# Patient Record
Sex: MALE | Race: WHITE | Employment: FULL TIME | URBAN - METROPOLITAN AREA
[De-identification: names, ages, dates, MRNs, and addresses within clinical notes are randomized per-mention and may not be internally consistent; named-entity substitution may affect disease eponyms.]

---

## 2017-01-05 ENCOUNTER — ANTI-COAG VISIT (OUTPATIENT)
Dept: HEMATOLOGY/ONCOLOGY | Age: 57
End: 2017-01-05
Attending: INTERNAL MEDICINE
Payer: COMMERCIAL

## 2017-01-05 LAB — INR: 2.9 (ref 0.8–1.3)

## 2017-01-05 PROCEDURE — 36416 COLLJ CAPILLARY BLOOD SPEC: CPT

## 2017-01-05 PROCEDURE — 85610 PROTHROMBIN TIME: CPT

## 2017-01-11 ENCOUNTER — TELEPHONE (OUTPATIENT)
Dept: SURGERY | Facility: CLINIC | Age: 57
End: 2017-01-11

## 2017-01-11 ENCOUNTER — OFFICE VISIT (OUTPATIENT)
Dept: SURGERY | Facility: CLINIC | Age: 57
End: 2017-01-11

## 2017-01-11 VITALS
HEIGHT: 70 IN | SYSTOLIC BLOOD PRESSURE: 144 MMHG | HEART RATE: 81 BPM | WEIGHT: 295 LBS | RESPIRATION RATE: 15 BRPM | BODY MASS INDEX: 42.23 KG/M2 | DIASTOLIC BLOOD PRESSURE: 82 MMHG

## 2017-01-11 DIAGNOSIS — M46.1 BILATERAL SACROILIITIS (HCC): Primary | ICD-10-CM

## 2017-01-11 PROCEDURE — 99214 OFFICE O/P EST MOD 30 MIN: CPT | Performed by: ANESTHESIOLOGY

## 2017-01-11 NOTE — TELEPHONE ENCOUNTER
Patient evaluated 1/11, recommended for Right and Left Radiofrequency Ablation of Sacral Lateral Branches. Patient is Work Comp.  Please start prior authorization request.

## 2017-01-11 NOTE — PATIENT INSTRUCTIONS
Refill policies:    • Allow 2 business days for refills; controlled substances may take longer.   • Contact your pharmacy at least 5 days prior to running out of medication and have them send an electronic request or submit request through the “request re your physician has recommended that you have a procedure or additional testing performed. [facility]) will contact your insurance carrier to obtain pre-certification or prior authorization.     Unfortunately, JACK has seen an increas

## 2017-01-11 NOTE — TELEPHONE ENCOUNTER
Called Work comp and left a detailed message with Larry Marcial.     Will need clinical notes completed to fax the notes to the adjustor at fax#[phone]

## 2017-01-15 NOTE — PROGRESS NOTES
Name: Elias Prieto   : 10/16/1960   DOS: 2017    Pain Clinic Follow Up Visit:   Elias Prieto is a 60year old male who presents for recheck of his chronic low back pain.   He is status post radiofrequency ablation of bilateral lumbar medial bran 42.33 kg/m2  Severe tenderness over the bilateral sacroiliac joint. Carmen Gasmen test is positive bilaterally. Flexion of the spine makes the pain better, extension and lateral rotation of the spine makes the pain worse.  Straight leg raising test is negative bi

## 2017-01-16 NOTE — TELEPHONE ENCOUNTER
Clinical notes completed, to be faxed to Camron Neely. Also spoke with patient who states  will be sending letter of payment guarantee incase workman's comp denies. Patient/ did this back in August which was OK'd by .

## 2017-01-19 ENCOUNTER — ANTI-COAG VISIT (OUTPATIENT)
Dept: HEMATOLOGY/ONCOLOGY | Age: 57
End: 2017-01-19
Attending: INTERNAL MEDICINE
Payer: COMMERCIAL

## 2017-01-19 DIAGNOSIS — I26.99 OTHER PULMONARY EMBOLISM WITHOUT ACUTE COR PULMONALE (HCC): Primary | ICD-10-CM

## 2017-01-19 LAB — INR: 3.1 (ref 0.8–1.3)

## 2017-01-19 PROCEDURE — 36416 COLLJ CAPILLARY BLOOD SPEC: CPT

## 2017-01-20 ENCOUNTER — TELEPHONE (OUTPATIENT)
Dept: SURGERY | Facility: CLINIC | Age: 57
End: 2017-01-20

## 2017-01-20 DIAGNOSIS — M46.1 BILATERAL SACROILIITIS (HCC): Primary | ICD-10-CM

## 2017-01-20 DIAGNOSIS — M47.817 LUMBOSACRAL SPONDYLOSIS WITHOUT MYELOPATHY: ICD-10-CM

## 2017-01-20 NOTE — TELEPHONE ENCOUNTER
Letter received from patient's  office, letter indicates  office will be responsible for payment of RFA if Workers Comp does not pay. Letter sent to scanning. Spoke with patient and scheduled RFA x 2 . Reviewed pre-op instructions.  [place] ? 81mg day of procedure  ? Greater than 81 mg (325mg) 7 days  ? Coumadin Procedure may be cancelled if INR is elevated. ? Epidural ____ - 7 days  ? Others 5 days  ? Excedrin (with aspirin) 7 days  ? Plavix (Clopidogrel)  ? Epidural ____ - 10 days  ?  [place]

## 2017-01-23 ENCOUNTER — TELEPHONE (OUTPATIENT)
Dept: NEUROLOGY | Facility: CLINIC | Age: 57
End: 2017-01-23

## 2017-01-23 NOTE — TELEPHONE ENCOUNTER
Spoke to Marielle at [place], codes [id number] are valid and billable, no prior authorization or predetermination required.  Call reference: [id number]  Call time 53:06

## 2017-01-24 ENCOUNTER — DOCUMENTATION ONLY (OUTPATIENT)
Dept: HEMATOLOGY/ONCOLOGY | Facility: HOSPITAL | Age: 57
End: 2017-01-24

## 2017-01-24 NOTE — PROGRESS NOTES
[facility] Progress Note    Patient Name: Savannah Bhatia   YOB: 1960   Medical Record Number: [id number]   CSN: [id number]   Attending Physician: Edi Booth M.D.     1/24/2017     Patient is to undergo radiofrequency ablation by Dr. Jose Alberto Caballero

## 2017-01-24 NOTE — TELEPHONE ENCOUNTER
Brianne from Dr Gale Mcmullen office phone # [phone] called to schedule a Peer to Peer 1/25 at 12:00 for upcoming injections at the request of Philip YANEZ/[employer]

## 2017-01-24 NOTE — TELEPHONE ENCOUNTER
Pt informed of message below regarding medical clearance. Pt verbalized understanding. No further questions at this time.

## 2017-01-25 NOTE — TELEPHONE ENCOUNTER
Per LE Del Angel and Dr. Troy Brasher, peer to peer completed with Dr. Shawn Moore, RFAs of right and left sacral branches approved, but wait for the letter. No authorization number given.     Patient already scheduled 2/14/17 and 2/28/17 due to receipt of guaranteed of pa

## 2017-01-26 ENCOUNTER — TELEPHONE (OUTPATIENT)
Dept: HEMATOLOGY/ONCOLOGY | Facility: HOSPITAL | Age: 57
End: 2017-01-26

## 2017-01-26 NOTE — TELEPHONE ENCOUNTER
Received Peer Review Report from [facility] [employer],endorsed to Pain nurses in [address] [place] [address]

## 2017-01-27 NOTE — TELEPHONE ENCOUNTER
Correspondence received from [facility] stating the Radiofrequency ablation of right sacral lateral branch followed by left sacral lateral branch are medically certified by physician advisor. Letter sent to scan.

## 2017-02-14 ENCOUNTER — SURGERY (OUTPATIENT)
Age: 57
End: 2017-02-14

## 2017-02-14 ENCOUNTER — APPOINTMENT (OUTPATIENT)
Dept: GENERAL RADIOLOGY | Facility: HOSPITAL | Age: 57
End: 2017-02-14
Attending: ANESTHESIOLOGY
Payer: OTHER MISCELLANEOUS

## 2017-02-14 ENCOUNTER — TELEPHONE (OUTPATIENT)
Dept: SURGERY | Facility: CLINIC | Age: 57
End: 2017-02-14

## 2017-02-14 ENCOUNTER — HOSPITAL ENCOUNTER (OUTPATIENT)
Facility: HOSPITAL | Age: 57
Setting detail: HOSPITAL OUTPATIENT SURGERY
Discharge: HOME | End: 2017-02-14
Attending: ANESTHESIOLOGY | Admitting: ANESTHESIOLOGY
Payer: OTHER MISCELLANEOUS

## 2017-02-14 VITALS
OXYGEN SATURATION: 98 % | HEART RATE: 61 BPM | RESPIRATION RATE: 18 BRPM | TEMPERATURE: 98 F | DIASTOLIC BLOOD PRESSURE: 80 MMHG | SYSTOLIC BLOOD PRESSURE: 127 MMHG

## 2017-02-14 DIAGNOSIS — M47.817 LUMBOSACRAL SPONDYLOSIS WITHOUT MYELOPATHY: ICD-10-CM

## 2017-02-14 DIAGNOSIS — M46.1 BILATERAL SACROILIITIS (HCC): ICD-10-CM

## 2017-02-14 DIAGNOSIS — M46.1 SACROILIITIS (HCC): Primary | ICD-10-CM

## 2017-02-14 PROCEDURE — 99152 MOD SED SAME PHYS/QHP 5/>YRS: CPT | Performed by: ANESTHESIOLOGY

## 2017-02-14 PROCEDURE — 3E0T3TZ INTRODUCTION OF DESTRUCTIVE AGENT INTO PERIPHERAL NERVES AND PLEXI, PERCUTANEOUS APPROACH: ICD-10-PCS | Performed by: ANESTHESIOLOGY

## 2017-02-14 RX ORDER — METHYLPREDNISOLONE ACETATE 40 MG/ML
INJECTION, SUSPENSION INTRA-ARTICULAR; INTRALESIONAL; INTRAMUSCULAR; SOFT TISSUE AS NEEDED
Status: DISCONTINUED | OUTPATIENT
Start: 2017-02-14 | End: 2017-02-14 | Stop reason: HOSPADM

## 2017-02-14 RX ORDER — SODIUM CHLORIDE, SODIUM LACTATE, POTASSIUM CHLORIDE, CALCIUM CHLORIDE 600; 310; 30; 20 MG/100ML; MG/100ML; MG/100ML; MG/100ML
100 INJECTION, SOLUTION INTRAVENOUS CONTINUOUS
Status: DISCONTINUED | OUTPATIENT
Start: 2017-02-14 | End: 2017-02-14

## 2017-02-14 RX ORDER — LIDOCAINE HYDROCHLORIDE 10 MG/ML
INJECTION, SOLUTION EPIDURAL; INFILTRATION; INTRACAUDAL; PERINEURAL AS NEEDED
Status: DISCONTINUED | OUTPATIENT
Start: 2017-02-14 | End: 2017-02-14 | Stop reason: HOSPADM

## 2017-02-14 NOTE — H&P
History & Physical Examination    Patient Name: Paulo Mckeon  MRN: [id number]  CSN: [id number]  YOB: 1960    Pre-Operative Diagnosis:  Lumbosacral spondylosis without myelopathy [M47.817]  Bilateral sacroiliitis ([address] [M46.1]    Present Illn right worse than left. HEART [x ] [x ]    LUNGS [x ] [x ]    ABDOMEN [x ] [x ]    UROGENITAL [x ] [x ]    EXTREMITIES [x ] [x ]    OTHER        [ x ] I have discussed the risks and benefits and alternatives with the patient/family.   They understand and

## 2017-02-14 NOTE — OPERATIVE REPORT
[facility]  Operative Report  2017     Jostin Kelley Patient Status:  Hospital Outpatient Surgery    10/16/1960 MRN [id number]   Location [address] [place] [address] Attending No att. providers found   Hosp Day # 0 PCP May Lipscomb the inferior pole of the right sacroiliac joint. The second needle was placed into the joint but approximately 1 cm cephalad to the first needle.  The subsequent needles were place in this \"leap frog\" fashion until the superior pole of the joint is reache

## 2017-02-15 NOTE — TELEPHONE ENCOUNTER
Please change the procedure on 2/28 to RFA left sacral lateral branches. Case request entered with sedation.   Thanks

## 2017-02-16 ENCOUNTER — APPOINTMENT (OUTPATIENT)
Dept: HEMATOLOGY/ONCOLOGY | Age: 57
End: 2017-02-16
Attending: INTERNAL MEDICINE
Payer: COMMERCIAL

## 2017-02-26 ENCOUNTER — TELEPHONE (OUTPATIENT)
Dept: NEUROLOGY | Facility: CLINIC | Age: 57
End: 2017-02-26

## 2017-02-26 NOTE — TELEPHONE ENCOUNTER
Pt informed pain procedure on 2/28 canceled at this time. Instructed to call insurance carrier to determine other pain management physicians approved in pts plan. Pt stated this is workman's comp.   I reiterated that he should seek treatment elsewhere so h

## 2017-02-27 DIAGNOSIS — K21.00 REFLUX ESOPHAGITIS: Primary | ICD-10-CM

## 2017-02-27 RX ORDER — PANTOPRAZOLE SODIUM 40 MG/1
40 TABLET, DELAYED RELEASE ORAL DAILY
Qty: 90 TABLET | Refills: 0 | Status: SHIPPED | OUTPATIENT
Start: 2017-02-27 | End: 2017-05-30

## 2017-03-06 ENCOUNTER — TELEPHONE (OUTPATIENT)
Dept: SURGERY | Facility: CLINIC | Age: 57
End: 2017-03-06

## 2017-03-06 ENCOUNTER — ANTI-COAG VISIT (OUTPATIENT)
Dept: HEMATOLOGY/ONCOLOGY | Age: 57
End: 2017-03-06
Attending: INTERNAL MEDICINE
Payer: COMMERCIAL

## 2017-03-06 DIAGNOSIS — M46.1 SACROILIITIS, NOT ELSEWHERE CLASSIFIED (HCC): Primary | ICD-10-CM

## 2017-03-06 DIAGNOSIS — I26.99 OTHER PULMONARY EMBOLISM WITHOUT ACUTE COR PULMONALE (HCC): Primary | ICD-10-CM

## 2017-03-06 DIAGNOSIS — M47.819 SPONDYLOSIS WITHOUT MYELOPATHY: ICD-10-CM

## 2017-03-06 LAB — INR: 1.8 (ref 0.8–1.3)

## 2017-03-06 PROCEDURE — 85610 PROTHROMBIN TIME: CPT

## 2017-03-06 PROCEDURE — 36416 COLLJ CAPILLARY BLOOD SPEC: CPT

## 2017-03-06 NOTE — TELEPHONE ENCOUNTER
Spoke to patient, 2/28 cancelled procedure has been rescheduled. Pre-procedure instructions reviewed, patient verbalized understanding, no further needs at this time.       [address]  PRE-PROCEDURE INSTRUCTIONS WITH IV SEDATION    Appointm ? Others 7 days   NSAIDs: 24 hours    ?  Ibuprofen (Motrin, Advil, Vicoprofen), Naproxen (Naprosyn, Aleve), Piroxcam (Feldene), Meloxicam (Mobic), Oxaprozin (Daypro), Diclofenac (Voltaren),  Indomethacin (Indocin), Etodolac (Lodine), Nabumetone (Relafen)

## 2017-03-13 ENCOUNTER — ANTI-COAG VISIT (OUTPATIENT)
Dept: HEMATOLOGY/ONCOLOGY | Age: 57
End: 2017-03-13
Attending: INTERNAL MEDICINE
Payer: COMMERCIAL

## 2017-03-13 DIAGNOSIS — I26.99 OTHER PULMONARY EMBOLISM WITHOUT ACUTE COR PULMONALE (HCC): Primary | ICD-10-CM

## 2017-03-13 LAB — INR: 3.2 (ref 0.8–1.3)

## 2017-03-13 PROCEDURE — 85610 PROTHROMBIN TIME: CPT

## 2017-03-13 PROCEDURE — 36416 COLLJ CAPILLARY BLOOD SPEC: CPT

## 2017-03-27 ENCOUNTER — APPOINTMENT (OUTPATIENT)
Dept: GENERAL RADIOLOGY | Facility: HOSPITAL | Age: 57
End: 2017-03-27
Attending: ANESTHESIOLOGY
Payer: OTHER MISCELLANEOUS

## 2017-03-27 ENCOUNTER — SURGERY (OUTPATIENT)
Age: 57
End: 2017-03-27

## 2017-03-27 ENCOUNTER — HOSPITAL ENCOUNTER (OUTPATIENT)
Facility: HOSPITAL | Age: 57
Setting detail: HOSPITAL OUTPATIENT SURGERY
Discharge: HOME | End: 2017-03-27
Attending: ANESTHESIOLOGY | Admitting: ANESTHESIOLOGY
Payer: OTHER MISCELLANEOUS

## 2017-03-27 VITALS
SYSTOLIC BLOOD PRESSURE: 142 MMHG | TEMPERATURE: 98 F | OXYGEN SATURATION: 100 % | DIASTOLIC BLOOD PRESSURE: 93 MMHG | RESPIRATION RATE: 22 BRPM | HEART RATE: 59 BPM

## 2017-03-27 DIAGNOSIS — M46.1 SACROILIITIS, NOT ELSEWHERE CLASSIFIED (HCC): ICD-10-CM

## 2017-03-27 DIAGNOSIS — M47.819 SPONDYLOSIS WITHOUT MYELOPATHY: ICD-10-CM

## 2017-03-27 PROCEDURE — 99152 MOD SED SAME PHYS/QHP 5/>YRS: CPT | Performed by: ANESTHESIOLOGY

## 2017-03-27 PROCEDURE — 3E0T3TZ INTRODUCTION OF DESTRUCTIVE AGENT INTO PERIPHERAL NERVES AND PLEXI, PERCUTANEOUS APPROACH: ICD-10-PCS | Performed by: ANESTHESIOLOGY

## 2017-03-27 RX ORDER — SODIUM CHLORIDE, SODIUM LACTATE, POTASSIUM CHLORIDE, CALCIUM CHLORIDE 600; 310; 30; 20 MG/100ML; MG/100ML; MG/100ML; MG/100ML
100 INJECTION, SOLUTION INTRAVENOUS CONTINUOUS
Status: DISCONTINUED | OUTPATIENT
Start: 2017-03-27 | End: 2017-03-27

## 2017-03-27 RX ORDER — METHYLPREDNISOLONE ACETATE 40 MG/ML
INJECTION, SUSPENSION INTRA-ARTICULAR; INTRALESIONAL; INTRAMUSCULAR; SOFT TISSUE AS NEEDED
Status: DISCONTINUED | OUTPATIENT
Start: 2017-03-27 | End: 2017-03-27 | Stop reason: HOSPADM

## 2017-03-27 RX ORDER — LIDOCAINE HYDROCHLORIDE 10 MG/ML
INJECTION, SOLUTION EPIDURAL; INFILTRATION; INTRACAUDAL; PERINEURAL AS NEEDED
Status: DISCONTINUED | OUTPATIENT
Start: 2017-03-27 | End: 2017-03-27 | Stop reason: HOSPADM

## 2017-03-27 NOTE — OPERATIVE REPORT
[facility]  Operative Report  3/27/2017     Meseret Abarca Patient Status:  Hospital Outpatient Surgery    10/16/1960 MRN [id number]   [facility] Attending Bernardino Mg MD   Hosp Day # 0 PCP Gene Santos MD     Indication: the left sacroiliac joint. The second needle was placed into the joint but approximately 1 cm cephalad to the first needle. The subsequent needles were place in this \"leap frog\" fashion until the superior pole of the joint is reached.  Under fluoroscopic

## 2017-03-27 NOTE — H&P
History & Physical Examination    Patient Name: Baron Miner  MRN: [id number]  CSN: [id number]  YOB: 1960    Pre-Operative Diagnosis:  Sacroiliitis, not elsewhere classified [facility] [M46.1]  Spondylosis without myelopathy [M19.90]    Present HEART [x ] [x ]    LUNGS [x ] [x ]    ABDOMEN [x ] [x ]    UROGENITAL [x ] [x ]    EXTREMITIES [x ] [x ]    OTHER        [ x ] I have discussed the risks and benefits and alternatives with the patient/family.   They understand and agree to proceed with pl

## 2017-04-05 ENCOUNTER — ANTI-COAG VISIT (OUTPATIENT)
Dept: HEMATOLOGY/ONCOLOGY | Age: 57
End: 2017-04-05
Attending: INTERNAL MEDICINE
Payer: COMMERCIAL

## 2017-04-05 DIAGNOSIS — I26.99 OTHER PULMONARY EMBOLISM WITHOUT ACUTE COR PULMONALE (HCC): Primary | ICD-10-CM

## 2017-04-05 PROCEDURE — 85610 PROTHROMBIN TIME: CPT

## 2017-04-05 PROCEDURE — 36416 COLLJ CAPILLARY BLOOD SPEC: CPT

## 2017-04-06 ENCOUNTER — SOCIAL WORK SERVICES (OUTPATIENT)
Dept: HEMATOLOGY/ONCOLOGY | Facility: HOSPITAL | Age: 57
End: 2017-04-06

## 2017-04-06 NOTE — PROGRESS NOTES
SW met with patient and explained process for referral for home monitoring for coumadin. [facility] faxed referral to [facility] at [phone].

## 2017-04-07 ENCOUNTER — TELEPHONE (OUTPATIENT)
Dept: HEMATOLOGY/ONCOLOGY | Facility: HOSPITAL | Age: 57
End: 2017-04-07

## 2017-04-07 DIAGNOSIS — I26.99 OTHER PULMONARY EMBOLISM WITHOUT ACUTE COR PULMONALE (HCC): Primary | ICD-10-CM

## 2017-04-07 RX ORDER — WARFARIN SODIUM 5 MG/1
TABLET ORAL
Qty: 60 TABLET | Refills: 3 | Status: SHIPPED
Start: 2017-04-07 | End: 2017-12-18

## 2017-04-11 ENCOUNTER — ANTI-COAG VISIT (OUTPATIENT)
Dept: HEMATOLOGY/ONCOLOGY | Age: 57
End: 2017-04-11
Attending: INTERNAL MEDICINE
Payer: COMMERCIAL

## 2017-04-11 DIAGNOSIS — I26.99 OTHER PULMONARY EMBOLISM WITHOUT ACUTE COR PULMONALE (HCC): Primary | ICD-10-CM

## 2017-04-11 PROCEDURE — 36416 COLLJ CAPILLARY BLOOD SPEC: CPT

## 2017-04-11 PROCEDURE — 85610 PROTHROMBIN TIME: CPT

## 2017-04-17 DIAGNOSIS — E78.5 HYPERLIPIDEMIA, UNSPECIFIED HYPERLIPIDEMIA TYPE: ICD-10-CM

## 2017-04-17 RX ORDER — OMEGA-3-ACID ETHYL ESTERS 1 G/1
2 CAPSULE, LIQUID FILLED ORAL 2 TIMES DAILY
Qty: 360 CAPSULE | Refills: 0 | Status: SHIPPED | OUTPATIENT
Start: 2017-04-17 | End: 2017-07-21

## 2017-04-17 NOTE — TELEPHONE ENCOUNTER
From: Emily Solis  To:  Stephen Daly MD  Sent: 4/17/2017 9:08 AM CDT  Subject: Medication Renewal Request    Original authorizing provider: MD Emily Walsh would like a refill of the following medications:  Omega-3-acid Ethyl Esters (L

## 2017-04-18 ENCOUNTER — ANTI-COAG VISIT (OUTPATIENT)
Dept: HEMATOLOGY/ONCOLOGY | Age: 57
End: 2017-04-18
Attending: INTERNAL MEDICINE
Payer: COMMERCIAL

## 2017-04-18 ENCOUNTER — OFFICE VISIT (OUTPATIENT)
Dept: SURGERY | Facility: CLINIC | Age: 57
End: 2017-04-18

## 2017-04-18 VITALS
WEIGHT: 300 LBS | RESPIRATION RATE: 18 BRPM | HEIGHT: 70 IN | DIASTOLIC BLOOD PRESSURE: 76 MMHG | HEART RATE: 65 BPM | SYSTOLIC BLOOD PRESSURE: 134 MMHG | OXYGEN SATURATION: 98 % | BODY MASS INDEX: 42.95 KG/M2

## 2017-04-18 DIAGNOSIS — I26.99 OTHER PULMONARY EMBOLISM WITHOUT ACUTE COR PULMONALE (HCC): Primary | ICD-10-CM

## 2017-04-18 DIAGNOSIS — M46.1 SACROILIITIS (HCC): Primary | ICD-10-CM

## 2017-04-18 DIAGNOSIS — M51.36 DDD (DEGENERATIVE DISC DISEASE), LUMBAR: ICD-10-CM

## 2017-04-18 DIAGNOSIS — M47.816 LUMBAR FACET ARTHROPATHY: ICD-10-CM

## 2017-04-18 PROCEDURE — 99214 OFFICE O/P EST MOD 30 MIN: CPT | Performed by: PHYSICIAN ASSISTANT

## 2017-04-18 NOTE — PATIENT INSTRUCTIONS
For Dr. Hull Guest nurse line, call  [phone] with any questions or concerns Monday through Friday 8:00 to 4:30. After hours or weekends for emergent needs [phone]  Take 5mg coumadin on Tuesday, and 2.5mg (split the 5mg tablet) on Friday.   Return

## 2017-04-18 NOTE — PROGRESS NOTES
Name: Estle Crigler   : 10/16/1960   DOS: 2017     Pain Clinic Follow Up Visit:   Patient presents with: Other: f/u,      Estle Crigler is a 60year old male who presents for recheck of chronic low back pain.  Pt is S/P a 2017 [facility] a away.  Currently he does not feel the other areas of pain in the back need to be addressed at this time. Pt denies any chills, fever, or weakness. There is no bladder or bowel incontinence associated with the pain.     REVIEW OF SYSTEMS:  A ten point rev f/u with us on an as needed basis  Pt advised to work on weight loss and to stick with a program. I offered him to see Dr Lena Guan with weight loss clinic but pt declined and said him and his wife never stick to a program and fall off the wagon.   Pt to mateo

## 2017-04-18 NOTE — PATIENT INSTRUCTIONS
Refill policies:    • Allow 2 business days for refills; controlled substances may take longer.   • Contact your pharmacy at least 5 days prior to running out of medication and have them send an electronic request or submit request through the “request re insurance carrier to obtain pre-certification or prior authorization. Unfortunately, JACK has seen an increase in denial of payment even though the procedure/test has been pre-certified.   You are strongly encouraged to contact your insurance carrier to v

## 2017-04-19 ENCOUNTER — TELEPHONE (OUTPATIENT)
Dept: HEMATOLOGY/ONCOLOGY | Facility: HOSPITAL | Age: 57
End: 2017-04-19

## 2017-04-25 ENCOUNTER — ANTI-COAG VISIT (OUTPATIENT)
Dept: HEMATOLOGY/ONCOLOGY | Age: 57
End: 2017-04-25
Attending: INTERNAL MEDICINE
Payer: COMMERCIAL

## 2017-04-25 DIAGNOSIS — I26.99 OTHER PULMONARY EMBOLISM WITHOUT ACUTE COR PULMONALE (HCC): Primary | ICD-10-CM

## 2017-04-25 PROCEDURE — 85610 PROTHROMBIN TIME: CPT

## 2017-04-25 PROCEDURE — 36416 COLLJ CAPILLARY BLOOD SPEC: CPT

## 2017-05-02 ENCOUNTER — ANTI-COAG VISIT (OUTPATIENT)
Dept: HEMATOLOGY/ONCOLOGY | Age: 57
End: 2017-05-02
Attending: INTERNAL MEDICINE
Payer: COMMERCIAL

## 2017-05-02 DIAGNOSIS — I26.99 OTHER PULMONARY EMBOLISM WITHOUT ACUTE COR PULMONALE (HCC): Primary | ICD-10-CM

## 2017-05-02 PROCEDURE — 36416 COLLJ CAPILLARY BLOOD SPEC: CPT

## 2017-05-02 PROCEDURE — 85610 PROTHROMBIN TIME: CPT

## 2017-05-16 ENCOUNTER — ANTI-COAG VISIT (OUTPATIENT)
Dept: HEMATOLOGY/ONCOLOGY | Age: 57
End: 2017-05-16
Attending: INTERNAL MEDICINE
Payer: COMMERCIAL

## 2017-05-16 DIAGNOSIS — I26.99 OTHER PULMONARY EMBOLISM WITHOUT ACUTE COR PULMONALE (HCC): Primary | ICD-10-CM

## 2017-05-16 PROCEDURE — 36416 COLLJ CAPILLARY BLOOD SPEC: CPT

## 2017-05-16 PROCEDURE — 85610 PROTHROMBIN TIME: CPT

## 2017-05-19 ENCOUNTER — ANTI-COAG VISIT (OUTPATIENT)
Dept: HEMATOLOGY/ONCOLOGY | Facility: HOSPITAL | Age: 57
End: 2017-05-19

## 2017-05-19 DIAGNOSIS — I26.99 OTHER PULMONARY EMBOLISM WITHOUT ACUTE COR PULMONALE (HCC): Primary | ICD-10-CM

## 2017-05-26 ENCOUNTER — TELEPHONE (OUTPATIENT)
Dept: HEMATOLOGY/ONCOLOGY | Facility: HOSPITAL | Age: 57
End: 2017-05-26

## 2017-05-26 NOTE — TELEPHONE ENCOUNTER
Pt is set up with home monitoring through [facility]. He is due for check next week. He was told he can report directly to MD and not to [facility] for dosing- is this correct and how does he make sure this is OK? ?   Also, pt was not aware he would have to test every

## 2017-05-29 DIAGNOSIS — K21.00 REFLUX ESOPHAGITIS: ICD-10-CM

## 2017-05-30 RX ORDER — PANTOPRAZOLE SODIUM 40 MG/1
40 TABLET, DELAYED RELEASE ORAL DAILY
Qty: 90 TABLET | Refills: 0 | Status: SHIPPED | OUTPATIENT
Start: 2017-05-30 | End: 2017-07-21

## 2017-05-30 NOTE — TELEPHONE ENCOUNTER
From: Zo Clements  To:  Daria Vallecillo MD  Sent: 5/29/2017 9:19 PM CDT  Subject: Medication Renewal Request    Original authorizing provider: MD Zo Cunha would like a refill of the following medications:  Pantoprazole Sodium 40 MG Or

## 2017-06-02 ENCOUNTER — ANTI-COAG VISIT (OUTPATIENT)
Dept: HEMATOLOGY/ONCOLOGY | Facility: HOSPITAL | Age: 57
End: 2017-06-02

## 2017-06-02 DIAGNOSIS — I26.99 OTHER PULMONARY EMBOLISM WITHOUT ACUTE COR PULMONALE (HCC): Primary | ICD-10-CM

## 2017-06-06 ENCOUNTER — SOCIAL WORK SERVICES (OUTPATIENT)
Dept: HEMATOLOGY/ONCOLOGY | Facility: HOSPITAL | Age: 57
End: 2017-06-06

## 2017-06-06 NOTE — PROGRESS NOTES
Patient wants to call INR results in to MD and test lest frequently than every two weeks. SW spoke with Praful Pappas at [facility]. Insurance requires patient to call results in to [facility] and patient must test every two weeks.   SW left vm for patient regarding abov

## 2017-06-16 ENCOUNTER — ANTI-COAG VISIT (OUTPATIENT)
Dept: HEMATOLOGY/ONCOLOGY | Facility: HOSPITAL | Age: 57
End: 2017-06-16

## 2017-06-16 DIAGNOSIS — I26.99 OTHER PULMONARY EMBOLISM WITHOUT ACUTE COR PULMONALE (HCC): Primary | ICD-10-CM

## 2017-06-23 ENCOUNTER — ANTI-COAG VISIT (OUTPATIENT)
Dept: HEMATOLOGY/ONCOLOGY | Facility: HOSPITAL | Age: 57
End: 2017-06-23

## 2017-06-23 DIAGNOSIS — I26.99 OTHER PULMONARY EMBOLISM WITHOUT ACUTE COR PULMONALE (HCC): Primary | ICD-10-CM

## 2017-06-30 ENCOUNTER — ANTI-COAG VISIT (OUTPATIENT)
Dept: HEMATOLOGY/ONCOLOGY | Facility: HOSPITAL | Age: 57
End: 2017-06-30

## 2017-06-30 DIAGNOSIS — I26.99 OTHER PULMONARY EMBOLISM WITHOUT ACUTE COR PULMONALE (HCC): ICD-10-CM

## 2017-07-07 ENCOUNTER — ANTI-COAG VISIT (OUTPATIENT)
Dept: HEMATOLOGY/ONCOLOGY | Facility: HOSPITAL | Age: 57
End: 2017-07-07

## 2017-07-07 DIAGNOSIS — I26.99 OTHER PULMONARY EMBOLISM WITHOUT ACUTE COR PULMONALE (HCC): ICD-10-CM

## 2017-07-07 LAB — INR: 3 (ref 0.8–1.2)

## 2017-07-21 ENCOUNTER — OFFICE VISIT (OUTPATIENT)
Dept: FAMILY MEDICINE CLINIC | Facility: CLINIC | Age: 57
End: 2017-07-21

## 2017-07-21 ENCOUNTER — ANTI-COAG VISIT (OUTPATIENT)
Dept: HEMATOLOGY/ONCOLOGY | Facility: HOSPITAL | Age: 57
End: 2017-07-21

## 2017-07-21 VITALS
DIASTOLIC BLOOD PRESSURE: 82 MMHG | OXYGEN SATURATION: 98 % | BODY MASS INDEX: 43.09 KG/M2 | WEIGHT: 301 LBS | HEIGHT: 70 IN | RESPIRATION RATE: 20 BRPM | HEART RATE: 68 BPM | TEMPERATURE: 98 F | SYSTOLIC BLOOD PRESSURE: 128 MMHG

## 2017-07-21 DIAGNOSIS — E78.5 HYPERLIPIDEMIA, UNSPECIFIED HYPERLIPIDEMIA TYPE: ICD-10-CM

## 2017-07-21 DIAGNOSIS — K21.00 REFLUX ESOPHAGITIS: ICD-10-CM

## 2017-07-21 DIAGNOSIS — I82.443 DEEP VEIN THROMBOSIS (DVT) OF TIBIAL VEIN OF BOTH LOWER EXTREMITIES, UNSPECIFIED CHRONICITY (HCC): Primary | ICD-10-CM

## 2017-07-21 DIAGNOSIS — I26.99 OTHER PULMONARY EMBOLISM WITHOUT ACUTE COR PULMONALE (HCC): ICD-10-CM

## 2017-07-21 DIAGNOSIS — E55.9 VITAMIN D DEFICIENCY: ICD-10-CM

## 2017-07-21 DIAGNOSIS — M54.16 LUMBAR RADICULITIS: ICD-10-CM

## 2017-07-21 LAB — INR: 2.5 (ref 0.8–1.2)

## 2017-07-21 PROCEDURE — 99214 OFFICE O/P EST MOD 30 MIN: CPT | Performed by: FAMILY MEDICINE

## 2017-07-21 RX ORDER — CHOLECALCIFEROL (VITAMIN D3) 125 MCG
CAPSULE ORAL
COMMUNITY

## 2017-07-21 RX ORDER — TRAMADOL HYDROCHLORIDE 50 MG/1
50 TABLET ORAL EVERY 8 HOURS PRN
Qty: 90 TABLET | Refills: 1 | Status: SHIPPED
Start: 2017-07-21 | End: 2017-07-24

## 2017-07-21 RX ORDER — OMEGA-3-ACID ETHYL ESTERS 1 G/1
2 CAPSULE, LIQUID FILLED ORAL 2 TIMES DAILY
Qty: 360 CAPSULE | Refills: 1 | Status: SHIPPED | OUTPATIENT
Start: 2017-07-21 | End: 2017-07-24

## 2017-07-21 RX ORDER — PANTOPRAZOLE SODIUM 40 MG/1
40 TABLET, DELAYED RELEASE ORAL DAILY
Qty: 90 TABLET | Refills: 1 | Status: SHIPPED | OUTPATIENT
Start: 2017-07-21 | End: 2017-07-24

## 2017-07-21 NOTE — PROGRESS NOTES
Chief Complaint:   Patient presents with:  Medication Follow-Up    HPI:   This is a 60year old male presenting for physical.     Patient has history of TG-currently on lovaza with no side effects, last TG's stable.      Patient has a history of DVT and pul abdominal pain  Current Meds:    Current Outpatient Prescriptions:  Acetaminophen (ARTHRITIS PAIN OR) Take by mouth. Disp:  Rfl:    Cholecalciferol (VITAMIN D3) 2000 units Oral Tab Take by mouth.  Disp:  Rfl:    Pantoprazole Sodium 40 MG Oral Tab EC Take 1 Psychiatric/Behavioral: Negative for suicidal ideas, behavioral problems, sleep disturbance and agitation. The patient is not nervous/anxious.         EXAM:   /82 (BP Location: Right arm, Patient Position: Sitting, Cuff Size: adult)   Pulse 68   Tem normal mood and affect. His behavior is normal. Judgment and thought content normal.        ASSESSMENT AND PLAN:     1. Hyperlipidemia, unspecified hyperlipidemia type  -stable, CPM  - Omega-3-acid Ethyl Esters (LOVAZA) 1 g Oral Cap;  Take 2 capsules (2 g t

## 2017-07-24 ENCOUNTER — TELEPHONE (OUTPATIENT)
Dept: FAMILY MEDICINE CLINIC | Facility: CLINIC | Age: 57
End: 2017-07-24

## 2017-07-24 DIAGNOSIS — K21.00 REFLUX ESOPHAGITIS: ICD-10-CM

## 2017-07-24 DIAGNOSIS — M54.16 LUMBAR RADICULITIS: ICD-10-CM

## 2017-07-24 DIAGNOSIS — E78.5 HYPERLIPIDEMIA, UNSPECIFIED HYPERLIPIDEMIA TYPE: ICD-10-CM

## 2017-07-24 RX ORDER — TRAMADOL HYDROCHLORIDE 50 MG/1
50 TABLET ORAL EVERY 8 HOURS PRN
Qty: 90 TABLET | Refills: 1 | Status: SHIPPED
Start: 2017-07-24 | End: 2017-07-27

## 2017-07-24 RX ORDER — CLOTRIMAZOLE AND BETAMETHASONE DIPROPIONATE 10; .64 MG/G; MG/G
1 CREAM TOPICAL 2 TIMES DAILY
Qty: 45 G | Refills: 1 | Status: SHIPPED | OUTPATIENT
Start: 2017-07-24 | End: 2017-08-07

## 2017-07-24 RX ORDER — PANTOPRAZOLE SODIUM 40 MG/1
40 TABLET, DELAYED RELEASE ORAL DAILY
Qty: 90 TABLET | Refills: 1 | Status: SHIPPED | OUTPATIENT
Start: 2017-07-24 | End: 2018-02-26

## 2017-07-24 RX ORDER — OMEGA-3-ACID ETHYL ESTERS 1 G/1
2 CAPSULE, LIQUID FILLED ORAL 2 TIMES DAILY
Qty: 360 CAPSULE | Refills: 1 | Status: SHIPPED | OUTPATIENT
Start: 2017-07-24 | End: 2018-01-26

## 2017-07-24 NOTE — TELEPHONE ENCOUNTER
[address] requesting refill on tramadol 50 MG states pt is new to pharmacy and needs new script sent to them please fax [phone]

## 2017-07-24 NOTE — TELEPHONE ENCOUNTER
Patient was seen by the doctor on 7/21/17. He had 3 scripts filled and sent to his mail order pharmacy.  Pt states during the visit he also spoke to Dr. Stephanie Cardona, regarding a skin irritation he had been having and Dr. Stephanie Cardona told him he would send a topical

## 2017-07-24 NOTE — TELEPHONE ENCOUNTER
Script for Tramadol was filled on 7.21.17 through [facility]. [facility] order. [facility] mail order called today for the script. Need to verify which pharmacy the pt would like to use. LM for pt to call the office back.

## 2017-07-24 NOTE — TELEPHONE ENCOUNTER
Please approve pending rx for three above scripts. These scripts needs to be resent/ faxed to [facility]. Thank you.

## 2017-07-24 NOTE — TELEPHONE ENCOUNTER
Called pt, he stated his insurance shows he is to use [place] [facility] however he received a letter in the mail stating [employer] will send the scripts to [facility]. Pt stated the script does need to go to [facility] mail service.

## 2017-07-27 DIAGNOSIS — M54.16 LUMBAR RADICULITIS: ICD-10-CM

## 2017-07-27 RX ORDER — TRAMADOL HYDROCHLORIDE 50 MG/1
50 TABLET ORAL EVERY 8 HOURS PRN
Qty: 270 TABLET | Refills: 1 | Status: SHIPPED
Start: 2017-07-27 | End: 2017-07-28

## 2017-07-27 NOTE — TELEPHONE ENCOUNTER
Mail order pharmacy called us regarding Tramadol Rx sent on 07/24, they can only fill Rx for 60 or 90 days, anything 30 days must be filled at local pharmacy.  If OK to increase, please fax to mail order, but if only authorized for as currently written, ple

## 2017-07-27 NOTE — TELEPHONE ENCOUNTER
Please  read message below. Ok to fill 90 day supply? (pended Rx is for 270 tablets). Please advise.  Thank you

## 2017-07-28 RX ORDER — TRAMADOL HYDROCHLORIDE 50 MG/1
50 TABLET ORAL EVERY 8 HOURS PRN
Qty: 60 TABLET | Refills: 1 | Status: SHIPPED
Start: 2017-07-28 | End: 2018-08-24

## 2017-07-28 NOTE — TELEPHONE ENCOUNTER
Rx initially approved by Dr. Mike Sierra, however he is out of the office today and unable to sign Rx. Additionally, per message below, 270 tabs is too much and script should not go to mail order.      Please sign off on above 30 day Rx if appropriate for patie

## 2017-08-04 ENCOUNTER — ANTI-COAG VISIT (OUTPATIENT)
Dept: HEMATOLOGY/ONCOLOGY | Facility: HOSPITAL | Age: 57
End: 2017-08-04

## 2017-08-04 DIAGNOSIS — I26.99 OTHER PULMONARY EMBOLISM WITHOUT ACUTE COR PULMONALE (HCC): ICD-10-CM

## 2017-08-04 LAB — INR: 2 (ref 0.8–1.2)

## 2017-08-16 LAB
ABSOLUTE BASOPHILS: 67 CELLS/UL (ref 0–200)
ABSOLUTE EOSINOPHILS: 221 CELLS/UL (ref 15–500)
ABSOLUTE LYMPHOCYTES: 1968 CELLS/UL (ref 850–3900)
ABSOLUTE MONOCYTES: 427 CELLS/UL (ref 200–950)
ABSOLUTE NEUTROPHILS: 2117 CELLS/UL (ref 1500–7800)
ALBUMIN/GLOBULIN RATIO: 1.5 (CALC) (ref 1–2.5)
ALBUMIN: 4.3 G/DL (ref 3.6–5.1)
ALKALINE PHOSPHATASE: 43 U/L (ref 40–115)
ALT: 39 U/L (ref 9–46)
AST: 37 U/L (ref 10–35)
BASOPHILS: 1.4 %
BILIRUBIN, TOTAL: 0.6 MG/DL (ref 0.2–1.2)
BUN: 17 MG/DL (ref 7–25)
CALCIUM: 9.8 MG/DL (ref 8.6–10.3)
CARBON DIOXIDE: 26 MMOL/L (ref 20–31)
CHLORIDE: 104 MMOL/L (ref 98–110)
CHOL/HDLC RATIO: 6 (CALC)
CHOLESTEROL, TOTAL: 288 MG/DL (ref 125–200)
CREATININE: 1.22 MG/DL (ref 0.7–1.33)
EGFR IF AFRICN AM: 75 ML/MIN/1.73M2
EGFR IF NONAFRICN AM: 65 ML/MIN/1.73M2
EOSINOPHILS: 4.6 %
GLOBULIN: 2.9 G/DL (CALC) (ref 1.9–3.7)
GLUCOSE: 101 MG/DL (ref 65–99)
HDL CHOLESTEROL: 48 MG/DL
HEMATOCRIT: 44.5 % (ref 38.5–50)
HEMOGLOBIN: 15.7 G/DL (ref 13.2–17.1)
LDL-CHOLESTEROL: 171 MG/DL (CALC)
LYMPHOCYTES: 41 %
MCH: 32.8 PG (ref 27–33)
MCHC: 35.3 G/DL (ref 32–36)
MCV: 92.9 FL (ref 80–100)
MONOCYTES: 8.9 %
MPV: 11.3 FL (ref 7.5–12.5)
NEUTROPHILS: 44.1 %
NON-HDL CHOLESTEROL: 240 MG/DL (CALC)
PLATELET COUNT: 210 THOUSAND/UL (ref 140–400)
POTASSIUM: 4.8 MMOL/L (ref 3.5–5.3)
PROTEIN, TOTAL: 7.2 G/DL (ref 6.1–8.1)
RDW: 13.3 % (ref 11–15)
RED BLOOD CELL COUNT: 4.79 MILLION/UL (ref 4.2–5.8)
SODIUM: 141 MMOL/L (ref 135–146)
TRIGLYCERIDES: 347 MG/DL
VITAMIN D, 25-OH, TOTAL: 26 NG/ML (ref 30–100)
WHITE BLOOD CELL COUNT: 4.8 THOUSAND/UL (ref 3.8–10.8)

## 2017-08-18 ENCOUNTER — ANTI-COAG VISIT (OUTPATIENT)
Dept: HEMATOLOGY/ONCOLOGY | Facility: HOSPITAL | Age: 57
End: 2017-08-18

## 2017-08-18 DIAGNOSIS — I26.99 OTHER PULMONARY EMBOLISM WITHOUT ACUTE COR PULMONALE (HCC): ICD-10-CM

## 2017-08-18 LAB — INR: 1.7 (ref 0.8–1.2)

## 2017-08-22 ENCOUNTER — TELEPHONE (OUTPATIENT)
Dept: FAMILY MEDICINE CLINIC | Facility: CLINIC | Age: 57
End: 2017-08-22

## 2017-08-22 ENCOUNTER — PATIENT MESSAGE (OUTPATIENT)
Dept: FAMILY MEDICINE CLINIC | Facility: CLINIC | Age: 57
End: 2017-08-22

## 2017-08-22 DIAGNOSIS — E55.9 VITAMIN D DEFICIENCY: ICD-10-CM

## 2017-08-22 DIAGNOSIS — E78.00 HYPERCHOLESTEREMIA: Primary | ICD-10-CM

## 2017-08-22 RX ORDER — GEMFIBROZIL 600 MG/1
600 TABLET, FILM COATED ORAL
Qty: 60 TABLET | Refills: 2 | Status: SHIPPED | OUTPATIENT
Start: 2017-08-22 | End: 2018-06-12 | Stop reason: ALTCHOICE

## 2017-08-22 NOTE — TELEPHONE ENCOUNTER
----- Message from Anastasia Cheadle, MD sent at 8/20/2017  6:58 PM CDT -----  Chol's worse, please add lopid 600 mg bid, recheck cmp and lipid in 3 months. Vitamin D's low, need 5000 unit q daily vitamin D, recheck vitamin D 6 months.

## 2017-08-22 NOTE — TELEPHONE ENCOUNTER
I called pt at [phone] and verified 2 patient identifiers: name & . I discussed results and recommendations at length with patient. All questions answered.      When I went to place the order, there is an interaction between lopid & warfarin-pl

## 2017-08-23 ENCOUNTER — TELEPHONE (OUTPATIENT)
Dept: FAMILY MEDICINE CLINIC | Facility: CLINIC | Age: 57
End: 2017-08-23

## 2017-08-23 NOTE — TELEPHONE ENCOUNTER
Pt does not want to start Lopid at this time, he is concerned with it interacting with his Coumadin & he doesn't want to take any more medications. He is working on improving his diet. Please advise on this.

## 2017-08-23 NOTE — TELEPHONE ENCOUNTER
From: Jamie Gottron  To: Modesta Mclaughlin MD  Sent: 8/22/2017 7:22 PM CDT  Subject: Prescription Question    Please call me on 8-23 about new pills I am not picking them up yet. My blood work is changing every test do not want anything messing with it.   Thank

## 2017-08-24 NOTE — TELEPHONE ENCOUNTER
My chart message sent to patient advising that Dr. Grupo Ovalle is aware he does not want to start Lopid. Encouraged patient to continue with plan to repeat labs in November.

## 2017-09-05 ENCOUNTER — ANTI-COAG VISIT (OUTPATIENT)
Dept: HEMATOLOGY/ONCOLOGY | Facility: HOSPITAL | Age: 57
End: 2017-09-05

## 2017-09-05 DIAGNOSIS — I26.99 OTHER PULMONARY EMBOLISM WITHOUT ACUTE COR PULMONALE (HCC): ICD-10-CM

## 2017-09-05 LAB — INR: 2.8 (ref 0.8–1.2)

## 2017-10-02 ENCOUNTER — ANTI-COAG VISIT (OUTPATIENT)
Dept: HEMATOLOGY/ONCOLOGY | Facility: HOSPITAL | Age: 57
End: 2017-10-02

## 2017-10-02 DIAGNOSIS — I26.99 OTHER PULMONARY EMBOLISM WITHOUT ACUTE COR PULMONALE (HCC): ICD-10-CM

## 2017-10-16 ENCOUNTER — ANTI-COAG VISIT (OUTPATIENT)
Dept: HEMATOLOGY/ONCOLOGY | Facility: HOSPITAL | Age: 57
End: 2017-10-16

## 2017-10-16 DIAGNOSIS — I26.99 OTHER PULMONARY EMBOLISM WITHOUT ACUTE COR PULMONALE (HCC): ICD-10-CM

## 2017-10-30 ENCOUNTER — ANTI-COAG VISIT (OUTPATIENT)
Dept: HEMATOLOGY/ONCOLOGY | Facility: HOSPITAL | Age: 57
End: 2017-10-30

## 2017-10-30 DIAGNOSIS — I26.99 OTHER PULMONARY EMBOLISM WITHOUT ACUTE COR PULMONALE (HCC): ICD-10-CM

## 2017-11-06 ENCOUNTER — ANTI-COAG VISIT (OUTPATIENT)
Dept: HEMATOLOGY/ONCOLOGY | Facility: HOSPITAL | Age: 57
End: 2017-11-06

## 2017-11-06 DIAGNOSIS — I26.99 OTHER PULMONARY EMBOLISM WITHOUT ACUTE COR PULMONALE (HCC): ICD-10-CM

## 2017-11-13 ENCOUNTER — ANTI-COAG VISIT (OUTPATIENT)
Dept: HEMATOLOGY/ONCOLOGY | Facility: HOSPITAL | Age: 57
End: 2017-11-13

## 2017-11-13 DIAGNOSIS — I26.99 OTHER PULMONARY EMBOLISM WITHOUT ACUTE COR PULMONALE (HCC): ICD-10-CM

## 2017-12-04 ENCOUNTER — ANTI-COAG VISIT (OUTPATIENT)
Dept: HEMATOLOGY/ONCOLOGY | Facility: HOSPITAL | Age: 57
End: 2017-12-04

## 2017-12-04 DIAGNOSIS — I26.99 OTHER PULMONARY EMBOLISM WITHOUT ACUTE COR PULMONALE (HCC): ICD-10-CM

## 2017-12-18 DIAGNOSIS — I26.99 OTHER PULMONARY EMBOLISM WITHOUT ACUTE COR PULMONALE (HCC): ICD-10-CM

## 2017-12-18 RX ORDER — WARFARIN SODIUM 5 MG/1
TABLET ORAL
Qty: 135 TABLET | Refills: 3 | Status: SHIPPED | OUTPATIENT
Start: 2017-12-18 | End: 2019-01-12

## 2018-01-02 ENCOUNTER — MED REC SCAN ONLY (OUTPATIENT)
Dept: HEMATOLOGY/ONCOLOGY | Facility: HOSPITAL | Age: 58
End: 2018-01-02

## 2018-01-02 ENCOUNTER — ANTI-COAG VISIT (OUTPATIENT)
Dept: HEMATOLOGY/ONCOLOGY | Facility: HOSPITAL | Age: 58
End: 2018-01-02

## 2018-01-02 DIAGNOSIS — I26.99 OTHER PULMONARY EMBOLISM WITHOUT ACUTE COR PULMONALE (HCC): ICD-10-CM

## 2018-01-02 LAB — INR: 2.7 (ref 0.8–1.2)

## 2018-01-10 ENCOUNTER — TELEPHONE (OUTPATIENT)
Dept: FAMILY MEDICINE CLINIC | Facility: CLINIC | Age: 58
End: 2018-01-10

## 2018-01-10 DIAGNOSIS — E78.5 HYPERLIPIDEMIA, UNSPECIFIED HYPERLIPIDEMIA TYPE: ICD-10-CM

## 2018-01-10 DIAGNOSIS — E55.9 VITAMIN D DEFICIENCY: ICD-10-CM

## 2018-01-10 DIAGNOSIS — R73.01 IMPAIRED FASTING GLUCOSE: Primary | ICD-10-CM

## 2018-01-10 NOTE — TELEPHONE ENCOUNTER
Pt wants labs entered in Aug to be changed to [address] [place] [address].  Pt was here in the office. Given lab orders.

## 2018-01-11 LAB
ALBUMIN/GLOBULIN RATIO: 1.4 (CALC) (ref 1–2.5)
ALBUMIN: 4.4 G/DL (ref 3.6–5.1)
ALKALINE PHOSPHATASE: 51 U/L (ref 40–115)
ALT: 51 U/L (ref 9–46)
AST: 57 U/L (ref 10–35)
BILIRUBIN, TOTAL: 0.5 MG/DL (ref 0.2–1.2)
BUN: 19 MG/DL (ref 7–25)
CALCIUM: 9.8 MG/DL (ref 8.6–10.3)
CARBON DIOXIDE: 28 MMOL/L (ref 20–31)
CHLORIDE: 104 MMOL/L (ref 98–110)
CHOL/HDLC RATIO: 4.7 (CALC)
CHOLESTEROL, TOTAL: 256 MG/DL
CREATININE: 1.21 MG/DL (ref 0.7–1.33)
EGFR IF AFRICN AM: 75 ML/MIN/1.73M2
EGFR IF NONAFRICN AM: 65 ML/MIN/1.73M2
GLOBULIN: 3.1 G/DL (CALC) (ref 1.9–3.7)
GLUCOSE: 101 MG/DL (ref 65–99)
HDL CHOLESTEROL: 55 MG/DL
LDL-CHOLESTEROL: 164 MG/DL (CALC)
NON-HDL CHOLESTEROL: 201 MG/DL (CALC)
POTASSIUM: 5 MMOL/L (ref 3.5–5.3)
PROTEIN, TOTAL: 7.5 G/DL (ref 6.1–8.1)
SODIUM: 141 MMOL/L (ref 135–146)
TRIGLYCERIDES: 221 MG/DL
VITAMIN D, 25-OH, TOTAL: 37 NG/ML (ref 30–100)

## 2018-01-15 DIAGNOSIS — E78.00 ELEVATED CHOLESTEROL: Primary | ICD-10-CM

## 2018-01-15 DIAGNOSIS — R74.8 ELEVATED LIVER ENZYMES: ICD-10-CM

## 2018-01-16 ENCOUNTER — ANTI-COAG VISIT (OUTPATIENT)
Dept: HEMATOLOGY/ONCOLOGY | Facility: HOSPITAL | Age: 58
End: 2018-01-16

## 2018-01-16 DIAGNOSIS — I26.99 OTHER PULMONARY EMBOLISM WITHOUT ACUTE COR PULMONALE (HCC): ICD-10-CM

## 2018-01-16 LAB — INR: 2.4 (ref 0.8–1.2)

## 2018-01-26 DIAGNOSIS — E78.5 HYPERLIPIDEMIA, UNSPECIFIED HYPERLIPIDEMIA TYPE: ICD-10-CM

## 2018-01-29 ENCOUNTER — ANTI-COAG VISIT (OUTPATIENT)
Dept: HEMATOLOGY/ONCOLOGY | Facility: HOSPITAL | Age: 58
End: 2018-01-29

## 2018-01-29 ENCOUNTER — TELEPHONE (OUTPATIENT)
Dept: FAMILY MEDICINE CLINIC | Facility: CLINIC | Age: 58
End: 2018-01-29

## 2018-01-29 DIAGNOSIS — I26.99 OTHER PULMONARY EMBOLISM WITHOUT ACUTE COR PULMONALE (HCC): ICD-10-CM

## 2018-01-29 LAB — INR: 2.2 (ref 0.8–1.2)

## 2018-01-29 RX ORDER — OMEGA-3-ACID ETHYL ESTERS 1 G/1
2 CAPSULE, LIQUID FILLED ORAL 2 TIMES DAILY
Qty: 360 CAPSULE | Refills: 0 | Status: SHIPPED | OUTPATIENT
Start: 2018-01-29 | End: 2018-05-02

## 2018-01-29 NOTE — TELEPHONE ENCOUNTER
Pt is out of Omega-3-acid Ethyl Esters (LOVAZA) 1 g Oral Cap  Pt states he requested this refill last week. Pt needs refill.

## 2018-01-29 NOTE — TELEPHONE ENCOUNTER
From: Malka Pina  Sent: 1/26/2018 9:42 AM CST  Subject: Medication Renewal Request    Mayito Bender would like a refill of the following medications:     Omega-3-acid Ethyl Esters (LOVAZA) 1 g Oral Cap Rayna Weinstein MD]    Preferred pharmacy: \[facility]\""[facility]

## 2018-02-26 ENCOUNTER — ANTI-COAG VISIT (OUTPATIENT)
Dept: HEMATOLOGY/ONCOLOGY | Facility: HOSPITAL | Age: 58
End: 2018-02-26

## 2018-02-26 DIAGNOSIS — I26.99 OTHER PULMONARY EMBOLISM WITHOUT ACUTE COR PULMONALE (HCC): ICD-10-CM

## 2018-02-26 DIAGNOSIS — K21.00 REFLUX ESOPHAGITIS: ICD-10-CM

## 2018-02-26 LAB — INR: 2.6 (ref 0.8–1.2)

## 2018-02-27 RX ORDER — PANTOPRAZOLE SODIUM 40 MG/1
40 TABLET, DELAYED RELEASE ORAL DAILY
Qty: 90 TABLET | Refills: 1 | Status: SHIPPED | OUTPATIENT
Start: 2018-02-27 | End: 2018-08-31

## 2018-02-27 NOTE — TELEPHONE ENCOUNTER
From: Barbie Tena  Sent: 2/26/2018 8:04 PM CST  Subject: Medication Renewal Request    Yolanda Hurst would like a refill of the following medications:     Pantoprazole Sodium 40 MG Oral Tab EC Ludy Dolan MD]    Preferred pharmacy: [facility]

## 2018-02-27 NOTE — TELEPHONE ENCOUNTER
Medication(s) to Refill:   Pending Prescriptions Disp Refills    Pantoprazole Sodium 40 MG Oral Tab EC 90 tablet 1     Sig: Take 1 tablet (40 mg total) by mouth daily.              Reason for Medication Refill being sent to Provider / Reason Protocol Failed

## 2018-03-13 ENCOUNTER — ANTI-COAG VISIT (OUTPATIENT)
Dept: HEMATOLOGY/ONCOLOGY | Facility: HOSPITAL | Age: 58
End: 2018-03-13

## 2018-03-13 DIAGNOSIS — I26.99 OTHER PULMONARY EMBOLISM WITHOUT ACUTE COR PULMONALE (HCC): ICD-10-CM

## 2018-03-13 LAB — INR: 2.7 (ref 0.8–1.2)

## 2018-03-23 ENCOUNTER — TELEPHONE (OUTPATIENT)
Dept: HEMATOLOGY/ONCOLOGY | Facility: HOSPITAL | Age: 58
End: 2018-03-23

## 2018-03-27 ENCOUNTER — ANTI-COAG VISIT (OUTPATIENT)
Dept: HEMATOLOGY/ONCOLOGY | Facility: HOSPITAL | Age: 58
End: 2018-03-27

## 2018-03-27 DIAGNOSIS — I26.99 OTHER PULMONARY EMBOLISM WITHOUT ACUTE COR PULMONALE (HCC): ICD-10-CM

## 2018-03-27 LAB — INR: 2.3 (ref 0.8–1.2)

## 2018-04-10 ENCOUNTER — MED REC SCAN ONLY (OUTPATIENT)
Dept: HEMATOLOGY/ONCOLOGY | Facility: HOSPITAL | Age: 58
End: 2018-04-10

## 2018-04-10 ENCOUNTER — ANTI-COAG VISIT (OUTPATIENT)
Dept: HEMATOLOGY/ONCOLOGY | Facility: HOSPITAL | Age: 58
End: 2018-04-10

## 2018-04-10 DIAGNOSIS — I26.99 OTHER PULMONARY EMBOLISM WITHOUT ACUTE COR PULMONALE (HCC): ICD-10-CM

## 2018-04-23 ENCOUNTER — ANTI-COAG VISIT (OUTPATIENT)
Dept: HEMATOLOGY/ONCOLOGY | Facility: HOSPITAL | Age: 58
End: 2018-04-23

## 2018-04-23 DIAGNOSIS — I26.99 OTHER PULMONARY EMBOLISM WITHOUT ACUTE COR PULMONALE (HCC): ICD-10-CM

## 2018-05-02 DIAGNOSIS — E78.5 HYPERLIPIDEMIA, UNSPECIFIED HYPERLIPIDEMIA TYPE: ICD-10-CM

## 2018-05-02 RX ORDER — OMEGA-3-ACID ETHYL ESTERS 1 G/1
CAPSULE, LIQUID FILLED ORAL
Qty: 360 CAPSULE | Refills: 0 | Status: SHIPPED | OUTPATIENT
Start: 2018-05-02 | End: 2018-08-24

## 2018-05-07 ENCOUNTER — ANTI-COAG VISIT (OUTPATIENT)
Dept: HEMATOLOGY/ONCOLOGY | Facility: HOSPITAL | Age: 58
End: 2018-05-07

## 2018-05-07 DIAGNOSIS — I26.99 OTHER PULMONARY EMBOLISM WITHOUT ACUTE COR PULMONALE (HCC): ICD-10-CM

## 2018-05-21 ENCOUNTER — ANTI-COAG VISIT (OUTPATIENT)
Dept: HEMATOLOGY/ONCOLOGY | Facility: HOSPITAL | Age: 58
End: 2018-05-21

## 2018-05-21 DIAGNOSIS — I26.99 OTHER PULMONARY EMBOLISM WITHOUT ACUTE COR PULMONALE (HCC): ICD-10-CM

## 2018-05-29 ENCOUNTER — MED REC SCAN ONLY (OUTPATIENT)
Dept: HEMATOLOGY/ONCOLOGY | Facility: HOSPITAL | Age: 58
End: 2018-05-29

## 2018-05-29 ENCOUNTER — ANTI-COAG VISIT (OUTPATIENT)
Dept: HEMATOLOGY/ONCOLOGY | Facility: HOSPITAL | Age: 58
End: 2018-05-29

## 2018-05-29 DIAGNOSIS — I26.99 OTHER PULMONARY EMBOLISM WITHOUT ACUTE COR PULMONALE (HCC): ICD-10-CM

## 2018-06-01 ENCOUNTER — TELEPHONE (OUTPATIENT)
Dept: HEMATOLOGY/ONCOLOGY | Facility: HOSPITAL | Age: 58
End: 2018-06-01

## 2018-06-04 ENCOUNTER — ANTI-COAG VISIT (OUTPATIENT)
Dept: HEMATOLOGY/ONCOLOGY | Facility: HOSPITAL | Age: 58
End: 2018-06-04

## 2018-06-04 DIAGNOSIS — I26.99 OTHER PULMONARY EMBOLISM WITHOUT ACUTE COR PULMONALE (HCC): ICD-10-CM

## 2018-06-12 ENCOUNTER — OFFICE VISIT (OUTPATIENT)
Dept: SURGERY | Facility: CLINIC | Age: 58
End: 2018-06-12

## 2018-06-12 VITALS
DIASTOLIC BLOOD PRESSURE: 72 MMHG | HEART RATE: 60 BPM | WEIGHT: 292 LBS | BODY MASS INDEX: 41.34 KG/M2 | HEIGHT: 70.5 IN | SYSTOLIC BLOOD PRESSURE: 112 MMHG

## 2018-06-12 DIAGNOSIS — M54.16 LUMBAR RADICULITIS: Primary | ICD-10-CM

## 2018-06-12 PROCEDURE — 99214 OFFICE O/P EST MOD 30 MIN: CPT | Performed by: NURSE PRACTITIONER

## 2018-06-12 RX ORDER — METHYLPREDNISOLONE 4 MG/1
TABLET ORAL
Qty: 1 PACKAGE | Refills: 0 | Status: SHIPPED | OUTPATIENT
Start: 2018-06-12 | End: 2018-08-24 | Stop reason: ALTCHOICE

## 2018-06-12 RX ORDER — CYCLOBENZAPRINE HCL 10 MG
10 TABLET ORAL 3 TIMES DAILY PRN
Qty: 90 TABLET | Refills: 0 | Status: SHIPPED | OUTPATIENT
Start: 2018-06-12 | End: 2018-08-24 | Stop reason: ALTCHOICE

## 2018-06-12 NOTE — PATIENT INSTRUCTIONS
Refill policies:    • Allow 2-3 business days for refills; controlled substances may take longer.   • Contact your pharmacy at least 5 days prior to running out of medication and have them send an electronic request or submit request through the “request re entire amount billed. Precertification and Prior Authorizations: If your physician has recommended that you have a procedure or additional testing performed.   [facility]) will contact your insurance carrier to obtain pre-certi

## 2018-06-12 NOTE — PROGRESS NOTES
Name: Sara Bhatt   : 10/16/1960   DOS: 2018     Pain Clinic Follow Up Visit:   Sara Bhatt is a 59year old male who presents for new low back pain. Patient is c/o low back pain that radiates down the back of bilateral thighs.  The pain start area with palpation.      ASSESSMENT AND PLAN:   Lumbar radiculitis  (primary encounter diagnosis)      1) I recommend the patient get a MRI of lumbar spine to start off since this is a new pain and due to the radicular symptoms it appears to be related to

## 2018-06-13 ENCOUNTER — HOSPITAL ENCOUNTER (OUTPATIENT)
Dept: MRI IMAGING | Age: 58
Discharge: HOME | End: 2018-06-13
Attending: NURSE PRACTITIONER
Payer: COMMERCIAL

## 2018-06-13 ENCOUNTER — TELEPHONE (OUTPATIENT)
Dept: SURGERY | Facility: CLINIC | Age: 58
End: 2018-06-13

## 2018-06-13 DIAGNOSIS — M54.16 LUMBAR RADICULITIS: ICD-10-CM

## 2018-06-13 PROCEDURE — 72148 MRI LUMBAR SPINE W/O DYE: CPT | Performed by: NURSE PRACTITIONER

## 2018-06-13 NOTE — TELEPHONE ENCOUNTER
Pt's spouse states flexeril \"knocked him out\", is there anything else he can take?  please call back

## 2018-06-13 NOTE — TELEPHONE ENCOUNTER
LM for wife r/t pt's Flexeril. Informed pt should try for two weeks to know if the medication helps. Further suggested pt take 1/2 a dose during the day to see if that will be better tolerated.  Office phone number provided for additional questions or needs

## 2018-06-14 ENCOUNTER — TELEPHONE (OUTPATIENT)
Dept: SURGERY | Facility: CLINIC | Age: 58
End: 2018-06-14

## 2018-06-14 DIAGNOSIS — M54.16 LUMBAR RADICULITIS: Primary | ICD-10-CM

## 2018-06-14 NOTE — TELEPHONE ENCOUNTER
Called and the patient reviewed the MRI results which is noted with bulging disc and recommendation of LESI at L3-4 x 3. Risk and benefits of the procedure were discussed with patient and patient wanted to proceed with the procedure.  Patient transferred to

## 2018-06-14 NOTE — TELEPHONE ENCOUNTER
Spoke to Jonatan at . [address], codes 00154 are valid and billable, no prior authorization or predetermination required.    Call reference: [id number]  Call time=37:25

## 2018-06-15 NOTE — TELEPHONE ENCOUNTER
Response received to Medical Clearance Letter from Dr Skyla Griggs for upcoming Dr Braden Vicente procedures, endorsed to Pain Nurse Medical Clearance folder in y [address] & Mariela Taylor,[phone]

## 2018-06-19 NOTE — TELEPHONE ENCOUNTER
Spoke with pt and informed that Medical Clearance to hold Warfarin prior to procedures has been obtained from Dr. Luis Alberto Graves. Pt verbalized understanding to hold Warfarin for 5 days prior to each procedure. and had no additional questions or needs.      Copy o

## 2018-06-26 ENCOUNTER — ANTI-COAG VISIT (OUTPATIENT)
Dept: HEMATOLOGY/ONCOLOGY | Facility: HOSPITAL | Age: 58
End: 2018-06-26

## 2018-06-26 DIAGNOSIS — I26.99 OTHER PULMONARY EMBOLISM WITHOUT ACUTE COR PULMONALE (HCC): ICD-10-CM

## 2018-07-03 ENCOUNTER — ANTI-COAG VISIT (OUTPATIENT)
Dept: HEMATOLOGY/ONCOLOGY | Facility: HOSPITAL | Age: 58
End: 2018-07-03

## 2018-07-03 DIAGNOSIS — I26.99 OTHER PULMONARY EMBOLISM WITHOUT ACUTE COR PULMONALE (HCC): ICD-10-CM

## 2018-07-03 LAB — INR: 2.4 (ref 0.8–1.2)

## 2018-07-05 ENCOUNTER — TELEPHONE (OUTPATIENT)
Dept: SURGERY | Facility: CLINIC | Age: 58
End: 2018-07-05

## 2018-07-05 NOTE — TELEPHONE ENCOUNTER
LMTCB  Left message for patient, confirmed procedure date of 7/10/18 and to be checked in at outpatient registration at 115pm. Patient instructed to call pre-procedure line before procedure at [phone].  Patient instructed to call office if there are ad

## 2018-07-10 ENCOUNTER — SURGERY (OUTPATIENT)
Age: 58
End: 2018-07-10

## 2018-07-10 ENCOUNTER — APPOINTMENT (OUTPATIENT)
Dept: GENERAL RADIOLOGY | Facility: HOSPITAL | Age: 58
End: 2018-07-10
Attending: ANESTHESIOLOGY
Payer: COMMERCIAL

## 2018-07-10 ENCOUNTER — HOSPITAL ENCOUNTER (OUTPATIENT)
Facility: HOSPITAL | Age: 58
Setting detail: HOSPITAL OUTPATIENT SURGERY
Discharge: HOME | End: 2018-07-10
Attending: ANESTHESIOLOGY | Admitting: ANESTHESIOLOGY
Payer: COMMERCIAL

## 2018-07-10 VITALS
SYSTOLIC BLOOD PRESSURE: 142 MMHG | TEMPERATURE: 98 F | DIASTOLIC BLOOD PRESSURE: 94 MMHG | OXYGEN SATURATION: 94 % | HEART RATE: 60 BPM | RESPIRATION RATE: 20 BRPM

## 2018-07-10 DIAGNOSIS — M54.16 LUMBAR RADICULITIS: ICD-10-CM

## 2018-07-10 PROCEDURE — 3E0S33Z INTRODUCTION OF ANTI-INFLAMMATORY INTO EPIDURAL SPACE, PERCUTANEOUS APPROACH: ICD-10-PCS | Performed by: ANESTHESIOLOGY

## 2018-07-10 PROCEDURE — 99152 MOD SED SAME PHYS/QHP 5/>YRS: CPT | Performed by: ANESTHESIOLOGY

## 2018-07-10 PROCEDURE — B01B1ZZ FLUOROSCOPY OF SPINAL CORD USING LOW OSMOLAR CONTRAST: ICD-10-PCS | Performed by: ANESTHESIOLOGY

## 2018-07-10 RX ORDER — SODIUM CHLORIDE, SODIUM LACTATE, POTASSIUM CHLORIDE, CALCIUM CHLORIDE 600; 310; 30; 20 MG/100ML; MG/100ML; MG/100ML; MG/100ML
100 INJECTION, SOLUTION INTRAVENOUS CONTINUOUS
Status: DISCONTINUED | OUTPATIENT
Start: 2018-07-10 | End: 2018-07-10

## 2018-07-10 RX ORDER — MIDAZOLAM HYDROCHLORIDE 1 MG/ML
INJECTION INTRAMUSCULAR; INTRAVENOUS AS NEEDED
Status: DISCONTINUED | OUTPATIENT
Start: 2018-07-10 | End: 2018-07-10

## 2018-07-10 RX ORDER — LIDOCAINE HYDROCHLORIDE 10 MG/ML
INJECTION, SOLUTION EPIDURAL; INFILTRATION; INTRACAUDAL; PERINEURAL AS NEEDED
Status: DISCONTINUED | OUTPATIENT
Start: 2018-07-10 | End: 2018-07-10

## 2018-07-10 RX ORDER — ONDANSETRON 2 MG/ML
4 INJECTION INTRAMUSCULAR; INTRAVENOUS ONCE AS NEEDED
Status: DISCONTINUED | OUTPATIENT
Start: 2018-07-10 | End: 2018-07-10

## 2018-07-10 RX ORDER — DIPHENHYDRAMINE HYDROCHLORIDE 50 MG/ML
50 INJECTION INTRAMUSCULAR; INTRAVENOUS ONCE AS NEEDED
Status: DISCONTINUED | OUTPATIENT
Start: 2018-07-10 | End: 2018-07-10

## 2018-07-10 RX ORDER — DEXAMETHASONE SODIUM PHOSPHATE 10 MG/ML
INJECTION, SOLUTION INTRAMUSCULAR; INTRAVENOUS AS NEEDED
Status: DISCONTINUED | OUTPATIENT
Start: 2018-07-10 | End: 2018-07-10

## 2018-07-10 NOTE — OPERATIVE REPORT
[facility]  Operative Report  7/10/2018     Ariel Hamilton Patient Status:  Hospital Outpatient Surgery    10/16/1960 MRN [id number]   Location [address] [place] [address] Attending No att. providers found   Hosp Day # 0 PCP Екатерина Reilly epidurogram was obtained. Thereafter, dexamethasone 10 mg with normal saline of 5 mL in total volume of 6 mL was injected through the Tuohy needle. The needle was flushed with 1 mL lidocaine. The needle was withdrawn with the stylet intact in situ.   The

## 2018-07-10 NOTE — H&P
History & Physical Examination    Patient Name: Vergie Lundborg  MRN: [id number]  [facility]: [id number]  YOB: 1960    Pre-Operative Diagnosis:  Lumbar radiculitis [M54.16]    Present Illness: 47-year-old male patient with chronic low back pain sarahe Relation Age of Onset   • colon cancer [Other] [OTHER] Father    • diabetes mellitus [Other] [OTHER] Father    • Hypertension Mother         Smoking status: Never Smoker    Smokeless tobacco: Never Used    Alcohol use No       SYSTEM Check if Review is Nor

## 2018-07-11 ENCOUNTER — TELEPHONE (OUTPATIENT)
Dept: SURGERY | Facility: CLINIC | Age: 58
End: 2018-07-11

## 2018-07-11 NOTE — TELEPHONE ENCOUNTER
Spoke with Andrés Chavez regarding the patients  Demian Cotton. She stated that his hip feels worse today, one day post injection. I explained the reasons why this may occur and that it may take 3-5 days to feel relief. I advised using heat and ice for pain relief, we reviewed his medication and he can take his flexeril and pain medication for relief. If the pain persists after 5 days or becomes worse she was advised to call the office. Pt. Verbalized understanding and had no other questions at this time.

## 2018-07-19 ENCOUNTER — TELEPHONE (OUTPATIENT)
Dept: SURGERY | Facility: CLINIC | Age: 58
End: 2018-07-19

## 2018-07-19 NOTE — TELEPHONE ENCOUNTER
LMTCB, confirmed procedure date of 7/24/18 and to be checked in at outpatient registration at 9:00 am. Patient called pre-procedure line and understood instructions/Patient instructed to call pre-procedure line before procedure at [phone].  Patient ins

## 2018-07-24 ENCOUNTER — SURGERY (OUTPATIENT)
Age: 58
End: 2018-07-24

## 2018-07-24 ENCOUNTER — APPOINTMENT (OUTPATIENT)
Dept: GENERAL RADIOLOGY | Facility: HOSPITAL | Age: 58
End: 2018-07-24
Attending: ANESTHESIOLOGY
Payer: COMMERCIAL

## 2018-07-24 ENCOUNTER — HOSPITAL ENCOUNTER (OUTPATIENT)
Facility: HOSPITAL | Age: 58
Setting detail: HOSPITAL OUTPATIENT SURGERY
Discharge: HOME | End: 2018-07-24
Attending: ANESTHESIOLOGY | Admitting: ANESTHESIOLOGY
Payer: COMMERCIAL

## 2018-07-24 VITALS
RESPIRATION RATE: 18 BRPM | HEART RATE: 52 BPM | DIASTOLIC BLOOD PRESSURE: 63 MMHG | OXYGEN SATURATION: 97 % | SYSTOLIC BLOOD PRESSURE: 110 MMHG | TEMPERATURE: 98 F

## 2018-07-24 DIAGNOSIS — M54.16 LUMBAR RADICULITIS: ICD-10-CM

## 2018-07-24 PROCEDURE — 99152 MOD SED SAME PHYS/QHP 5/>YRS: CPT | Performed by: ANESTHESIOLOGY

## 2018-07-24 PROCEDURE — 3E0R33Z INTRODUCTION OF ANTI-INFLAMMATORY INTO SPINAL CANAL, PERCUTANEOUS APPROACH: ICD-10-PCS | Performed by: ANESTHESIOLOGY

## 2018-07-24 RX ORDER — ONDANSETRON 2 MG/ML
4 INJECTION INTRAMUSCULAR; INTRAVENOUS ONCE AS NEEDED
Status: DISCONTINUED | OUTPATIENT
Start: 2018-07-24 | End: 2018-07-24

## 2018-07-24 RX ORDER — SODIUM CHLORIDE, SODIUM LACTATE, POTASSIUM CHLORIDE, CALCIUM CHLORIDE 600; 310; 30; 20 MG/100ML; MG/100ML; MG/100ML; MG/100ML
100 INJECTION, SOLUTION INTRAVENOUS CONTINUOUS
Status: DISCONTINUED | OUTPATIENT
Start: 2018-07-24 | End: 2018-07-24

## 2018-07-24 RX ORDER — LIDOCAINE HYDROCHLORIDE 10 MG/ML
INJECTION, SOLUTION EPIDURAL; INFILTRATION; INTRACAUDAL; PERINEURAL AS NEEDED
Status: DISCONTINUED | OUTPATIENT
Start: 2018-07-24 | End: 2018-07-24 | Stop reason: HOSPADM

## 2018-07-24 RX ORDER — DEXAMETHASONE SODIUM PHOSPHATE 10 MG/ML
INJECTION, SOLUTION INTRAMUSCULAR; INTRAVENOUS AS NEEDED
Status: DISCONTINUED | OUTPATIENT
Start: 2018-07-24 | End: 2018-07-24 | Stop reason: HOSPADM

## 2018-07-24 RX ORDER — MIDAZOLAM HYDROCHLORIDE 1 MG/ML
INJECTION INTRAMUSCULAR; INTRAVENOUS AS NEEDED
Status: DISCONTINUED | OUTPATIENT
Start: 2018-07-24 | End: 2018-07-24 | Stop reason: HOSPADM

## 2018-07-24 RX ORDER — DIPHENHYDRAMINE HYDROCHLORIDE 50 MG/ML
50 INJECTION INTRAMUSCULAR; INTRAVENOUS ONCE AS NEEDED
Status: DISCONTINUED | OUTPATIENT
Start: 2018-07-24 | End: 2018-07-24

## 2018-07-24 NOTE — H&P
History & Physical Examination    Patient Name: Rut Salas  MRN: [id number]  CSN: [id number]  YOB: 1960    Pre-Operative Diagnosis:  Lumbar radiculitis [M54.16]    Present Illness: A 59year old male with low back pain is here for a lumba Smoking status: Never Smoker    Smokeless tobacco: Never Used    Alcohol use No       SYSTEM Check if Review is Normal Check if Physical Exam is Normal If not normal, please explain:   HEENT [x ] [x ]    NECK & BACK [ ] [ ] Tenderness to palpation b

## 2018-07-24 NOTE — OPERATIVE REPORT
[facility]  Operative Report  2018     Malka Pina Patient Status:  Hospital Outpatient Surgery    10/16/1960 MRN [id number]   [facility] No att[facility] providers found   Hosp Day # 0 PCP Rayna Weinstein, A good epidurogram was obtained. Thereafter, dexamethasone 10 mg with normal saline of 5 mL in total volume of 6 mL was injected through the Tuohy needle. The needle was flushed with 1 mL lidocaine.   The needle was withdrawn with the stylet intact in sit

## 2018-07-25 ENCOUNTER — CHARTING TRANS (OUTPATIENT)
Dept: OTHER | Age: 58
End: 2018-07-25

## 2018-07-27 NOTE — TELEPHONE ENCOUNTER
Courtesy called placed to patient for post procedure follow up. Patient stated receiving much relief (50%) from injection. Confirmed date and arrival time for upcoming procedure. Pt verbalized understanding to call with any questions or concerns.   Pt als

## 2018-07-30 ENCOUNTER — ANTI-COAG VISIT (OUTPATIENT)
Dept: HEMATOLOGY/ONCOLOGY | Facility: HOSPITAL | Age: 58
End: 2018-07-30

## 2018-07-30 DIAGNOSIS — I26.99 OTHER PULMONARY EMBOLISM WITHOUT ACUTE COR PULMONALE (HCC): ICD-10-CM

## 2018-07-30 LAB — INR: 1.6 (ref 0.8–1.2)

## 2018-08-03 ENCOUNTER — TELEPHONE (OUTPATIENT)
Dept: SURGERY | Facility: CLINIC | Age: 58
End: 2018-08-03

## 2018-08-03 NOTE — TELEPHONE ENCOUNTER
Spoke to patient's spouse, ok per HIPAA, confirmed procedure date of 08/07 and to be checked in at outpatient registration at 9:00 am. Patient instructed to call pre-procedure line before procedure at [phone].  Patient instructed to call office if ther

## 2018-08-07 ENCOUNTER — SURGERY (OUTPATIENT)
Age: 58
End: 2018-08-07

## 2018-08-07 ENCOUNTER — TELEPHONE (OUTPATIENT)
Dept: PAIN CLINIC | Facility: CLINIC | Age: 58
End: 2018-08-07

## 2018-08-07 ENCOUNTER — APPOINTMENT (OUTPATIENT)
Dept: GENERAL RADIOLOGY | Facility: HOSPITAL | Age: 58
End: 2018-08-07
Attending: ANESTHESIOLOGY
Payer: COMMERCIAL

## 2018-08-07 ENCOUNTER — HOSPITAL ENCOUNTER (OUTPATIENT)
Facility: HOSPITAL | Age: 58
Setting detail: HOSPITAL OUTPATIENT SURGERY
Discharge: HOME | End: 2018-08-07
Attending: ANESTHESIOLOGY | Admitting: ANESTHESIOLOGY
Payer: COMMERCIAL

## 2018-08-07 VITALS
DIASTOLIC BLOOD PRESSURE: 76 MMHG | SYSTOLIC BLOOD PRESSURE: 103 MMHG | RESPIRATION RATE: 18 BRPM | OXYGEN SATURATION: 98 % | HEART RATE: 52 BPM | TEMPERATURE: 98 F

## 2018-08-07 DIAGNOSIS — M54.16 LUMBAR RADICULITIS: ICD-10-CM

## 2018-08-07 PROCEDURE — 3E0R3BZ INTRODUCTION OF ANESTHETIC AGENT INTO SPINAL CANAL, PERCUTANEOUS APPROACH: ICD-10-PCS | Performed by: ANESTHESIOLOGY

## 2018-08-07 PROCEDURE — 99152 MOD SED SAME PHYS/QHP 5/>YRS: CPT | Performed by: ANESTHESIOLOGY

## 2018-08-07 PROCEDURE — 3E0R33Z INTRODUCTION OF ANTI-INFLAMMATORY INTO SPINAL CANAL, PERCUTANEOUS APPROACH: ICD-10-PCS | Performed by: ANESTHESIOLOGY

## 2018-08-07 RX ORDER — LIDOCAINE HYDROCHLORIDE 10 MG/ML
INJECTION, SOLUTION EPIDURAL; INFILTRATION; INTRACAUDAL; PERINEURAL AS NEEDED
Status: DISCONTINUED | OUTPATIENT
Start: 2018-08-07 | End: 2018-08-07 | Stop reason: HOSPADM

## 2018-08-07 RX ORDER — DEXAMETHASONE SODIUM PHOSPHATE 10 MG/ML
INJECTION, SOLUTION INTRAMUSCULAR; INTRAVENOUS AS NEEDED
Status: DISCONTINUED | OUTPATIENT
Start: 2018-08-07 | End: 2018-08-07 | Stop reason: HOSPADM

## 2018-08-07 RX ORDER — MIDAZOLAM HYDROCHLORIDE 1 MG/ML
INJECTION INTRAMUSCULAR; INTRAVENOUS AS NEEDED
Status: DISCONTINUED | OUTPATIENT
Start: 2018-08-07 | End: 2018-08-07 | Stop reason: HOSPADM

## 2018-08-07 RX ORDER — ONDANSETRON 2 MG/ML
4 INJECTION INTRAMUSCULAR; INTRAVENOUS ONCE AS NEEDED
Status: DISCONTINUED | OUTPATIENT
Start: 2018-08-07 | End: 2018-08-07

## 2018-08-07 RX ORDER — DIPHENHYDRAMINE HYDROCHLORIDE 50 MG/ML
50 INJECTION INTRAMUSCULAR; INTRAVENOUS ONCE AS NEEDED
Status: DISCONTINUED | OUTPATIENT
Start: 2018-08-07 | End: 2018-08-07

## 2018-08-07 NOTE — OPERATIVE REPORT
[facility]  Operative Report  2018     Sarah Mcdermott Patient Status:  Hospital Outpatient Surgery    10/16/1960 MRN [id number]   [facility] No att. providers found   Hosp Day # 0 PCP Umair Kong and lateral view under fluoroscopy. Omnipaque 180 was injected in 1 mL volume. A good epidurogram was obtained. Thereafter, dexamethasone 10 mg with normal saline of 5 mL in total volume of 6 mL was injected through the Tuohy needle.   The needle was flus

## 2018-08-07 NOTE — H&P
History & Physical Examination    Patient Name: Elias Prieto  MRN: [id number]  [facility]: [id number]  YOB: 1960    Pre-Operative Diagnosis:  Lumbar radiculitis [M54.16]    Present Illness: A 59year old male with low back pain is here for a lumba Smoking status: Never Smoker    Smokeless tobacco: Never Used    Alcohol use No       SYSTEM Check if Review is Normal Check if Physical Exam is Normal If not normal, please explain:   HEENT [x ] [x ]    NECK & BACK [ ] [ ] Tenderness to palpation b

## 2018-08-13 ENCOUNTER — ANTI-COAG VISIT (OUTPATIENT)
Dept: HEMATOLOGY/ONCOLOGY | Facility: HOSPITAL | Age: 58
End: 2018-08-13

## 2018-08-13 DIAGNOSIS — I26.99 OTHER PULMONARY EMBOLISM WITHOUT ACUTE COR PULMONALE (HCC): ICD-10-CM

## 2018-08-13 LAB — INR: 1.6 (ref 0.8–1.2)

## 2018-08-20 ENCOUNTER — TELEPHONE (OUTPATIENT)
Dept: FAMILY MEDICINE CLINIC | Facility: CLINIC | Age: 58
End: 2018-08-20

## 2018-08-20 ENCOUNTER — ANTI-COAG VISIT (OUTPATIENT)
Dept: HEMATOLOGY/ONCOLOGY | Facility: HOSPITAL | Age: 58
End: 2018-08-20

## 2018-08-20 ENCOUNTER — MED REC SCAN ONLY (OUTPATIENT)
Dept: HEMATOLOGY/ONCOLOGY | Facility: HOSPITAL | Age: 58
End: 2018-08-20

## 2018-08-20 DIAGNOSIS — I26.99 OTHER PULMONARY EMBOLISM WITHOUT ACUTE COR PULMONALE (HCC): ICD-10-CM

## 2018-08-20 LAB — INR: 2.7 (ref 0.8–1.2)

## 2018-08-20 NOTE — TELEPHONE ENCOUNTER
Printed copy of lab orders in PSR  file/patient has given permission for his spouse Román Weber to  the orders

## 2018-08-22 ENCOUNTER — TELEPHONE (OUTPATIENT)
Dept: FAMILY MEDICINE CLINIC | Facility: CLINIC | Age: 58
End: 2018-08-22

## 2018-08-22 DIAGNOSIS — Z13.21 ENCOUNTER FOR VITAMIN DEFICIENCY SCREENING: ICD-10-CM

## 2018-08-22 DIAGNOSIS — Z13.220 SCREENING FOR LIPID DISORDERS: ICD-10-CM

## 2018-08-22 DIAGNOSIS — Z12.5 SCREENING FOR MALIGNANT NEOPLASM OF PROSTATE: ICD-10-CM

## 2018-08-22 DIAGNOSIS — Z00.00 ROUTINE GENERAL MEDICAL EXAMINATION AT A HEALTH CARE FACILITY: Primary | ICD-10-CM

## 2018-08-22 DIAGNOSIS — Z13.0 SCREENING FOR ENDOCRINE, NUTRITIONAL, METABOLIC AND IMMUNITY DISORDER: ICD-10-CM

## 2018-08-22 DIAGNOSIS — Z13.29 SCREENING FOR ENDOCRINE, NUTRITIONAL, METABOLIC AND IMMUNITY DISORDER: ICD-10-CM

## 2018-08-22 DIAGNOSIS — Z13.0 SCREENING FOR IRON DEFICIENCY ANEMIA: ICD-10-CM

## 2018-08-22 DIAGNOSIS — Z13.228 SCREENING FOR ENDOCRINE, NUTRITIONAL, METABOLIC AND IMMUNITY DISORDER: ICD-10-CM

## 2018-08-22 DIAGNOSIS — Z13.29 SCREENING FOR THYROID DISORDER: ICD-10-CM

## 2018-08-22 DIAGNOSIS — Z13.21 SCREENING FOR ENDOCRINE, NUTRITIONAL, METABOLIC AND IMMUNITY DISORDER: ICD-10-CM

## 2018-08-22 NOTE — TELEPHONE ENCOUNTER
Annual lab orders placed in Epic for patient as requested. Called patient's spouse and spoke with her per HIPAA. Advised her of this information. She states that patient uses [facility] at [facility].   Advised her that we would fax the order there for emelia

## 2018-08-24 ENCOUNTER — OFFICE VISIT (OUTPATIENT)
Dept: PAIN CLINIC | Facility: CLINIC | Age: 58
End: 2018-08-24
Payer: COMMERCIAL

## 2018-08-24 VITALS
OXYGEN SATURATION: 95 % | DIASTOLIC BLOOD PRESSURE: 80 MMHG | BODY MASS INDEX: 38.51 KG/M2 | WEIGHT: 272 LBS | HEIGHT: 70.5 IN | HEART RATE: 62 BPM | SYSTOLIC BLOOD PRESSURE: 128 MMHG

## 2018-08-24 DIAGNOSIS — M46.1 SACROILIITIS (HCC): ICD-10-CM

## 2018-08-24 DIAGNOSIS — M54.16 LUMBAR RADICULITIS: Primary | ICD-10-CM

## 2018-08-24 DIAGNOSIS — M47.817 SPONDYLOSIS OF LUMBOSACRAL REGION, UNSPECIFIED SPINAL OSTEOARTHRITIS COMPLICATION STATUS: ICD-10-CM

## 2018-08-24 DIAGNOSIS — M47.816 LUMBAR FACET ARTHROPATHY: ICD-10-CM

## 2018-08-24 DIAGNOSIS — E78.5 HYPERLIPIDEMIA, UNSPECIFIED HYPERLIPIDEMIA TYPE: ICD-10-CM

## 2018-08-24 PROCEDURE — 99212 OFFICE O/P EST SF 10 MIN: CPT | Performed by: NURSE PRACTITIONER

## 2018-08-24 RX ORDER — OMEGA-3-ACID ETHYL ESTERS 1 G/1
CAPSULE, LIQUID FILLED ORAL
Qty: 120 CAPSULE | Refills: 0 | Status: SHIPPED | OUTPATIENT
Start: 2018-08-24 | End: 2018-08-24

## 2018-08-24 RX ORDER — OMEGA-3-ACID ETHYL ESTERS 1 G/1
CAPSULE, LIQUID FILLED ORAL
Qty: 360 CAPSULE | Refills: 0 | Status: SHIPPED | OUTPATIENT
Start: 2018-08-24 | End: 2018-10-18

## 2018-08-24 NOTE — TELEPHONE ENCOUNTER
Pt is out of the Omega 3 prescription, currently has physical scheduled for 08/31 and would need a short supply sent to [facility] on file to hold him over until appt. Call on Mobile if any questions.

## 2018-08-24 NOTE — PATIENT INSTRUCTIONS
Refill policies:    • Allow 2-3 business days for refills; controlled substances may take longer.   • Contact your pharmacy at least 5 days prior to running out of medication and have them send an electronic request or submit request through the “request re entire amount billed. Precertification and Prior Authorizations: If your physician has recommended that you have a procedure or additional testing performed.   [facility]) will contact your insurance carrier to obtain pre-certi

## 2018-08-24 NOTE — PROGRESS NOTES
Name: Barbie Tena   : 10/16/1960   DOS: 2018     Pain Clinic Follow Up Visit:   Barbie Tena is a 59year old male who presents for recheck of his chronic low back pain for 40 years.   He is status post lumbar epidural steroid injections on Aug Disp:  Rfl:          EXAM:   /80 (BP Location: Right arm, Patient Position: Sitting, Cuff Size: adult)   Pulse 62   Ht 70.5\"   Wt 272 lb   SpO2 95%   BMI 38.48 kg/m²   General: 59year old male in no acute distress.   Neurologic: Alert, oriented, art consultations:None    The patient indicates understanding of these issues and agrees to the plan. Return if symptoms worsen or fail to improve.     ALLISON Cummings, 8/24/2018, 10:04 AM

## 2018-08-27 ENCOUNTER — ANTI-COAG VISIT (OUTPATIENT)
Dept: HEMATOLOGY/ONCOLOGY | Facility: HOSPITAL | Age: 58
End: 2018-08-27

## 2018-08-27 DIAGNOSIS — I26.99 OTHER PULMONARY EMBOLISM WITHOUT ACUTE COR PULMONALE (HCC): ICD-10-CM

## 2018-08-27 LAB — INR: 3.5 (ref 0.8–1.2)

## 2018-08-28 LAB
ABSOLUTE BASOPHILS: 63 CELLS/UL (ref 0–200)
ABSOLUTE EOSINOPHILS: 160 CELLS/UL (ref 15–500)
ABSOLUTE LYMPHOCYTES: 2719 CELLS/UL (ref 850–3900)
ABSOLUTE MONOCYTES: 507 CELLS/UL (ref 200–950)
ABSOLUTE NEUTROPHILS: 2252 CELLS/UL (ref 1500–7800)
ALBUMIN/GLOBULIN RATIO: 1.6 (CALC) (ref 1–2.5)
ALBUMIN: 4.6 G/DL (ref 3.6–5.1)
ALKALINE PHOSPHATASE: 44 U/L (ref 40–115)
ALT: 33 U/L (ref 9–46)
AST: 33 U/L (ref 10–35)
BASOPHILS: 1.1 %
BILIRUBIN, TOTAL: 0.6 MG/DL (ref 0.2–1.2)
BUN: 19 MG/DL (ref 7–25)
CALCIUM: 9.9 MG/DL (ref 8.6–10.3)
CARBON DIOXIDE: 28 MMOL/L (ref 20–32)
CHLORIDE: 102 MMOL/L (ref 98–110)
CHOL/HDLC RATIO: 5.4 (CALC)
CHOLESTEROL, TOTAL: 220 MG/DL
CREATININE: 1.21 MG/DL (ref 0.7–1.33)
EGFR IF AFRICN AM: 75 ML/MIN/1.73M2
EGFR IF NONAFRICN AM: 65 ML/MIN/1.73M2
EOSINOPHILS: 2.8 %
GLOBULIN: 2.8 G/DL (CALC) (ref 1.9–3.7)
GLUCOSE: 88 MG/DL (ref 65–99)
HDL CHOLESTEROL: 41 MG/DL
HEMATOCRIT: 44 % (ref 38.5–50)
HEMOGLOBIN: 15.4 G/DL (ref 13.2–17.1)
LDL-CHOLESTEROL: 148 MG/DL (CALC)
LYMPHOCYTES: 47.7 %
MCH: 33.1 PG (ref 27–33)
MCHC: 35 G/DL (ref 32–36)
MCV: 94.6 FL (ref 80–100)
MONOCYTES: 8.9 %
MPV: 11.9 FL (ref 7.5–12.5)
NEUTROPHILS: 39.5 %
NON-HDL CHOLESTEROL: 179 MG/DL (CALC)
PLATELET COUNT: 177 THOUSAND/UL (ref 140–400)
POTASSIUM: 4.5 MMOL/L (ref 3.5–5.3)
PROTEIN, TOTAL: 7.4 G/DL (ref 6.1–8.1)
PSA, TOTAL: 0.4 NG/ML
RDW: 13.4 % (ref 11–15)
RED BLOOD CELL COUNT: 4.65 MILLION/UL (ref 4.2–5.8)
SODIUM: 140 MMOL/L (ref 135–146)
TRIGLYCERIDES: 169 MG/DL
TSH: 0.59 MIU/L (ref 0.4–4.5)
VITAMIN D, 1,25 (OH)2,$TOTAL: 41 PG/ML (ref 18–72)
VITAMIN D2, 1,25 (OH)2: <8 PG/ML
VITAMIN D3, 1,25 (OH)2: 41 PG/ML
WHITE BLOOD CELL COUNT: 5.7 THOUSAND/UL (ref 3.8–10.8)

## 2018-08-31 ENCOUNTER — OFFICE VISIT (OUTPATIENT)
Dept: FAMILY MEDICINE CLINIC | Facility: CLINIC | Age: 58
End: 2018-08-31
Payer: COMMERCIAL

## 2018-08-31 VITALS
RESPIRATION RATE: 16 BRPM | HEART RATE: 68 BPM | TEMPERATURE: 98 F | BODY MASS INDEX: 38.08 KG/M2 | HEIGHT: 70.5 IN | SYSTOLIC BLOOD PRESSURE: 120 MMHG | DIASTOLIC BLOOD PRESSURE: 78 MMHG | WEIGHT: 269 LBS

## 2018-08-31 DIAGNOSIS — I82.443 DEEP VEIN THROMBOSIS (DVT) OF TIBIAL VEIN OF BOTH LOWER EXTREMITIES, UNSPECIFIED CHRONICITY (HCC): ICD-10-CM

## 2018-08-31 DIAGNOSIS — Z12.11 SCREENING FOR COLON CANCER: ICD-10-CM

## 2018-08-31 DIAGNOSIS — E78.5 DYSLIPIDEMIA: ICD-10-CM

## 2018-08-31 DIAGNOSIS — L02.91 ABSCESS: ICD-10-CM

## 2018-08-31 DIAGNOSIS — Z00.00 ANNUAL PHYSICAL EXAM: Primary | ICD-10-CM

## 2018-08-31 DIAGNOSIS — K21.00 REFLUX ESOPHAGITIS: ICD-10-CM

## 2018-08-31 PROCEDURE — 99396 PREV VISIT EST AGE 40-64: CPT | Performed by: FAMILY MEDICINE

## 2018-08-31 RX ORDER — SULFAMETHOXAZOLE AND TRIMETHOPRIM 800; 160 MG/1; MG/1
1 TABLET ORAL 2 TIMES DAILY
Qty: 20 TABLET | Refills: 0 | Status: SHIPPED | OUTPATIENT
Start: 2018-08-31 | End: 2018-09-10

## 2018-08-31 RX ORDER — SULFAMETHOXAZOLE AND TRIMETHOPRIM 800; 160 MG/1; MG/1
1 TABLET ORAL 2 TIMES DAILY
Qty: 20 TABLET | Refills: 0 | Status: SHIPPED | OUTPATIENT
Start: 2018-08-31 | End: 2018-08-31

## 2018-08-31 RX ORDER — PANTOPRAZOLE SODIUM 40 MG/1
40 TABLET, DELAYED RELEASE ORAL DAILY
Qty: 90 TABLET | Refills: 1 | Status: SHIPPED | OUTPATIENT
Start: 2018-08-31 | End: 2019-02-20

## 2018-08-31 NOTE — PROGRESS NOTES
Chief Complaint:   Patient presents with:  Physical    HPI:   This is a 59year old male presenting for physical.     Patient has history of TG-currently on lovaza with no side effects, last TG's stable.      Patient has a history of DVT and pulmonary embol Relation Age of Onset   • colon cancer [OTHER] Father    • diabetes mellitus [OTHER] Father    • Cancer Father    • Diabetes Father    • Hypertension Mother      Allergies:    Darvocet [Propoxyph*    NAUSEA ONLY    Comment:A500 TABS; abdominal pain  Curren for dizziness, weakness, light-headedness and headaches. Hematological: Negative for adenopathy. Does not bruise/bleed easily. Psychiatric/Behavioral: Negative for suicidal ideas, behavioral problems, sleep disturbance and agitation.  The patient is not is not diaphoretic. Right upper back papular red raised slightly fluctuant rash mild tenderness   Psychiatric: He has a normal mood and affect. His behavior is normal. Judgment and thought content normal.        ASSESSMENT AND PLAN:     1.  Annual physica

## 2018-09-03 LAB — INR: 4.2 (ref 0.8–1.2)

## 2018-09-04 ENCOUNTER — ANTI-COAG VISIT (OUTPATIENT)
Dept: HEMATOLOGY/ONCOLOGY | Facility: HOSPITAL | Age: 58
End: 2018-09-04

## 2018-09-04 ENCOUNTER — TELEPHONE (OUTPATIENT)
Dept: HEMATOLOGY/ONCOLOGY | Facility: HOSPITAL | Age: 58
End: 2018-09-04

## 2018-09-04 DIAGNOSIS — I26.99 OTHER PULMONARY EMBOLISM WITHOUT ACUTE COR PULMONALE (HCC): ICD-10-CM

## 2018-09-04 NOTE — TELEPHONE ENCOUNTER
Pt LM with INR results from yesterday. INR 4.2. He was prescribed Bactrim on 8/31. LM for patient to call back for instructions- see anti-coag encounter.

## 2018-09-04 NOTE — TELEPHONE ENCOUNTER
Spoke with patient. He was placed on an abx for cyst on back. Plans to have this drained in the office- pt aware he is on coumadin. Pt read back instructions from INR encounter. He denies bleeding or bruising.

## 2018-09-06 PROCEDURE — 82270 OCCULT BLOOD FECES: CPT

## 2018-09-06 PROCEDURE — 82272 OCCULT BLD FECES 1-3 TESTS: CPT

## 2018-09-07 ENCOUNTER — OFFICE VISIT (OUTPATIENT)
Dept: FAMILY MEDICINE CLINIC | Facility: CLINIC | Age: 58
End: 2018-09-07
Payer: COMMERCIAL

## 2018-09-07 ENCOUNTER — TELEPHONE (OUTPATIENT)
Dept: FAMILY MEDICINE CLINIC | Facility: CLINIC | Age: 58
End: 2018-09-07

## 2018-09-07 ENCOUNTER — APPOINTMENT (OUTPATIENT)
Dept: LAB | Facility: HOSPITAL | Age: 58
End: 2018-09-07
Attending: OPHTHALMOLOGY
Payer: COMMERCIAL

## 2018-09-07 VITALS
HEIGHT: 70.5 IN | TEMPERATURE: 98 F | DIASTOLIC BLOOD PRESSURE: 70 MMHG | RESPIRATION RATE: 16 BRPM | SYSTOLIC BLOOD PRESSURE: 122 MMHG | BODY MASS INDEX: 37.94 KG/M2 | WEIGHT: 268 LBS | HEART RATE: 62 BPM

## 2018-09-07 DIAGNOSIS — K21.00 REFLUX ESOPHAGITIS: ICD-10-CM

## 2018-09-07 DIAGNOSIS — Z12.11 SCREENING FOR COLON CANCER: ICD-10-CM

## 2018-09-07 DIAGNOSIS — L02.91 ABSCESS: Primary | ICD-10-CM

## 2018-09-07 DIAGNOSIS — I82.443 DEEP VEIN THROMBOSIS (DVT) OF TIBIAL VEIN OF BOTH LOWER EXTREMITIES, UNSPECIFIED CHRONICITY (HCC): ICD-10-CM

## 2018-09-07 PROCEDURE — 10060 I&D ABSCESS SIMPLE/SINGLE: CPT | Performed by: FAMILY MEDICINE

## 2018-09-07 PROCEDURE — 99214 OFFICE O/P EST MOD 30 MIN: CPT | Performed by: FAMILY MEDICINE

## 2018-09-07 NOTE — TELEPHONE ENCOUNTER
The patient called the office stating he was in today to see dr. Suresh Lopez and was given a stool card to mail in however it asks for an ID number and the patient doesn't know where to find that.     Also the kit has 3 cards however the instructions say the pa

## 2018-09-07 NOTE — TELEPHONE ENCOUNTER
Spoke with wife Sobeida Ordoñez per LESLEE. Advised her to have him do all 3 cards, disregard ID number. Make sure name and  are on all 3 cards. Verbalized understanding.

## 2018-09-09 NOTE — PROGRESS NOTES
Chief Complaint:   Patient presents with:  Derm Problem    HPI:   This is a 59year old male presenting for right upper back abscess with minimal improvement with bactrim.      Patient has a history of DVT and pulmonary embolus currently on Coumadin 5 mg da (diabetes mellitus) Father    • Cancer Father    • Diabetes Father    • Hypertension Mother      Allergies:    Darvocet [Propoxyph*    NAUSEA ONLY    Comment:A500 TABS; abdominal pain  Current Meds:    Current Outpatient Medications:  Pantoprazole Sodium 4 for environmental allergies, food allergies and immunocompromised state. Neurological: Negative for dizziness, weakness, light-headedness and headaches. Hematological: Negative for adenopathy. Does not bruise/bleed easily.    Psychiatric/Behavioral: Neg deficit or motor deficit. Gait normal.   Skin: Skin is warm and dry. No lesion and no rash noted. He is not diaphoretic. Right upper back papular red raised slightly fluctuant rash mild tenderness   Psychiatric: He has a normal mood and affect.  His behav

## 2018-09-13 DIAGNOSIS — E78.5 HYPERLIPIDEMIA, UNSPECIFIED HYPERLIPIDEMIA TYPE: ICD-10-CM

## 2018-09-14 ENCOUNTER — TELEPHONE (OUTPATIENT)
Dept: HEMATOLOGY/ONCOLOGY | Facility: HOSPITAL | Age: 58
End: 2018-09-14

## 2018-09-14 ENCOUNTER — ANTI-COAG VISIT (OUTPATIENT)
Dept: HEMATOLOGY/ONCOLOGY | Facility: HOSPITAL | Age: 58
End: 2018-09-14

## 2018-09-14 ENCOUNTER — OFFICE VISIT (OUTPATIENT)
Dept: SURGERY | Facility: CLINIC | Age: 58
End: 2018-09-14
Payer: COMMERCIAL

## 2018-09-14 ENCOUNTER — TELEPHONE (OUTPATIENT)
Dept: SURGERY | Facility: CLINIC | Age: 58
End: 2018-09-14

## 2018-09-14 VITALS
BODY MASS INDEX: 37 KG/M2 | RESPIRATION RATE: 16 BRPM | SYSTOLIC BLOOD PRESSURE: 123 MMHG | DIASTOLIC BLOOD PRESSURE: 78 MMHG | HEART RATE: 69 BPM | WEIGHT: 265 LBS

## 2018-09-14 DIAGNOSIS — L72.3 SEBACEOUS CYST: Primary | ICD-10-CM

## 2018-09-14 DIAGNOSIS — Z01.818 PRE-OP TESTING: Primary | ICD-10-CM

## 2018-09-14 DIAGNOSIS — I26.99 OTHER PULMONARY EMBOLISM WITHOUT ACUTE COR PULMONALE (HCC): ICD-10-CM

## 2018-09-14 DIAGNOSIS — Z79.01 CURRENT USE OF LONG TERM ANTICOAGULATION: ICD-10-CM

## 2018-09-14 LAB — INR: 2.4 (ref 0.8–1.2)

## 2018-09-14 PROCEDURE — 99243 OFF/OP CNSLTJ NEW/EST LOW 30: CPT | Performed by: SURGERY

## 2018-09-14 RX ORDER — OMEGA-3-ACID ETHYL ESTERS 1 G/1
CAPSULE, LIQUID FILLED ORAL
Qty: 360 CAPSULE | Refills: 0 | OUTPATIENT
Start: 2018-09-14

## 2018-09-14 NOTE — H&P
New Patient Visit Note       Active Problems      1. Sebaceous cyst    2.  Current use of long term anticoagulation        Chief Complaint   Patient presents with:  Bump: rt side upper back      History of Present Illness     Pt seen at the request of Dr. Jarrett Morel RADIOFREQUENCY ABLATION OF THORACIC OR LUMBAR MEDIAL   BRANCH; Left      Comment:  Performed by Nakia Roberson MD at [facility] MAIN OR  9/1/2016: [address]; Right      Comment:  Performed by Nakia Roberson, Outpatient Medications:  Pantoprazole Sodium 40 MG Oral Tab EC Take 1 tablet (40 mg total) by mouth daily.  Disp: 90 tablet Rfl: 1   OMEGA-3-ACID ETHYL ESTERS 1 g Oral Cap TAKE 2 CAPSULES BY MOUTH TWICE DAILY Disp: 360 capsule Rfl: 0   WARFARIN SODIUM 5 MG right upper back. No erythema. No drainage. No fluctuance. Nursing note and vitals reviewed.           Assessment   Sebaceous cyst  (primary encounter diagnosis)  Current use of long term anticoagulation    Pt with a recently infected sebaceous cyst of

## 2018-09-15 ENCOUNTER — APPOINTMENT (OUTPATIENT)
Dept: LAB | Age: 58
End: 2018-09-15
Attending: SURGERY
Payer: COMMERCIAL

## 2018-09-15 DIAGNOSIS — Z01.818 PRE-OP TESTING: ICD-10-CM

## 2018-09-15 LAB
ATRIAL RATE: 56 BPM
P AXIS: -21 DEGREES
P-R INTERVAL: 142 MS
Q-T INTERVAL: 424 MS
QRS DURATION: 100 MS
QTC CALCULATION (BEZET): 409 MS
R AXIS: 39 DEGREES
T AXIS: 46 DEGREES
VENTRICULAR RATE: 56 BPM

## 2018-09-15 PROCEDURE — 93010 ELECTROCARDIOGRAM REPORT: CPT | Performed by: INTERNAL MEDICINE

## 2018-09-15 PROCEDURE — 93005 ELECTROCARDIOGRAM TRACING: CPT

## 2018-09-17 ENCOUNTER — TELEPHONE (OUTPATIENT)
Dept: FAMILY MEDICINE CLINIC | Facility: CLINIC | Age: 58
End: 2018-09-17

## 2018-09-17 NOTE — TELEPHONE ENCOUNTER
Patient returning our call. Please try him again at [phone]. Ok to leave a detailed message on his cell.

## 2018-09-17 NOTE — TELEPHONE ENCOUNTER
Called patient and spoke with him. Advised him of results and POC below. Patient states understanding. Patient is requesting provider information be sent to his My Chart. Information sent as requested.

## 2018-09-17 NOTE — TELEPHONE ENCOUNTER
----- Message from Haylee Bower MD sent at 9/16/2018  5:36 PM CDT -----  + needs c-scope, refer to dr. Mar Deluca.

## 2018-09-17 NOTE — TELEPHONE ENCOUNTER
Patient Shaye Pete called, he is at work and can't always answer phone but try him again at same number [phone]

## 2018-09-21 ENCOUNTER — LAB REQUISITION (OUTPATIENT)
Dept: LAB | Facility: HOSPITAL | Age: 58
End: 2018-09-21
Payer: COMMERCIAL

## 2018-09-21 DIAGNOSIS — L72.9 FOLLICULAR CYST OF SKIN AND SUBCUTANEOUS TISSUE: ICD-10-CM

## 2018-09-21 PROCEDURE — 88304 TISSUE EXAM BY PATHOLOGIST: CPT | Performed by: SURGERY

## 2018-09-28 ENCOUNTER — ANTI-COAG VISIT (OUTPATIENT)
Dept: HEMATOLOGY/ONCOLOGY | Facility: HOSPITAL | Age: 58
End: 2018-09-28

## 2018-09-28 DIAGNOSIS — I26.99 OTHER PULMONARY EMBOLISM WITHOUT ACUTE COR PULMONALE (HCC): ICD-10-CM

## 2018-09-28 LAB — INR: 1.4 (ref 0.8–1.2)

## 2018-10-05 ENCOUNTER — ANTI-COAG VISIT (OUTPATIENT)
Dept: HEMATOLOGY/ONCOLOGY | Facility: HOSPITAL | Age: 58
End: 2018-10-05

## 2018-10-05 ENCOUNTER — OFFICE VISIT (OUTPATIENT)
Dept: SURGERY | Facility: CLINIC | Age: 58
End: 2018-10-05
Payer: COMMERCIAL

## 2018-10-05 VITALS
WEIGHT: 265 LBS | DIASTOLIC BLOOD PRESSURE: 77 MMHG | BODY MASS INDEX: 37 KG/M2 | RESPIRATION RATE: 16 BRPM | SYSTOLIC BLOOD PRESSURE: 116 MMHG | HEART RATE: 54 BPM

## 2018-10-05 DIAGNOSIS — Z80.0 FAMILY HISTORY OF MALIGNANT NEOPLASM OF COLON: ICD-10-CM

## 2018-10-05 DIAGNOSIS — Z12.11 SCREENING FOR MALIGNANT NEOPLASM OF COLON: Primary | ICD-10-CM

## 2018-10-05 DIAGNOSIS — I26.99 OTHER PULMONARY EMBOLISM WITHOUT ACUTE COR PULMONALE (HCC): ICD-10-CM

## 2018-10-05 PROCEDURE — 99213 OFFICE O/P EST LOW 20 MIN: CPT | Performed by: SURGERY

## 2018-10-05 NOTE — PROGRESS NOTES
Post Operative Visit Note       Active Problems  1. Screening for malignant neoplasm of colon    2.  Family history of malignant neoplasm of colon         Chief Complaint   Patient presents with:  Post-Op:  excision of the right upper back cyst         Hist INTERLAMINAR EPIDURAL INJECTION; N/A      Comment:  Performed by Karina Chino MD at [facility] MAIN OR  3/27/2017: Yves Rowe;  Left      Comment:  Performed by Karina Chino MD at [facility] MAIN OR  2/14/2017: Verna Tijerina Occupational Exposure: Not Asked        Hobby Hazards: Not Asked        Sleep Concern: Not Asked        Stress Concern: No        Weight Concern: Yes        Special Diet: No        Back Care: Not Asked        Exercise: No        Bike Helmet: Not Asked adenopathy. Does not bruise/bleed easily. Psychiatric/Behavioral: Negative for behavioral problems and sleep disturbance.        Physical Findings   /77   Pulse 54   Resp 16   Wt 265 lb   BMI 37.49 kg/m²   Physical Exam   Constitutional: He appears

## 2018-10-12 ENCOUNTER — MED REC SCAN ONLY (OUTPATIENT)
Dept: HEMATOLOGY/ONCOLOGY | Facility: HOSPITAL | Age: 58
End: 2018-10-12

## 2018-10-12 ENCOUNTER — ANTI-COAG VISIT (OUTPATIENT)
Dept: HEMATOLOGY/ONCOLOGY | Facility: HOSPITAL | Age: 58
End: 2018-10-12

## 2018-10-12 DIAGNOSIS — I26.99 OTHER PULMONARY EMBOLISM WITHOUT ACUTE COR PULMONALE (HCC): ICD-10-CM

## 2018-10-18 DIAGNOSIS — E78.5 HYPERLIPIDEMIA, UNSPECIFIED HYPERLIPIDEMIA TYPE: ICD-10-CM

## 2018-10-18 RX ORDER — OMEGA-3-ACID ETHYL ESTERS 1 G/1
CAPSULE, LIQUID FILLED ORAL
Qty: 360 CAPSULE | Refills: 0 | Status: SHIPPED | OUTPATIENT
Start: 2018-10-18 | End: 2019-01-21

## 2018-10-18 NOTE — TELEPHONE ENCOUNTER
Patient needs the following medication sent to [facility] mail order:  OMEGA-3-ACID ETHYL ESTERS 1 g Oral Cap 360 capsule 0

## 2018-10-19 ENCOUNTER — ANTI-COAG VISIT (OUTPATIENT)
Dept: HEMATOLOGY/ONCOLOGY | Facility: HOSPITAL | Age: 58
End: 2018-10-19

## 2018-10-24 ENCOUNTER — TELEPHONE (OUTPATIENT)
Dept: SURGERY | Facility: CLINIC | Age: 58
End: 2018-10-24

## 2018-10-24 RX ORDER — SULFAMETHOXAZOLE AND TRIMETHOPRIM 800; 160 MG/1; MG/1
1 TABLET ORAL 2 TIMES DAILY
Refills: 0 | Status: CANCELLED | OUTPATIENT
Start: 2018-10-24

## 2018-10-24 NOTE — TELEPHONE ENCOUNTER
Patient is on coumadin 2.5mg on wednesdays and 5 mg on the remaining of the days. States has procedure on Friday 10/26 with RRP and has not dc'd the coumadin.     has not spoken to cardiologist regarding when to stop coumadin so unsure when he would

## 2018-10-31 VITALS
BODY MASS INDEX: 40.8 KG/M2 | WEIGHT: 285 LBS | TEMPERATURE: 98.6 F | HEIGHT: 70 IN | OXYGEN SATURATION: 97 % | SYSTOLIC BLOOD PRESSURE: 130 MMHG | HEART RATE: 91 BPM | RESPIRATION RATE: 16 BRPM | DIASTOLIC BLOOD PRESSURE: 86 MMHG

## 2018-11-02 ENCOUNTER — ANTI-COAG VISIT (OUTPATIENT)
Dept: HEMATOLOGY/ONCOLOGY | Facility: HOSPITAL | Age: 58
End: 2018-11-02

## 2018-11-02 DIAGNOSIS — I26.99 OTHER PULMONARY EMBOLISM WITHOUT ACUTE COR PULMONALE (HCC): ICD-10-CM

## 2018-11-08 ENCOUNTER — TELEPHONE (OUTPATIENT)
Dept: SURGERY | Facility: CLINIC | Age: 58
End: 2018-11-08

## 2018-11-08 NOTE — PROGRESS NOTES
ANTICOAGULATION CLEARANCE REQUEST    Date: 11/5/2018    Attention:  Dr. Zay Bhatti            Phone:  [phone]   Fax:  [phone]     Our mutual patient, Vergie Lundborg 10/16/1960 is scheduled for colonoscopy.   Date of procedure: 11/30/18    Our r

## 2018-11-08 NOTE — TELEPHONE ENCOUNTER
Spoke with pt. We received his coumadin management clearance from Dr. Deni Crawford. Instructed to hold his Coumadin starting 11/25 and resume after his colonoscopy is completed.  He V/U

## 2018-11-19 ENCOUNTER — MED REC SCAN ONLY (OUTPATIENT)
Dept: HEMATOLOGY/ONCOLOGY | Facility: HOSPITAL | Age: 58
End: 2018-11-19

## 2018-11-19 ENCOUNTER — IMAGING SERVICES (OUTPATIENT)
Dept: OTHER | Age: 58
End: 2018-11-19

## 2018-11-19 ENCOUNTER — CHARTING TRANS (OUTPATIENT)
Dept: OTHER | Age: 58
End: 2018-11-19

## 2018-11-19 ENCOUNTER — ANTI-COAG VISIT (OUTPATIENT)
Dept: HEMATOLOGY/ONCOLOGY | Facility: HOSPITAL | Age: 58
End: 2018-11-19

## 2018-11-19 DIAGNOSIS — I26.99 OTHER PULMONARY EMBOLISM WITHOUT ACUTE COR PULMONALE (HCC): ICD-10-CM

## 2018-11-26 ENCOUNTER — CHARTING TRANS (OUTPATIENT)
Dept: OTHER | Age: 58
End: 2018-11-26

## 2018-12-07 ENCOUNTER — ANTI-COAG VISIT (OUTPATIENT)
Dept: HEMATOLOGY/ONCOLOGY | Facility: HOSPITAL | Age: 58
End: 2018-12-07

## 2018-12-07 DIAGNOSIS — I26.99 OTHER PULMONARY EMBOLISM WITHOUT ACUTE COR PULMONALE (HCC): ICD-10-CM

## 2018-12-14 ENCOUNTER — ANTI-COAG VISIT (OUTPATIENT)
Dept: HEMATOLOGY/ONCOLOGY | Facility: HOSPITAL | Age: 58
End: 2018-12-14

## 2018-12-14 ENCOUNTER — MED REC SCAN ONLY (OUTPATIENT)
Dept: HEMATOLOGY/ONCOLOGY | Facility: HOSPITAL | Age: 58
End: 2018-12-14

## 2018-12-14 ENCOUNTER — OFFICE VISIT (OUTPATIENT)
Dept: SURGERY | Facility: CLINIC | Age: 58
End: 2018-12-14
Payer: COMMERCIAL

## 2018-12-14 VITALS
DIASTOLIC BLOOD PRESSURE: 78 MMHG | SYSTOLIC BLOOD PRESSURE: 114 MMHG | BODY MASS INDEX: 37 KG/M2 | RESPIRATION RATE: 16 BRPM | WEIGHT: 265 LBS | HEART RATE: 70 BPM

## 2018-12-14 DIAGNOSIS — I26.99 OTHER PULMONARY EMBOLISM WITHOUT ACUTE COR PULMONALE (HCC): ICD-10-CM

## 2018-12-14 DIAGNOSIS — K57.30 DIVERTICULOSIS OF LARGE INTESTINE WITHOUT DIVERTICULITIS: ICD-10-CM

## 2018-12-14 DIAGNOSIS — Z80.0 FAMILY HISTORY OF MALIGNANT NEOPLASM OF COLON: Primary | ICD-10-CM

## 2018-12-14 PROCEDURE — 99212 OFFICE O/P EST SF 10 MIN: CPT | Performed by: SURGERY

## 2018-12-14 NOTE — PROGRESS NOTES
Follow Up Visit Note       Active Problems      1. Family history of malignant neoplasm of colon    2. Diverticulosis of large intestine without diverticulitis          Chief Complaint   Patient presents with:  Colonoscopy: follow up.          History of Pr 2/14/2017    Performed by Maxine Lehman MD at [address] [place] [address] Left 9/15/2016    Performed by Maxine Lehman MD at [address] [place] [address] Acetaminophen (ARTHRITIS PAIN OR) Take by mouth. Disp:  Rfl:    Cholecalciferol (VITAMIN D3) 2000 units Oral Tab Take by mouth. Disp:  Rfl:         Review of Systems  The Review of Systems has been reviewed by me during today.   Review of Systems   [facility] encounter. Imaging & Referrals   None    Follow Up  Return in about 3 years (around 12/14/2021).     Lenin Shukla MD

## 2018-12-21 ENCOUNTER — MED REC SCAN ONLY (OUTPATIENT)
Dept: HEMATOLOGY/ONCOLOGY | Facility: HOSPITAL | Age: 58
End: 2018-12-21

## 2018-12-21 ENCOUNTER — ANTI-COAG VISIT (OUTPATIENT)
Dept: HEMATOLOGY/ONCOLOGY | Facility: HOSPITAL | Age: 58
End: 2018-12-21

## 2018-12-21 DIAGNOSIS — I26.99 OTHER PULMONARY EMBOLISM WITHOUT ACUTE COR PULMONALE (HCC): ICD-10-CM

## 2018-12-28 ENCOUNTER — ANTI-COAG VISIT (OUTPATIENT)
Dept: HEMATOLOGY/ONCOLOGY | Facility: HOSPITAL | Age: 58
End: 2018-12-28

## 2018-12-28 DIAGNOSIS — I26.99 OTHER PULMONARY EMBOLISM WITHOUT ACUTE COR PULMONALE (HCC): ICD-10-CM

## 2019-01-04 ENCOUNTER — ANTI-COAG VISIT (OUTPATIENT)
Dept: HEMATOLOGY/ONCOLOGY | Facility: HOSPITAL | Age: 59
End: 2019-01-04

## 2019-01-04 DIAGNOSIS — I26.99 OTHER PULMONARY EMBOLISM WITHOUT ACUTE COR PULMONALE (HCC): ICD-10-CM

## 2019-01-04 LAB — INR: 2.8 (ref 0.8–1.2)

## 2019-01-11 ENCOUNTER — ANTI-COAG VISIT (OUTPATIENT)
Dept: HEMATOLOGY/ONCOLOGY | Facility: HOSPITAL | Age: 59
End: 2019-01-11

## 2019-01-11 DIAGNOSIS — I26.99 OTHER PULMONARY EMBOLISM WITHOUT ACUTE COR PULMONALE (HCC): ICD-10-CM

## 2019-01-11 LAB — INR: 2.6 (ref 0.8–1.2)

## 2019-01-12 DIAGNOSIS — I26.99 OTHER PULMONARY EMBOLISM WITHOUT ACUTE COR PULMONALE (HCC): ICD-10-CM

## 2019-01-14 RX ORDER — WARFARIN SODIUM 5 MG/1
TABLET ORAL
Qty: 135 TABLET | Refills: 1 | Status: SHIPPED | OUTPATIENT
Start: 2019-01-14 | End: 2019-09-06

## 2019-01-15 ENCOUNTER — TELEPHONE (OUTPATIENT)
Dept: HEMATOLOGY/ONCOLOGY | Facility: HOSPITAL | Age: 59
End: 2019-01-15

## 2019-01-15 NOTE — TELEPHONE ENCOUNTER
Pt requesting refill for Coumadin. Notified him that refill was sent in yesterday to [facility] requested. Pt will call pharmacy to clarify. Will call back if they don't have it.

## 2019-01-21 DIAGNOSIS — E78.5 HYPERLIPIDEMIA, UNSPECIFIED HYPERLIPIDEMIA TYPE: ICD-10-CM

## 2019-01-21 RX ORDER — OMEGA-3-ACID ETHYL ESTERS 1 G/1
CAPSULE, LIQUID FILLED ORAL
Qty: 360 CAPSULE | Refills: 0 | Status: SHIPPED | OUTPATIENT
Start: 2019-01-21 | End: 2019-04-19

## 2019-01-21 NOTE — TELEPHONE ENCOUNTER
Omega-3-acid Ethyl Esters 1 g Oral Cap Sig: TAKE 2 CAPSULES BY MOUTH TWICE DAILY    Please send refill to [facility] mail order

## 2019-01-23 ENCOUNTER — MED REC SCAN ONLY (OUTPATIENT)
Dept: FAMILY MEDICINE CLINIC | Facility: CLINIC | Age: 59
End: 2019-01-23

## 2019-01-25 ENCOUNTER — ANTI-COAG VISIT (OUTPATIENT)
Dept: HEMATOLOGY/ONCOLOGY | Facility: HOSPITAL | Age: 59
End: 2019-01-25

## 2019-01-25 DIAGNOSIS — I26.99 OTHER PULMONARY EMBOLISM WITHOUT ACUTE COR PULMONALE (HCC): ICD-10-CM

## 2019-01-25 LAB — INR: 2.7 (ref 0.8–1.2)

## 2019-02-08 ENCOUNTER — ANTI-COAG VISIT (OUTPATIENT)
Dept: HEMATOLOGY/ONCOLOGY | Facility: HOSPITAL | Age: 59
End: 2019-02-08

## 2019-02-08 DIAGNOSIS — I26.99 OTHER PULMONARY EMBOLISM WITHOUT ACUTE COR PULMONALE (HCC): ICD-10-CM

## 2019-02-08 LAB — INR: 2.5 (ref 0.8–1.2)

## 2019-02-20 DIAGNOSIS — K21.00 REFLUX ESOPHAGITIS: ICD-10-CM

## 2019-02-20 RX ORDER — PANTOPRAZOLE SODIUM 40 MG/1
40 TABLET, DELAYED RELEASE ORAL DAILY
Qty: 90 TABLET | Refills: 0 | Status: SHIPPED | OUTPATIENT
Start: 2019-02-20 | End: 2019-05-04

## 2019-02-20 NOTE — TELEPHONE ENCOUNTER
Pt needs a refill for Pantoprazole Sodium 40 MG Oral Tab EC   [facility]

## 2019-02-20 NOTE — TELEPHONE ENCOUNTER
Medication(s) to Refill:   Requested Prescriptions     Pending Prescriptions Disp Refills   • Pantoprazole Sodium 40 MG Oral Tab EC 90 tablet 1     Sig: Take 1 tablet (40 mg total) by mouth daily.          Reason for Medication Refill being sent to Provider

## 2019-02-22 ENCOUNTER — ANTI-COAG VISIT (OUTPATIENT)
Dept: HEMATOLOGY/ONCOLOGY | Facility: HOSPITAL | Age: 59
End: 2019-02-22

## 2019-02-22 DIAGNOSIS — I26.99 OTHER PULMONARY EMBOLISM WITHOUT ACUTE COR PULMONALE (HCC): ICD-10-CM

## 2019-02-22 LAB — INR: 2.6 (ref 0.8–1.2)

## 2019-03-08 ENCOUNTER — ANTI-COAG VISIT (OUTPATIENT)
Dept: HEMATOLOGY/ONCOLOGY | Facility: HOSPITAL | Age: 59
End: 2019-03-08

## 2019-03-08 DIAGNOSIS — I26.99 OTHER PULMONARY EMBOLISM WITHOUT ACUTE COR PULMONALE (HCC): ICD-10-CM

## 2019-03-08 LAB — INR: 2.6 (ref 0.8–1.2)

## 2019-04-05 ENCOUNTER — ANTI-COAG VISIT (OUTPATIENT)
Dept: HEMATOLOGY/ONCOLOGY | Facility: HOSPITAL | Age: 59
End: 2019-04-05

## 2019-04-05 DIAGNOSIS — I26.99 OTHER PULMONARY EMBOLISM WITHOUT ACUTE COR PULMONALE (HCC): ICD-10-CM

## 2019-04-19 ENCOUNTER — MED REC SCAN ONLY (OUTPATIENT)
Dept: HEMATOLOGY/ONCOLOGY | Facility: HOSPITAL | Age: 59
End: 2019-04-19

## 2019-04-19 ENCOUNTER — ANTI-COAG VISIT (OUTPATIENT)
Dept: HEMATOLOGY/ONCOLOGY | Facility: HOSPITAL | Age: 59
End: 2019-04-19

## 2019-04-19 DIAGNOSIS — I26.99 OTHER PULMONARY EMBOLISM WITHOUT ACUTE COR PULMONALE (HCC): ICD-10-CM

## 2019-04-19 DIAGNOSIS — E78.5 HYPERLIPIDEMIA, UNSPECIFIED HYPERLIPIDEMIA TYPE: ICD-10-CM

## 2019-04-19 RX ORDER — OMEGA-3-ACID ETHYL ESTERS 1 G/1
CAPSULE, LIQUID FILLED ORAL
Qty: 360 CAPSULE | Refills: 0 | Status: SHIPPED | OUTPATIENT
Start: 2019-04-19 | End: 2019-07-19

## 2019-04-19 NOTE — TELEPHONE ENCOUNTER
Patients wife Tyson Vieyra called she is on the hippa, patient needs a refill for Omega-3-acid Ethyl Esters 1 g Oral Cap please send through to [facility]. Patient is scheduled for 5/4/19 for his follow up.

## 2019-04-19 NOTE — TELEPHONE ENCOUNTER
Spoke with patients wife Mikhail Reyes informed her that his medication was approved and sent to [facility].

## 2019-04-19 NOTE — TELEPHONE ENCOUNTER
Passed protocol. Rx sent. PSR: Pls notify pt that refill of Omega-3-acid Ethyl Esters 1 g Oral Cap sent to [facility]. Thanks.

## 2019-05-03 ENCOUNTER — ANTI-COAG VISIT (OUTPATIENT)
Dept: HEMATOLOGY/ONCOLOGY | Facility: HOSPITAL | Age: 59
End: 2019-05-03

## 2019-05-03 DIAGNOSIS — I26.99 OTHER PULMONARY EMBOLISM WITHOUT ACUTE COR PULMONALE (HCC): ICD-10-CM

## 2019-05-04 ENCOUNTER — OFFICE VISIT (OUTPATIENT)
Dept: FAMILY MEDICINE CLINIC | Facility: CLINIC | Age: 59
End: 2019-05-04
Payer: COMMERCIAL

## 2019-05-04 VITALS
SYSTOLIC BLOOD PRESSURE: 118 MMHG | WEIGHT: 264 LBS | DIASTOLIC BLOOD PRESSURE: 76 MMHG | HEART RATE: 58 BPM | HEIGHT: 70.5 IN | BODY MASS INDEX: 37.37 KG/M2 | TEMPERATURE: 98 F | RESPIRATION RATE: 16 BRPM

## 2019-05-04 DIAGNOSIS — Z13.228 SCREENING FOR ENDOCRINE, NUTRITIONAL, METABOLIC AND IMMUNITY DISORDER: ICD-10-CM

## 2019-05-04 DIAGNOSIS — Z13.0 SCREENING FOR ENDOCRINE, NUTRITIONAL, METABOLIC AND IMMUNITY DISORDER: ICD-10-CM

## 2019-05-04 DIAGNOSIS — Z78.9 RUBELLA IMMUNE STATUS NOT KNOWN: ICD-10-CM

## 2019-05-04 DIAGNOSIS — Z13.29 SCREENING FOR ENDOCRINE, NUTRITIONAL, METABOLIC AND IMMUNITY DISORDER: ICD-10-CM

## 2019-05-04 DIAGNOSIS — K21.00 REFLUX ESOPHAGITIS: ICD-10-CM

## 2019-05-04 DIAGNOSIS — Z13.21 SCREENING FOR ENDOCRINE, NUTRITIONAL, METABOLIC AND IMMUNITY DISORDER: ICD-10-CM

## 2019-05-04 DIAGNOSIS — D69.6 THROMBOCYTOPENIA (HCC): Primary | ICD-10-CM

## 2019-05-04 DIAGNOSIS — I82.443 DEEP VEIN THROMBOSIS (DVT) OF TIBIAL VEIN OF BOTH LOWER EXTREMITIES, UNSPECIFIED CHRONICITY (HCC): ICD-10-CM

## 2019-05-04 PROCEDURE — 99214 OFFICE O/P EST MOD 30 MIN: CPT | Performed by: FAMILY MEDICINE

## 2019-05-04 RX ORDER — PANTOPRAZOLE SODIUM 40 MG/1
40 TABLET, DELAYED RELEASE ORAL DAILY
Qty: 90 TABLET | Refills: 1 | Status: SHIPPED | OUTPATIENT
Start: 2019-05-04 | End: 2019-10-08

## 2019-05-17 ENCOUNTER — ANTI-COAG VISIT (OUTPATIENT)
Dept: HEMATOLOGY/ONCOLOGY | Facility: HOSPITAL | Age: 59
End: 2019-05-17

## 2019-05-17 DIAGNOSIS — I26.99 OTHER PULMONARY EMBOLISM WITHOUT ACUTE COR PULMONALE (HCC): ICD-10-CM

## 2019-05-31 ENCOUNTER — ANTI-COAG VISIT (OUTPATIENT)
Dept: HEMATOLOGY/ONCOLOGY | Facility: HOSPITAL | Age: 59
End: 2019-05-31

## 2019-05-31 DIAGNOSIS — I26.99 OTHER PULMONARY EMBOLISM WITHOUT ACUTE COR PULMONALE (HCC): ICD-10-CM

## 2019-06-12 ENCOUNTER — SOCIAL WORK SERVICES (OUTPATIENT)
Dept: HEMATOLOGY/ONCOLOGY | Facility: HOSPITAL | Age: 59
End: 2019-06-12

## 2019-06-12 NOTE — PROGRESS NOTES
SW prepared letter for patient regarding restrictions for work and faxed to [employer] at [phone].

## 2019-06-14 ENCOUNTER — ANTI-COAG VISIT (OUTPATIENT)
Dept: HEMATOLOGY/ONCOLOGY | Facility: HOSPITAL | Age: 59
End: 2019-06-14

## 2019-06-14 DIAGNOSIS — I26.99 OTHER PULMONARY EMBOLISM WITHOUT ACUTE COR PULMONALE (HCC): ICD-10-CM

## 2019-07-01 ENCOUNTER — ANTI-COAG VISIT (OUTPATIENT)
Dept: HEMATOLOGY/ONCOLOGY | Facility: HOSPITAL | Age: 59
End: 2019-07-01

## 2019-07-01 DIAGNOSIS — I26.99 OTHER PULMONARY EMBOLISM WITHOUT ACUTE COR PULMONALE (HCC): ICD-10-CM

## 2019-07-01 LAB — INR: 3.1 (ref 0.8–1.2)

## 2019-07-12 ENCOUNTER — ANTI-COAG VISIT (OUTPATIENT)
Dept: HEMATOLOGY/ONCOLOGY | Facility: HOSPITAL | Age: 59
End: 2019-07-12

## 2019-07-12 DIAGNOSIS — I26.99 OTHER PULMONARY EMBOLISM WITHOUT ACUTE COR PULMONALE (HCC): ICD-10-CM

## 2019-07-12 LAB — INR: 3 (ref 0.8–1.2)

## 2019-07-19 DIAGNOSIS — E78.5 HYPERLIPIDEMIA, UNSPECIFIED HYPERLIPIDEMIA TYPE: ICD-10-CM

## 2019-07-19 RX ORDER — OMEGA-3-ACID ETHYL ESTERS 1 G/1
CAPSULE, LIQUID FILLED ORAL
Qty: 360 CAPSULE | Refills: 0 | Status: SHIPPED | OUTPATIENT
Start: 2019-07-19 | End: 2019-07-19

## 2019-07-19 NOTE — TELEPHONE ENCOUNTER
Spoke with patient he said that he cant come in labs yet. He said he needs this refill now. Patient said that his insurance will not pay for the lab tests until August 2019.

## 2019-07-22 RX ORDER — OMEGA-3-ACID ETHYL ESTERS 1 G/1
CAPSULE, LIQUID FILLED ORAL
Qty: 30 CAPSULE | Refills: 0 | Status: SHIPPED | OUTPATIENT
Start: 2019-07-22 | End: 2020-06-13

## 2019-08-23 ENCOUNTER — ANTI-COAG VISIT (OUTPATIENT)
Dept: HEMATOLOGY/ONCOLOGY | Facility: HOSPITAL | Age: 59
End: 2019-08-23

## 2019-08-23 DIAGNOSIS — I26.99 OTHER PULMONARY EMBOLISM WITHOUT ACUTE COR PULMONALE (HCC): ICD-10-CM

## 2019-08-23 LAB — INR: 2.6 (ref 0.8–1.2)

## 2019-09-03 ENCOUNTER — TELEPHONE (OUTPATIENT)
Dept: FAMILY MEDICINE CLINIC | Facility: CLINIC | Age: 59
End: 2019-09-03

## 2019-09-03 DIAGNOSIS — Z13.228 SCREENING FOR ENDOCRINE, NUTRITIONAL, METABOLIC AND IMMUNITY DISORDER: ICD-10-CM

## 2019-09-03 DIAGNOSIS — Z13.29 SCREENING FOR THYROID DISORDER: ICD-10-CM

## 2019-09-03 DIAGNOSIS — Z12.5 ENCOUNTER FOR SCREENING FOR MALIGNANT NEOPLASM OF PROSTATE: ICD-10-CM

## 2019-09-03 DIAGNOSIS — Z13.21 SCREENING FOR ENDOCRINE, NUTRITIONAL, METABOLIC AND IMMUNITY DISORDER: ICD-10-CM

## 2019-09-03 DIAGNOSIS — Z13.0 SCREENING FOR IRON DEFICIENCY ANEMIA: ICD-10-CM

## 2019-09-03 DIAGNOSIS — Z13.220 SCREENING FOR LIPID DISORDERS: ICD-10-CM

## 2019-09-03 DIAGNOSIS — Z13.0 SCREENING FOR ENDOCRINE, NUTRITIONAL, METABOLIC AND IMMUNITY DISORDER: ICD-10-CM

## 2019-09-03 DIAGNOSIS — Z00.00 ROUTINE GENERAL MEDICAL EXAMINATION AT A HEALTH CARE FACILITY: Primary | ICD-10-CM

## 2019-09-03 DIAGNOSIS — Z13.29 SCREENING FOR ENDOCRINE, NUTRITIONAL, METABOLIC AND IMMUNITY DISORDER: ICD-10-CM

## 2019-09-03 NOTE — TELEPHONE ENCOUNTER
Lab orders placed in Epic for patient to have completed at [facility].  Patient notified of this information via My Chart.

## 2019-09-03 NOTE — TELEPHONE ENCOUNTER
Patient is coming into the office for a physical on 9/21/2019 and would like to have his blood work done prior to the appointment.     Patient would like orders sent to [facility]

## 2019-09-06 ENCOUNTER — ANTI-COAG VISIT (OUTPATIENT)
Dept: HEMATOLOGY/ONCOLOGY | Facility: HOSPITAL | Age: 59
End: 2019-09-06

## 2019-09-06 DIAGNOSIS — I26.99 OTHER PULMONARY EMBOLISM WITHOUT ACUTE COR PULMONALE (HCC): ICD-10-CM

## 2019-09-06 LAB — INR: 2.3 (ref 0.8–1.2)

## 2019-09-06 RX ORDER — WARFARIN SODIUM 5 MG/1
TABLET ORAL
Qty: 135 TABLET | Refills: 1 | Status: SHIPPED | OUTPATIENT
Start: 2019-09-06 | End: 2020-03-27

## 2019-09-08 LAB
ABSOLUTE BASOPHILS: 50 CELLS/UL (ref 0–200)
ABSOLUTE EOSINOPHILS: 231 CELLS/UL (ref 15–500)
ABSOLUTE LYMPHOCYTES: 2662 CELLS/UL (ref 850–3900)
ABSOLUTE MONOCYTES: 490 CELLS/UL (ref 200–950)
ABSOLUTE NEUTROPHILS: 2068 CELLS/UL (ref 1500–7800)
ALBUMIN/GLOBULIN RATIO: 1.6 (CALC) (ref 1–2.5)
ALBUMIN: 4.2 G/DL (ref 3.6–5.1)
ALKALINE PHOSPHATASE: 46 U/L (ref 40–115)
ALT: 22 U/L (ref 9–46)
AST: 22 U/L (ref 10–35)
BASOPHILS: 0.9 %
BILIRUBIN, TOTAL: 0.7 MG/DL (ref 0.2–1.2)
BUN: 15 MG/DL (ref 7–25)
CALCIUM: 9.6 MG/DL (ref 8.6–10.3)
CARBON DIOXIDE: 29 MMOL/L (ref 20–32)
CHLORIDE: 104 MMOL/L (ref 98–110)
CHOL/HDLC RATIO: 4.1 (CALC)
CHOLESTEROL, TOTAL: 192 MG/DL
CREATININE: 1.03 MG/DL (ref 0.7–1.25)
EGFR IF AFRICN AM: 91 ML/MIN/1.73M2
EGFR IF NONAFRICN AM: 79 ML/MIN/1.73M2
EOSINOPHILS: 4.2 %
GLOBULIN: 2.6 G/DL (CALC) (ref 1.9–3.7)
GLUCOSE: 91 MG/DL (ref 65–99)
HDL CHOLESTEROL: 47 MG/DL
HEMATOCRIT: 41.3 % (ref 38.5–50)
HEMOGLOBIN: 14.1 G/DL (ref 13.2–17.1)
LDL-CHOLESTEROL: 112 MG/DL (CALC)
LYMPHOCYTES: 48.4 %
MCH: 32.8 PG (ref 27–33)
MCHC: 34.1 G/DL (ref 32–36)
MCV: 96 FL (ref 80–100)
MONOCYTES: 8.9 %
MPV: 11.3 FL (ref 7.5–12.5)
NEUTROPHILS: 37.6 %
NON-HDL CHOLESTEROL: 145 MG/DL (CALC)
PLATELET COUNT: 169 THOUSAND/UL (ref 140–400)
POTASSIUM: 4.6 MMOL/L (ref 3.5–5.3)
PROTEIN, TOTAL: 6.8 G/DL (ref 6.1–8.1)
PSA, TOTAL: 0.5 NG/ML
RDW: 12.9 % (ref 11–15)
RED BLOOD CELL COUNT: 4.3 MILLION/UL (ref 4.2–5.8)
SODIUM: 140 MMOL/L (ref 135–146)
TRIGLYCERIDES: 213 MG/DL
TSH W/REFLEX TO FT4: 0.81 MIU/L (ref 0.4–4.5)
WHITE BLOOD CELL COUNT: 5.5 THOUSAND/UL (ref 3.8–10.8)

## 2019-09-11 DIAGNOSIS — E78.5 HYPERLIPIDEMIA, UNSPECIFIED HYPERLIPIDEMIA TYPE: Primary | ICD-10-CM

## 2019-09-21 ENCOUNTER — OFFICE VISIT (OUTPATIENT)
Dept: FAMILY MEDICINE CLINIC | Facility: CLINIC | Age: 59
End: 2019-09-21
Payer: COMMERCIAL

## 2019-09-21 VITALS
SYSTOLIC BLOOD PRESSURE: 122 MMHG | TEMPERATURE: 98 F | BODY MASS INDEX: 36.24 KG/M2 | RESPIRATION RATE: 16 BRPM | HEIGHT: 70.5 IN | WEIGHT: 256 LBS | HEART RATE: 52 BPM | DIASTOLIC BLOOD PRESSURE: 76 MMHG

## 2019-09-21 DIAGNOSIS — E78.2 ELEVATED CHOLESTEROL WITH HIGH TRIGLYCERIDES: ICD-10-CM

## 2019-09-21 DIAGNOSIS — Z86.711 HISTORY OF PULMONARY EMBOLISM: ICD-10-CM

## 2019-09-21 DIAGNOSIS — Z00.00 ANNUAL PHYSICAL EXAM: Primary | ICD-10-CM

## 2019-09-21 DIAGNOSIS — R73.01 IMPAIRED FASTING GLUCOSE: ICD-10-CM

## 2019-09-21 DIAGNOSIS — D69.6 THROMBOCYTOPENIA (HCC): ICD-10-CM

## 2019-09-21 DIAGNOSIS — I82.443 DEEP VEIN THROMBOSIS (DVT) OF TIBIAL VEIN OF BOTH LOWER EXTREMITIES, UNSPECIFIED CHRONICITY (HCC): ICD-10-CM

## 2019-09-21 PROBLEM — L72.3 SEBACEOUS CYST: Status: RESOLVED | Noted: 2018-09-14 | Resolved: 2019-09-21

## 2019-09-21 PROCEDURE — 99396 PREV VISIT EST AGE 40-64: CPT | Performed by: FAMILY MEDICINE

## 2019-09-21 NOTE — PROGRESS NOTES
Chief Complaint:   Patient presents with:  Physical    HPI:   This is a 59year old male presenting for annual physical.     Patient has history of TG-currently on lovaza with no side effects, last TG's stable.      Patient has a history of DVT and pulmonar Aloysius Klinefelter, MD at [facility] MAIN OR   • LUMBAR INTERLAMINAR EPIDURAL INJECTION N/A 7/24/2018    Performed by Aloysius Klinefelter, MD at [facility] MAIN OR   • LUMBAR INTERLAMINAR EPIDURAL INJECTION N/A 7/10/2018    Performed by Aloysius Klinefelter, MD at [facility] MAIN OR   • R REVIEW OF SYSTEMS:   Review of Systems   Constitutional: Negative for chills, diaphoresis, fatigue and fever. HENT: Negative for congestion, ear pain, facial swelling, postnasal drip, rhinorrhea, sinus pressure, sore throat and voice change.     Eyes: Normocephalic and atraumatic. Nose: Nose normal.   Mouth/Throat: Oropharynx is clear and moist. No oropharyngeal exudate or pharynx erythema. Eyes: Pupils are equal, round, and reactive to light.  Conjunctivae and EOM are normal. Right eye exhibits no d

## 2019-10-04 ENCOUNTER — ANTI-COAG VISIT (OUTPATIENT)
Dept: HEMATOLOGY/ONCOLOGY | Facility: HOSPITAL | Age: 59
End: 2019-10-04

## 2019-10-08 DIAGNOSIS — K21.00 REFLUX ESOPHAGITIS: ICD-10-CM

## 2019-10-08 DIAGNOSIS — E78.5 HYPERLIPIDEMIA, UNSPECIFIED HYPERLIPIDEMIA TYPE: ICD-10-CM

## 2019-10-09 RX ORDER — PANTOPRAZOLE SODIUM 40 MG/1
TABLET, DELAYED RELEASE ORAL
Qty: 90 TABLET | Refills: 1 | Status: SHIPPED | OUTPATIENT
Start: 2019-10-09 | End: 2020-05-26

## 2019-10-09 RX ORDER — OMEGA-3-ACID ETHYL ESTERS 1 G/1
CAPSULE, LIQUID FILLED ORAL
Qty: 360 CAPSULE | Refills: 1 | Status: SHIPPED | OUTPATIENT
Start: 2019-10-09 | End: 2020-03-29

## 2019-10-22 ENCOUNTER — ANTI-COAG VISIT (OUTPATIENT)
Dept: HEMATOLOGY/ONCOLOGY | Facility: HOSPITAL | Age: 59
End: 2019-10-22

## 2019-11-22 ENCOUNTER — ANTI-COAG VISIT (OUTPATIENT)
Dept: HEMATOLOGY/ONCOLOGY | Facility: HOSPITAL | Age: 59
End: 2019-11-22

## 2019-12-09 ENCOUNTER — ANTI-COAG VISIT (OUTPATIENT)
Dept: HEMATOLOGY/ONCOLOGY | Facility: HOSPITAL | Age: 59
End: 2019-12-09

## 2019-12-20 ENCOUNTER — ANTI-COAG VISIT (OUTPATIENT)
Dept: HEMATOLOGY/ONCOLOGY | Facility: HOSPITAL | Age: 59
End: 2019-12-20

## 2020-01-03 ENCOUNTER — ANTI-COAG VISIT (OUTPATIENT)
Dept: HEMATOLOGY/ONCOLOGY | Facility: HOSPITAL | Age: 60
End: 2020-01-03

## 2020-01-03 LAB — INR: 2.4 (ref 0.8–1.2)

## 2020-01-17 ENCOUNTER — ANTI-COAG VISIT (OUTPATIENT)
Dept: HEMATOLOGY/ONCOLOGY | Facility: HOSPITAL | Age: 60
End: 2020-01-17

## 2020-01-17 LAB — INR: 2.1 (ref 0.8–1.2)

## 2020-01-31 ENCOUNTER — ANTI-COAG VISIT (OUTPATIENT)
Dept: HEMATOLOGY/ONCOLOGY | Facility: HOSPITAL | Age: 60
End: 2020-01-31

## 2020-01-31 LAB — INR: 2.1 (ref 0.8–1.2)

## 2020-02-17 ENCOUNTER — ANTI-COAG VISIT (OUTPATIENT)
Dept: HEMATOLOGY/ONCOLOGY | Facility: HOSPITAL | Age: 60
End: 2020-02-17

## 2020-02-17 LAB — INR: 2.4 (ref 0.8–1.2)

## 2020-02-28 ENCOUNTER — ANTI-COAG VISIT (OUTPATIENT)
Dept: HEMATOLOGY/ONCOLOGY | Facility: HOSPITAL | Age: 60
End: 2020-02-28

## 2020-02-28 LAB — INR: 2.3 (ref 0.8–1.2)

## 2020-03-13 ENCOUNTER — ANTI-COAG VISIT (OUTPATIENT)
Dept: HEMATOLOGY/ONCOLOGY | Facility: HOSPITAL | Age: 60
End: 2020-03-13

## 2020-03-13 LAB — INR: 2.3 (ref 0.8–1.2)

## 2020-03-27 ENCOUNTER — ANTI-COAG VISIT (OUTPATIENT)
Dept: HEMATOLOGY/ONCOLOGY | Facility: HOSPITAL | Age: 60
End: 2020-03-27

## 2020-03-27 DIAGNOSIS — I26.99 OTHER PULMONARY EMBOLISM WITHOUT ACUTE COR PULMONALE (HCC): ICD-10-CM

## 2020-03-27 DIAGNOSIS — E78.5 HYPERLIPIDEMIA, UNSPECIFIED HYPERLIPIDEMIA TYPE: ICD-10-CM

## 2020-03-27 LAB — INR: 2.4 (ref 0.8–1.2)

## 2020-03-27 RX ORDER — WARFARIN SODIUM 5 MG/1
TABLET ORAL
Qty: 135 TABLET | Refills: 2 | Status: SHIPPED | OUTPATIENT
Start: 2020-03-27 | End: 2021-06-14

## 2020-03-29 RX ORDER — OMEGA-3-ACID ETHYL ESTERS 1 G/1
CAPSULE, LIQUID FILLED ORAL
Qty: 360 CAPSULE | Refills: 1 | Status: SHIPPED | OUTPATIENT
Start: 2020-03-29 | End: 2020-10-12

## 2020-04-27 ENCOUNTER — ANTI-COAG VISIT (OUTPATIENT)
Dept: HEMATOLOGY/ONCOLOGY | Facility: HOSPITAL | Age: 60
End: 2020-04-27

## 2020-05-11 ENCOUNTER — ANTI-COAG VISIT (OUTPATIENT)
Dept: HEMATOLOGY/ONCOLOGY | Facility: HOSPITAL | Age: 60
End: 2020-05-11

## 2020-05-22 DIAGNOSIS — K21.00 REFLUX ESOPHAGITIS: ICD-10-CM

## 2020-05-26 ENCOUNTER — ANTI-COAG VISIT (OUTPATIENT)
Dept: HEMATOLOGY/ONCOLOGY | Facility: HOSPITAL | Age: 60
End: 2020-05-26

## 2020-05-26 RX ORDER — PANTOPRAZOLE SODIUM 40 MG/1
TABLET, DELAYED RELEASE ORAL
Qty: 90 TABLET | Refills: 1 | OUTPATIENT
Start: 2020-05-26

## 2020-05-26 RX ORDER — PANTOPRAZOLE SODIUM 40 MG/1
40 TABLET, DELAYED RELEASE ORAL DAILY
Qty: 90 TABLET | Refills: 1 | Status: SHIPPED | OUTPATIENT
Start: 2020-05-26 | End: 2020-11-16

## 2020-05-26 NOTE — TELEPHONE ENCOUNTER
UYM:4/99/03  LF:10/9/19    Patient is overdue for OV. Medication denied at this time. Please assist patient with scheduling.

## 2020-05-26 NOTE — TELEPHONE ENCOUNTER
Patient called and states he is not available for an office visit until  has Saturday appointments, patient is requesting refill until then Please advise.

## 2020-05-28 ENCOUNTER — TELEPHONE (OUTPATIENT)
Dept: FAMILY MEDICINE CLINIC | Facility: CLINIC | Age: 60
End: 2020-05-28

## 2020-05-28 DIAGNOSIS — E78.2 ELEVATED CHOLESTEROL WITH HIGH TRIGLYCERIDES: Primary | ICD-10-CM

## 2020-05-28 NOTE — TELEPHONE ENCOUNTER
Lab orders placed for [facility] for patient as requested. Patient notified of this information via My Chart.

## 2020-05-28 NOTE — TELEPHONE ENCOUNTER
Patient scheduled an appointment with Dr. Libia Dunbar on 6/13 and would like to have his labs prior to the appointment. He goes to [facility], please orders there if necessary.  Thank you

## 2020-06-05 ENCOUNTER — TELEPHONE (OUTPATIENT)
Dept: HEMATOLOGY/ONCOLOGY | Facility: HOSPITAL | Age: 60
End: 2020-06-05

## 2020-06-08 ENCOUNTER — ANTI-COAG VISIT (OUTPATIENT)
Dept: HEMATOLOGY/ONCOLOGY | Facility: HOSPITAL | Age: 60
End: 2020-06-08

## 2020-06-09 ENCOUNTER — TELEPHONE (OUTPATIENT)
Dept: HEMATOLOGY/ONCOLOGY | Facility: HOSPITAL | Age: 60
End: 2020-06-09

## 2020-06-09 NOTE — TELEPHONE ENCOUNTER
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  He wanted to check the status o

## 2020-06-10 ENCOUNTER — SOCIAL WORK SERVICES (OUTPATIENT)
Dept: HEMATOLOGY/ONCOLOGY | Facility: HOSPITAL | Age: 60
End: 2020-06-10

## 2020-06-10 NOTE — PROGRESS NOTES
SW spoke with patient to obtain clarification on requested work paperwork. Patient states that the forms have been faxed multiple times. Upon review of chart, Sw discovered that a letter was previously written and asked patient if this would suffice.  Ame

## 2020-06-13 ENCOUNTER — OFFICE VISIT (OUTPATIENT)
Dept: FAMILY MEDICINE CLINIC | Facility: CLINIC | Age: 60
End: 2020-06-13
Payer: COMMERCIAL

## 2020-06-13 VITALS
HEIGHT: 70.5 IN | BODY MASS INDEX: 38.36 KG/M2 | DIASTOLIC BLOOD PRESSURE: 78 MMHG | SYSTOLIC BLOOD PRESSURE: 128 MMHG | RESPIRATION RATE: 16 BRPM | TEMPERATURE: 98 F | HEART RATE: 50 BPM | WEIGHT: 271 LBS | OXYGEN SATURATION: 98 %

## 2020-06-13 DIAGNOSIS — I82.443 DEEP VEIN THROMBOSIS (DVT) OF TIBIAL VEIN OF BOTH LOWER EXTREMITIES, UNSPECIFIED CHRONICITY (HCC): ICD-10-CM

## 2020-06-13 DIAGNOSIS — M54.16 LUMBAR RADICULITIS: ICD-10-CM

## 2020-06-13 DIAGNOSIS — M46.1 SACROILIITIS (HCC): Primary | ICD-10-CM

## 2020-06-13 DIAGNOSIS — K21.9 GASTROESOPHAGEAL REFLUX DISEASE WITHOUT ESOPHAGITIS: ICD-10-CM

## 2020-06-13 DIAGNOSIS — E78.2 MIXED HYPERLIPIDEMIA: ICD-10-CM

## 2020-06-13 PROCEDURE — 99214 OFFICE O/P EST MOD 30 MIN: CPT | Performed by: FAMILY MEDICINE

## 2020-06-13 NOTE — PROGRESS NOTES
Chief Complaint:   Patient presents with:  Medication Follow-Up    HPI:   This is a 62year old male presenting for follow up, he has gained 20 lbs since last visit. Patient has history of TG-currently on lovaza with no side effects, last TG's stable. EPIDURAL INJECTION N/A 8/7/2018    Performed by Amaury Ramirez MD at [facility] MAIN OR   • LUMBAR INTERLAMINAR EPIDURAL INJECTION N/A 7/24/2018    Performed by Amaury Ramirez MD at [facility] MAIN OR   • LUMBAR INTERLAMINAR EPIDURAL INJECTION N/A 7/10/2018    Perfo Counseling given: Not Answered       REVIEW OF SYSTEMS:   Review of Systems   Constitutional: Negative for chills, diaphoresis, fatigue and fever.    HENT: Negative for congestion, ear pain, facial swelling, postnasal drip, rhinorrhea, sinus pressure, sor appears well-developed and well-nourished. No distress. HENT:   Head: Normocephalic and atraumatic. Nose: Nose normal.   Mouth/Throat: Oropharynx is clear and moist. No oropharyngeal exudate or pharynx erythema.    Eyes: Pupils are equal, round, and javad Patient/Caregiver provided printed discharge information.

## 2020-06-22 ENCOUNTER — ANTI-COAG VISIT (OUTPATIENT)
Dept: HEMATOLOGY/ONCOLOGY | Facility: HOSPITAL | Age: 60
End: 2020-06-22

## 2020-07-06 ENCOUNTER — ANTI-COAG VISIT (OUTPATIENT)
Dept: HEMATOLOGY/ONCOLOGY | Facility: HOSPITAL | Age: 60
End: 2020-07-06

## 2020-07-06 LAB — INR: 2.5 (ref 0.8–1.2)

## 2020-07-20 ENCOUNTER — ANTI-COAG VISIT (OUTPATIENT)
Dept: HEMATOLOGY/ONCOLOGY | Facility: HOSPITAL | Age: 60
End: 2020-07-20

## 2020-07-20 LAB — INR: 2.9 (ref 0.8–1.2)

## 2020-08-03 ENCOUNTER — ANTI-COAG VISIT (OUTPATIENT)
Dept: HEMATOLOGY/ONCOLOGY | Facility: HOSPITAL | Age: 60
End: 2020-08-03

## 2020-08-03 LAB — INR: 2.9 (ref 0.8–1.2)

## 2020-08-17 ENCOUNTER — ANTI-COAG VISIT (OUTPATIENT)
Dept: HEMATOLOGY/ONCOLOGY | Facility: HOSPITAL | Age: 60
End: 2020-08-17

## 2020-08-17 LAB — INR: 2.6 (ref 0.8–1.2)

## 2020-08-31 ENCOUNTER — ANTI-COAG VISIT (OUTPATIENT)
Dept: HEMATOLOGY/ONCOLOGY | Facility: HOSPITAL | Age: 60
End: 2020-08-31

## 2020-08-31 LAB — INR: 2.5 (ref 0.8–1.2)

## 2020-09-14 ENCOUNTER — ANTI-COAG VISIT (OUTPATIENT)
Dept: HEMATOLOGY/ONCOLOGY | Facility: HOSPITAL | Age: 60
End: 2020-09-14

## 2020-09-14 LAB — INR: 2.6 (ref 0.8–1.2)

## 2020-09-16 ENCOUNTER — TELEPHONE (OUTPATIENT)
Dept: HEMATOLOGY/ONCOLOGY | Facility: HOSPITAL | Age: 60
End: 2020-09-16

## 2020-09-16 NOTE — TELEPHONE ENCOUNTER
Vaibhav Taylor called saying he is applying for life insurance, and would like to know the date of his last blood clot.  Please call

## 2020-09-28 ENCOUNTER — ANTI-COAG VISIT (OUTPATIENT)
Dept: HEMATOLOGY/ONCOLOGY | Facility: HOSPITAL | Age: 60
End: 2020-09-28

## 2020-09-28 LAB — INR: 2.4 (ref 0.8–1.2)

## 2020-10-10 DIAGNOSIS — E78.5 HYPERLIPIDEMIA, UNSPECIFIED HYPERLIPIDEMIA TYPE: ICD-10-CM

## 2020-10-12 ENCOUNTER — ANTI-COAG VISIT (OUTPATIENT)
Dept: HEMATOLOGY/ONCOLOGY | Facility: HOSPITAL | Age: 60
End: 2020-10-12

## 2020-10-12 RX ORDER — OMEGA-3-ACID ETHYL ESTERS 1 G/1
CAPSULE, LIQUID FILLED ORAL
Qty: 360 CAPSULE | Refills: 1 | Status: SHIPPED | OUTPATIENT
Start: 2020-10-12 | End: 2021-04-13

## 2020-10-12 NOTE — TELEPHONE ENCOUNTER
Medication(s) to Refill:   Requested Prescriptions     Pending Prescriptions Disp Refills   • Omega-3-acid Ethyl Esters 1 g Oral Cap [Pharmacy Med Name: OMEGA-3-ACID 1GM CAPSULES (RX)] 360 capsule 1     Sig: TAKE 2 CAPSULES BY MOUTH TWICE DAILY GENERIC EQU

## 2020-10-19 ENCOUNTER — TELEPHONE (OUTPATIENT)
Dept: HEMATOLOGY/ONCOLOGY | Facility: HOSPITAL | Age: 60
End: 2020-10-19

## 2020-10-19 ENCOUNTER — TELEPHONE (OUTPATIENT)
Dept: HEMATOLOGY/ONCOLOGY | Age: 60
End: 2020-10-19

## 2020-10-19 NOTE — TELEPHONE ENCOUNTER
Amberly Dougherty called saying he is applying for life insurance, and would like to know the date of his last blood clot.  Please call, this is his 2nd call and the insurance company has called a few times as well and still waiting for an answer
Attending Only

## 2020-10-21 ENCOUNTER — SOCIAL WORK SERVICES (OUTPATIENT)
Dept: HEMATOLOGY/ONCOLOGY | Facility: HOSPITAL | Age: 60
End: 2020-10-21

## 2020-10-21 ENCOUNTER — TELEPHONE (OUTPATIENT)
Dept: HEMATOLOGY/ONCOLOGY | Age: 60
End: 2020-10-21

## 2020-10-21 NOTE — TELEPHONE ENCOUNTER
Junior Ojeda is calling to follow up on a medical record request sent to [facility] and Junior Ojeda would like a call back.

## 2020-10-21 NOTE — PROGRESS NOTES
faxed requested Documentation from 2017 to present (Anti-Coag Encounters 1st and most recent included only) to [employer] at H#[phone]

## 2020-10-26 ENCOUNTER — ANTI-COAG VISIT (OUTPATIENT)
Dept: HEMATOLOGY/ONCOLOGY | Facility: HOSPITAL | Age: 60
End: 2020-10-26

## 2020-10-27 ENCOUNTER — TELEPHONE (OUTPATIENT)
Dept: HEMATOLOGY/ONCOLOGY | Age: 60
End: 2020-10-27

## 2020-10-27 NOTE — TELEPHONE ENCOUNTER
Ji Hester at [phone] patient cut himself while shaving, his nose there's a little nick and it will not stop bleeding, Dr. Sylvie Brand pt.

## 2020-10-27 NOTE — TELEPHONE ENCOUNTER
Patient of Dr Haily Joshi - Hx of PE/DVT on warfarin    10/26/2020  INR=2.5   Patient took 5mg of warfarin last night. Bleeding: (Patient was shaving this morning and nicked his nose. He has been applying pressure for 50 minutes.  When he releases pressure i

## 2020-11-09 ENCOUNTER — ANTI-COAG VISIT (OUTPATIENT)
Dept: HEMATOLOGY/ONCOLOGY | Facility: HOSPITAL | Age: 60
End: 2020-11-09

## 2020-11-15 DIAGNOSIS — K21.00 REFLUX ESOPHAGITIS: ICD-10-CM

## 2020-11-16 RX ORDER — PANTOPRAZOLE SODIUM 40 MG/1
TABLET, DELAYED RELEASE ORAL
Qty: 90 TABLET | Refills: 1 | Status: SHIPPED | OUTPATIENT
Start: 2020-11-16 | End: 2021-05-19

## 2020-11-23 ENCOUNTER — ANTI-COAG VISIT (OUTPATIENT)
Dept: HEMATOLOGY/ONCOLOGY | Facility: HOSPITAL | Age: 60
End: 2020-11-23

## 2020-12-07 ENCOUNTER — ANTI-COAG VISIT (OUTPATIENT)
Dept: HEMATOLOGY/ONCOLOGY | Facility: HOSPITAL | Age: 60
End: 2020-12-07

## 2020-12-21 ENCOUNTER — ANTI-COAG VISIT (OUTPATIENT)
Dept: HEMATOLOGY/ONCOLOGY | Facility: HOSPITAL | Age: 60
End: 2020-12-21

## 2020-12-22 ENCOUNTER — TELEPHONE (OUTPATIENT)
Dept: FAMILY MEDICINE CLINIC | Facility: CLINIC | Age: 60
End: 2020-12-22

## 2020-12-22 NOTE — TELEPHONE ENCOUNTER
Called and spoke with Lizzette Triplett at [facility] pharmacy who states that the Omega-3-acid Ethyl Esters 1 g Oral Cap is covered with only a $20 out of pocket cost to the patient.  Rene Constantino states that it is possible that the patient requested a refil

## 2020-12-22 NOTE — TELEPHONE ENCOUNTER
Patients wife called, the following is not covered by insurance. .    Omega-3-acid Ethyl Esters 1 g Oral Cap    Patient would like to know if there is another medication, but not a statin. Please Advise. Thank you.

## 2021-01-04 ENCOUNTER — ANTI-COAG VISIT (OUTPATIENT)
Dept: HEMATOLOGY/ONCOLOGY | Facility: HOSPITAL | Age: 61
End: 2021-01-04

## 2021-01-04 LAB — INR: 2.9 (ref 0.8–1.2)

## 2021-01-18 ENCOUNTER — ANTI-COAG VISIT (OUTPATIENT)
Dept: HEMATOLOGY/ONCOLOGY | Facility: HOSPITAL | Age: 61
End: 2021-01-18

## 2021-01-18 LAB — INR: 2.4 (ref 0.8–1.2)

## 2021-02-01 ENCOUNTER — ANTI-COAG VISIT (OUTPATIENT)
Dept: HEMATOLOGY/ONCOLOGY | Facility: HOSPITAL | Age: 61
End: 2021-02-01

## 2021-02-01 LAB — INR: 2.1 (ref 0.8–1.2)

## 2021-02-14 LAB — INR: 2.4 (ref 0.8–1.2)

## 2021-02-15 ENCOUNTER — ANTI-COAG VISIT (OUTPATIENT)
Dept: HEMATOLOGY/ONCOLOGY | Facility: HOSPITAL | Age: 61
End: 2021-02-15

## 2021-03-01 ENCOUNTER — ANTI-COAG VISIT (OUTPATIENT)
Dept: HEMATOLOGY/ONCOLOGY | Facility: HOSPITAL | Age: 61
End: 2021-03-01

## 2021-03-01 LAB — INR: 1.9 (ref 0.8–1.2)

## 2021-03-15 ENCOUNTER — ANTI-COAG VISIT (OUTPATIENT)
Dept: HEMATOLOGY/ONCOLOGY | Facility: HOSPITAL | Age: 61
End: 2021-03-15

## 2021-03-15 LAB — INR: 2.1 (ref 0.8–1.2)

## 2021-03-29 ENCOUNTER — ANTI-COAG VISIT (OUTPATIENT)
Dept: HEMATOLOGY/ONCOLOGY | Facility: HOSPITAL | Age: 61
End: 2021-03-29

## 2021-03-29 LAB — INR: 2.5 (ref 0.8–1.2)

## 2021-04-05 ENCOUNTER — TELEPHONE (OUTPATIENT)
Dept: FAMILY MEDICINE CLINIC | Facility: CLINIC | Age: 61
End: 2021-04-05

## 2021-04-05 NOTE — TELEPHONE ENCOUNTER
Patient's wife called regarding    Omega-3-acid Ethyl Esters 1 g Oral Cap      She states the insurance will not cover this medication anymore, but she was told to call our office when patient was down to last bottle and someone would call insurance to get

## 2021-04-08 NOTE — TELEPHONE ENCOUNTER
Received call from [employer] regarding PA that was started. They are asking for additional clinical information. Faxed office notes. Received confirmation. Will await response.

## 2021-04-09 NOTE — TELEPHONE ENCOUNTER
PA denied for Omega-3-acid Ethyl Esters 1 g Oral Cap. Patient must try two alternatives including OTC and have inadequate response before being covered. Please advise. Thank you.

## 2021-04-12 ENCOUNTER — ANTI-COAG VISIT (OUTPATIENT)
Dept: HEMATOLOGY/ONCOLOGY | Facility: HOSPITAL | Age: 61
End: 2021-04-12

## 2021-04-13 RX ORDER — GEMFIBROZIL 600 MG/1
600 TABLET, FILM COATED ORAL
Qty: 60 TABLET | Refills: 3 | Status: SHIPPED | OUTPATIENT
Start: 2021-04-13 | End: 2021-05-19

## 2021-04-26 ENCOUNTER — ANTI-COAG VISIT (OUTPATIENT)
Dept: HEMATOLOGY/ONCOLOGY | Facility: HOSPITAL | Age: 61
End: 2021-04-26

## 2021-04-28 NOTE — TELEPHONE ENCOUNTER
Pt has physical scheduled for 08/31 and wants to get labs done in advance, but he gets labs done at [facility]. Only Lipid Panel in system currently, Pt's wife (OK per HIPAA) will  orders to take to [facility] when ready. Call wife Sonam Viviterri on cell phone. Melolabial Transposition Flap Text: The defect edges were debeveled with a #15 scalpel blade.  Given the location of the defect and the proximity to free margins a melolabial flap was deemed most appropriate.  Using a sterile surgical marker, an appropriate melolabial transposition flap was drawn incorporating the defect.    The area thus outlined was incised deep to adipose tissue with a #15 scalpel blade.  The skin margins were undermined to an appropriate distance in all directions utilizing iris scissors.

## 2021-05-10 ENCOUNTER — ANTI-COAG VISIT (OUTPATIENT)
Dept: HEMATOLOGY/ONCOLOGY | Facility: HOSPITAL | Age: 61
End: 2021-05-10

## 2021-05-10 ENCOUNTER — TELEPHONE (OUTPATIENT)
Dept: HEMATOLOGY/ONCOLOGY | Facility: HOSPITAL | Age: 61
End: 2021-05-10

## 2021-05-10 NOTE — TELEPHONE ENCOUNTER
Hoang Haddad calling says his INR on sat was 4.8 spoke with on call dr told him to stop for two days.  He took INR this morning was 2.6. call between [phone] thank you jerome

## 2021-05-17 ENCOUNTER — ANTI-COAG VISIT (OUTPATIENT)
Dept: HEMATOLOGY/ONCOLOGY | Facility: HOSPITAL | Age: 61
End: 2021-05-17

## 2021-05-19 ENCOUNTER — TELEPHONE (OUTPATIENT)
Dept: FAMILY MEDICINE CLINIC | Facility: CLINIC | Age: 61
End: 2021-05-19

## 2021-05-19 DIAGNOSIS — K21.00 REFLUX ESOPHAGITIS: ICD-10-CM

## 2021-05-19 RX ORDER — PANTOPRAZOLE SODIUM 40 MG/1
40 TABLET, DELAYED RELEASE ORAL DAILY
Qty: 90 TABLET | Refills: 1 | Status: SHIPPED | OUTPATIENT
Start: 2021-05-19 | End: 2021-11-14

## 2021-05-19 RX ORDER — GEMFIBROZIL 600 MG/1
600 TABLET, FILM COATED ORAL
Qty: 180 TABLET | Refills: 0 | Status: SHIPPED | OUTPATIENT
Start: 2021-05-19 | End: 2021-10-04

## 2021-05-19 NOTE — TELEPHONE ENCOUNTER
Patient is needing for the refill of gemfibrozil (LOPID) 600 MG Oral Tab to be sent to [facility] and not the local [place] [address].  Thank you

## 2021-05-24 NOTE — PROGRESS NOTES
Chief Complaint:   Patient presents with:  Medication Follow-Up    HPI:   This is a 59year old male presenting for follow up. Patient has history of TG-currently on lovaza with no side effects, last TG's stable.      Patient has a history of DVT and p Performed by Idania Birch MD at [address] [place] [address]   • LUMBAR INTERLAMINAR EPIDURAL INJECTION N/A 7/24/2018    Performed by Idania Birch MD at [facility] MAIN OR   • LUMBAR INTERLAMINAR EPIDURAL INJECTION N/A 7/10/2018    Performed by Idania Birch MD at [facility] REVIEW OF SYSTEMS:   Review of Systems   Constitutional: Negative for chills, diaphoresis, fatigue and fever. HENT: Negative for congestion, ear pain, facial swelling, postnasal drip, rhinorrhea, sinus pressure, sore throat and voice change.     Eyes: Normocephalic and atraumatic. Nose: Nose normal.   Mouth/Throat: Oropharynx is clear and moist. No oropharyngeal exudate or pharynx erythema. Eyes: Pupils are equal, round, and reactive to light.  Conjunctivae and EOM are normal. Right eye exhibits no d Quality 402: Tobacco Use And Help With Quitting Among Adolescents: Patient screened for tobacco and never smoked Detail Level: Detailed

## 2021-05-28 ENCOUNTER — TELEPHONE (OUTPATIENT)
Dept: HEMATOLOGY/ONCOLOGY | Facility: HOSPITAL | Age: 61
End: 2021-05-28

## 2021-05-28 NOTE — TELEPHONE ENCOUNTER
Irish Woods calling he is faxing over forms for  to fill out stating he's on warfarin. Irish Woods gives his consent to  to fax back to company.  Thank you jerome

## 2021-06-02 ENCOUNTER — TELEPHONE (OUTPATIENT)
Dept: HEMATOLOGY/ONCOLOGY | Facility: HOSPITAL | Age: 61
End: 2021-06-02

## 2021-06-02 ENCOUNTER — TELEPHONE (OUTPATIENT)
Dept: FAMILY MEDICINE CLINIC | Facility: CLINIC | Age: 61
End: 2021-06-02

## 2021-06-02 ENCOUNTER — ANTI-COAG VISIT (OUTPATIENT)
Dept: HEMATOLOGY/ONCOLOGY | Facility: HOSPITAL | Age: 61
End: 2021-06-02

## 2021-06-02 DIAGNOSIS — Z13.29 SCREENING FOR ENDOCRINE, NUTRITIONAL, METABOLIC AND IMMUNITY DISORDER: ICD-10-CM

## 2021-06-02 DIAGNOSIS — Z13.0 SCREENING FOR ENDOCRINE, NUTRITIONAL, METABOLIC AND IMMUNITY DISORDER: ICD-10-CM

## 2021-06-02 DIAGNOSIS — Z12.5 SCREENING FOR PROSTATE CANCER: ICD-10-CM

## 2021-06-02 DIAGNOSIS — Z13.228 SCREENING FOR ENDOCRINE, NUTRITIONAL, METABOLIC AND IMMUNITY DISORDER: ICD-10-CM

## 2021-06-02 DIAGNOSIS — D69.6 THROMBOCYTOPENIA (HCC): ICD-10-CM

## 2021-06-02 DIAGNOSIS — E78.2 MIXED HYPERLIPIDEMIA: ICD-10-CM

## 2021-06-02 DIAGNOSIS — Z13.21 SCREENING FOR ENDOCRINE, NUTRITIONAL, METABOLIC AND IMMUNITY DISORDER: ICD-10-CM

## 2021-06-02 DIAGNOSIS — E78.2 ELEVATED CHOLESTEROL WITH HIGH TRIGLYCERIDES: Primary | ICD-10-CM

## 2021-06-02 NOTE — TELEPHONE ENCOUNTER
Bebe Warren returning call asking to return call between 930-10 am he should be on lunch at that time.  Thank you jerome

## 2021-06-02 NOTE — TELEPHONE ENCOUNTER
Patient is scheduled for annual physical on 6/12/21 and would like to have his labs done prior to it. Please send orders to [facility] and let Pt know via My Chart if/when orders are in.  Thank you

## 2021-06-09 ENCOUNTER — ANTI-COAG VISIT (OUTPATIENT)
Dept: HEMATOLOGY/ONCOLOGY | Facility: HOSPITAL | Age: 61
End: 2021-06-09

## 2021-06-12 ENCOUNTER — OFFICE VISIT (OUTPATIENT)
Dept: FAMILY MEDICINE CLINIC | Facility: CLINIC | Age: 61
End: 2021-06-12
Payer: COMMERCIAL

## 2021-06-12 VITALS
BODY MASS INDEX: 34.97 KG/M2 | SYSTOLIC BLOOD PRESSURE: 134 MMHG | HEIGHT: 70.5 IN | RESPIRATION RATE: 16 BRPM | OXYGEN SATURATION: 97 % | WEIGHT: 247 LBS | HEART RATE: 60 BPM | DIASTOLIC BLOOD PRESSURE: 80 MMHG | TEMPERATURE: 98 F

## 2021-06-12 DIAGNOSIS — I82.443 DEEP VEIN THROMBOSIS (DVT) OF TIBIAL VEIN OF BOTH LOWER EXTREMITIES, UNSPECIFIED CHRONICITY (HCC): ICD-10-CM

## 2021-06-12 DIAGNOSIS — I26.99 OTHER PULMONARY EMBOLISM WITHOUT ACUTE COR PULMONALE (HCC): ICD-10-CM

## 2021-06-12 DIAGNOSIS — Z12.83 SKIN CANCER SCREENING: ICD-10-CM

## 2021-06-12 DIAGNOSIS — D69.6 THROMBOCYTOPENIA (HCC): ICD-10-CM

## 2021-06-12 DIAGNOSIS — M46.1 SACROILIITIS (HCC): ICD-10-CM

## 2021-06-12 DIAGNOSIS — Z00.00 ANNUAL PHYSICAL EXAM: Primary | ICD-10-CM

## 2021-06-12 PROCEDURE — 99396 PREV VISIT EST AGE 40-64: CPT | Performed by: FAMILY MEDICINE

## 2021-06-12 PROCEDURE — 3079F DIAST BP 80-89 MM HG: CPT | Performed by: FAMILY MEDICINE

## 2021-06-12 PROCEDURE — 3008F BODY MASS INDEX DOCD: CPT | Performed by: FAMILY MEDICINE

## 2021-06-12 PROCEDURE — 3075F SYST BP GE 130 - 139MM HG: CPT | Performed by: FAMILY MEDICINE

## 2021-06-12 NOTE — PROGRESS NOTES
Chief Complaint:   Patient presents with:  Physical    HPI:   This is a 56year old male presenting for follow up, he has lost 30 lbs since last visit. Patient has history of TG-currently on lovaza with no side effects, last TG's stable.      Patient ha Comment: NEVER    Drug use: No    Family History:  Family History   Problem Relation Age of Onset   • Other (colon cancer) Father    • Other (diabetes mellitus) Father    • Cancer Father    • Diabetes Father    • Hypertension Mother      Allergies:    Tracey Vazquez environmental allergies, food allergies and immunocompromised state. Neurological: Negative for dizziness, weakness, light-headedness and headaches. Hematological: Negative for adenopathy. Does not bruise/bleed easily.    Psychiatric/Behavioral: Siddhartha Rios and time. No cranial nerve deficit or motor deficit. Gait normal.   Skin: Skin is warm and dry. No lesion and no rash noted. He is not diaphoretic. Psychiatric: He has a normal mood and affect.  His behavior is normal. Judgment and thought content normal.

## 2021-06-14 RX ORDER — WARFARIN SODIUM 5 MG/1
TABLET ORAL
Qty: 135 TABLET | Refills: 2 | Status: SHIPPED | OUTPATIENT
Start: 2021-06-14 | End: 2021-08-17

## 2021-06-16 ENCOUNTER — ANTI-COAG VISIT (OUTPATIENT)
Dept: HEMATOLOGY/ONCOLOGY | Facility: HOSPITAL | Age: 61
End: 2021-06-16

## 2021-06-30 ENCOUNTER — ANTI-COAG VISIT (OUTPATIENT)
Dept: HEMATOLOGY/ONCOLOGY | Facility: HOSPITAL | Age: 61
End: 2021-06-30

## 2021-07-14 ENCOUNTER — ANTI-COAG VISIT (OUTPATIENT)
Dept: HEMATOLOGY/ONCOLOGY | Facility: HOSPITAL | Age: 61
End: 2021-07-14

## 2021-07-14 LAB — INR: 2.2 (ref 0.8–1.2)

## 2021-07-28 LAB — INR: 2.5 (ref 0.8–1.2)

## 2021-07-29 ENCOUNTER — ANTI-COAG VISIT (OUTPATIENT)
Dept: HEMATOLOGY/ONCOLOGY | Facility: HOSPITAL | Age: 61
End: 2021-07-29

## 2021-08-11 ENCOUNTER — ANTI-COAG VISIT (OUTPATIENT)
Dept: HEMATOLOGY/ONCOLOGY | Facility: HOSPITAL | Age: 61
End: 2021-08-11

## 2021-08-11 LAB — INR: 2.3 (ref 0.8–1.2)

## 2021-08-16 DIAGNOSIS — I26.99 OTHER PULMONARY EMBOLISM WITHOUT ACUTE COR PULMONALE (HCC): ICD-10-CM

## 2021-08-17 RX ORDER — WARFARIN SODIUM 5 MG/1
TABLET ORAL
Qty: 135 TABLET | Refills: 2 | Status: SHIPPED | OUTPATIENT
Start: 2021-08-17 | End: 2023-03-02

## 2021-08-18 ENCOUNTER — TELEPHONE (OUTPATIENT)
Dept: HEMATOLOGY/ONCOLOGY | Age: 61
End: 2021-08-18

## 2021-08-18 NOTE — TELEPHONE ENCOUNTER
[facility] Rod[facility]guez [facility]  ----- Message -----   From: Easton Leekeeper, RN   Sent: 8/17/2021   3:22 PM CDT   To: [facility]fusion Scheduling     Can you call pt, no rush and have him set up MD f/u.  It's been 5

## 2021-08-28 ENCOUNTER — HOSPITAL ENCOUNTER (EMERGENCY)
Age: 61
Discharge: HOME | End: 2021-08-28
Attending: EMERGENCY MEDICINE
Payer: COMMERCIAL

## 2021-08-28 VITALS
TEMPERATURE: 98 F | RESPIRATION RATE: 18 BRPM | SYSTOLIC BLOOD PRESSURE: 137 MMHG | DIASTOLIC BLOOD PRESSURE: 74 MMHG | OXYGEN SATURATION: 96 % | WEIGHT: 252 LBS | HEART RATE: 71 BPM | BODY MASS INDEX: 36.08 KG/M2 | HEIGHT: 70 IN

## 2021-08-28 DIAGNOSIS — L02.91 ABSCESS: Primary | ICD-10-CM

## 2021-08-28 PROCEDURE — 87070 CULTURE OTHR SPECIMN AEROBIC: CPT | Performed by: EMERGENCY MEDICINE

## 2021-08-28 PROCEDURE — 87077 CULTURE AEROBIC IDENTIFY: CPT | Performed by: EMERGENCY MEDICINE

## 2021-08-28 PROCEDURE — 99283 EMERGENCY DEPT VISIT LOW MDM: CPT

## 2021-08-28 PROCEDURE — 87205 SMEAR GRAM STAIN: CPT | Performed by: EMERGENCY MEDICINE

## 2021-08-28 RX ORDER — CEPHALEXIN 500 MG/1
500 CAPSULE ORAL 4 TIMES DAILY
Qty: 40 CAPSULE | Refills: 0 | Status: SHIPPED | OUTPATIENT
Start: 2021-08-28 | End: 2021-09-07

## 2021-08-29 NOTE — ED INITIAL ASSESSMENT (HPI)
Pt here for cyst/abscess to back for about one week with increased redness/swelling for the past couple days, yesterday it ruptured with pus and blood, pt states area has been bleeding since dressing change today, pt on coumadin.

## 2021-08-29 NOTE — ED PROVIDER NOTES
Patient Seen in: THE [facility] In [place]      History   Patient presents with:  Abscess  Bleeding    Stated Complaint: abscess to back that ruptured and wont stop bleeding on coumadin    HPI/Subjective:   HPI    Patient 56-year-old gent Device        Current:/74   Pulse 71   Temp 97.9 °F (36.6 °C) (Temporal)   Resp 18   Ht 177.8 cm (5' 10\")   Wt 114.3 kg   SpO2 96%   BMI 36.16 kg/m²         Physical Exam  Vitals and nursing note reviewed.    Constitutional:       General: He is not days  Return to the ER if you feel worse in any way, Return to the ER if you have any concerns          Medications Prescribed:  Current Discharge Medication List    START taking these medications    cephalexin 500 MG Oral Cap  Take 1 capsule (500 mg total

## 2021-08-31 ENCOUNTER — OFFICE VISIT (OUTPATIENT)
Dept: HEMATOLOGY/ONCOLOGY | Age: 61
End: 2021-08-31
Attending: INTERNAL MEDICINE
Payer: COMMERCIAL

## 2021-08-31 ENCOUNTER — OFFICE VISIT (OUTPATIENT)
Dept: FAMILY MEDICINE CLINIC | Facility: CLINIC | Age: 61
End: 2021-08-31
Payer: COMMERCIAL

## 2021-08-31 VITALS
OXYGEN SATURATION: 98 % | HEIGHT: 70 IN | WEIGHT: 259 LBS | SYSTOLIC BLOOD PRESSURE: 114 MMHG | DIASTOLIC BLOOD PRESSURE: 66 MMHG | BODY MASS INDEX: 37.08 KG/M2 | HEART RATE: 62 BPM | RESPIRATION RATE: 16 BRPM

## 2021-08-31 VITALS
OXYGEN SATURATION: 99 % | SYSTOLIC BLOOD PRESSURE: 136 MMHG | HEART RATE: 72 BPM | RESPIRATION RATE: 18 BRPM | TEMPERATURE: 99 F | WEIGHT: 259 LBS | DIASTOLIC BLOOD PRESSURE: 77 MMHG | BODY MASS INDEX: 37 KG/M2

## 2021-08-31 DIAGNOSIS — L08.9 INFECTED EPIDERMOID CYST: ICD-10-CM

## 2021-08-31 DIAGNOSIS — L72.0 INFECTED EPIDERMOID CYST: ICD-10-CM

## 2021-08-31 DIAGNOSIS — L02.91 ABSCESS: Primary | ICD-10-CM

## 2021-08-31 DIAGNOSIS — Z79.01 CURRENT USE OF LONG TERM ANTICOAGULATION: ICD-10-CM

## 2021-08-31 DIAGNOSIS — B95.8 STAPH INFECTION: ICD-10-CM

## 2021-08-31 DIAGNOSIS — I82.90 VTE (VENOUS THROMBOEMBOLISM): Primary | ICD-10-CM

## 2021-08-31 PROCEDURE — 3074F SYST BP LT 130 MM HG: CPT | Performed by: FAMILY MEDICINE

## 2021-08-31 PROCEDURE — 3008F BODY MASS INDEX DOCD: CPT | Performed by: FAMILY MEDICINE

## 2021-08-31 PROCEDURE — 3078F DIAST BP <80 MM HG: CPT | Performed by: FAMILY MEDICINE

## 2021-08-31 PROCEDURE — 99214 OFFICE O/P EST MOD 30 MIN: CPT | Performed by: FAMILY MEDICINE

## 2021-08-31 PROCEDURE — 99214 OFFICE O/P EST MOD 30 MIN: CPT | Performed by: INTERNAL MEDICINE

## 2021-08-31 NOTE — PROGRESS NOTES
Pt here for 5 year MD f/u. Energy level and appetite are good. Denies pain. Pt continues on COumadin. Pt has no further complaints.      Outpatient Oncology Care Plan  Problem list:  fatigue  knowledge deficit    Problems related to:    disease/disease prog

## 2021-08-31 NOTE — PROGRESS NOTES
Tari Green is a 56year old male who presents for Patient presents with:  Abscess: pt seen in ED on 8/28/2021.  pt states abscess drained     HPI:   Patient's presenting for follow up from Hospital     Patient was in Hospital/ER/WIC: date(s) 8/28/21 Past Medical History:   Diagnosis Date   • Arthritis    • Bronchitis, not specified as acute or chronic    • Cough    • Current use of long term anticoagulation 9/14/2018   • DVT (deep venous thrombosis) ([facility] [place] [facility] 11/2015   • Gastric reflux    • Hernia of deviation. Cardiovascular:      Rate and Rhythm: Normal rate and regular rhythm. Heart sounds: Normal heart sounds. No murmur heard. No friction rub. No gallop. Pulmonary:      Effort: Pulmonary effort is normal. No respiratory distress.       B

## 2021-09-09 ENCOUNTER — ANTI-COAG VISIT (OUTPATIENT)
Dept: HEMATOLOGY/ONCOLOGY | Facility: HOSPITAL | Age: 61
End: 2021-09-09

## 2021-09-09 LAB — INR: 2.5 (ref 0.8–1.2)

## 2021-09-10 ENCOUNTER — HOSPITAL ENCOUNTER (EMERGENCY)
Age: 61
Discharge: HOME | End: 2021-09-10
Attending: EMERGENCY MEDICINE
Payer: COMMERCIAL

## 2021-09-10 VITALS
HEIGHT: 70 IN | BODY MASS INDEX: 36.51 KG/M2 | TEMPERATURE: 97 F | SYSTOLIC BLOOD PRESSURE: 106 MMHG | WEIGHT: 255 LBS | DIASTOLIC BLOOD PRESSURE: 62 MMHG | OXYGEN SATURATION: 98 % | RESPIRATION RATE: 16 BRPM | HEART RATE: 62 BPM

## 2021-09-10 DIAGNOSIS — R55 SYNCOPE, UNSPECIFIED SYNCOPE TYPE: Primary | ICD-10-CM

## 2021-09-10 LAB
ALBUMIN SERPL-MCNC: 3.7 G/DL (ref 3.4–5)
ALBUMIN/GLOB SERPL: 1 {RATIO} (ref 1–2)
ALP LIVER SERPL-CCNC: 43 U/L
ALT SERPL-CCNC: 20 U/L
ANION GAP SERPL CALC-SCNC: 7 MMOL/L (ref 0–18)
AST SERPL-CCNC: 19 U/L (ref 15–37)
BASOPHILS # BLD AUTO: 0.07 X10(3) UL (ref 0–0.2)
BASOPHILS NFR BLD AUTO: 0.7 %
BILIRUB SERPL-MCNC: 0.4 MG/DL (ref 0.1–2)
BUN BLD-MCNC: 19 MG/DL (ref 7–18)
CALCIUM BLD-MCNC: 8.9 MG/DL (ref 8.5–10.1)
CHLORIDE SERPL-SCNC: 110 MMOL/L (ref 98–112)
CO2 SERPL-SCNC: 22 MMOL/L (ref 21–32)
CREAT BLD-MCNC: 1.26 MG/DL
D-DIMER: <0.27 UG/ML FEU (ref ?–0.62)
EOSINOPHIL # BLD AUTO: 0.14 X10(3) UL (ref 0–0.7)
EOSINOPHIL NFR BLD AUTO: 1.4 %
ERYTHROCYTE [DISTWIDTH] IN BLOOD BY AUTOMATED COUNT: 12.8 %
GLOBULIN PLAS-MCNC: 3.8 G/DL (ref 2.8–4.4)
GLUCOSE BLD-MCNC: 107 MG/DL (ref 70–99)
HCT VFR BLD AUTO: 44 %
HGB BLD-MCNC: 14.3 G/DL
IMM GRANULOCYTES # BLD AUTO: 0.03 X10(3) UL (ref 0–1)
IMM GRANULOCYTES NFR BLD: 0.3 %
INR BLD: 2.28 (ref 0.88–1.11)
LYMPHOCYTES # BLD AUTO: 1.7 X10(3) UL (ref 1–4)
LYMPHOCYTES NFR BLD AUTO: 16.7 %
M PROTEIN MFR SERPL ELPH: 7.5 G/DL (ref 6.4–8.2)
MCH RBC QN AUTO: 32.4 PG (ref 26–34)
MCHC RBC AUTO-ENTMCNC: 32.5 G/DL (ref 31–37)
MCV RBC AUTO: 99.8 FL
MONOCYTES # BLD AUTO: 0.88 X10(3) UL (ref 0.1–1)
MONOCYTES NFR BLD AUTO: 8.7 %
NEUTROPHILS # BLD AUTO: 7.33 X10 (3) UL (ref 1.5–7.7)
NEUTROPHILS # BLD AUTO: 7.33 X10(3) UL (ref 1.5–7.7)
NEUTROPHILS NFR BLD AUTO: 72.2 %
OSMOLALITY SERPL CALC.SUM OF ELEC: 291 MOSM/KG (ref 275–295)
PLATELET # BLD AUTO: 153 10(3)UL (ref 150–450)
POTASSIUM SERPL-SCNC: 3.9 MMOL/L (ref 3.5–5.1)
PSA SERPL DL<=0.01 NG/ML-MCNC: 25.2 SECONDS (ref 11.9–14.3)
RBC # BLD AUTO: 4.41 X10(6)UL
SODIUM SERPL-SCNC: 139 MMOL/L (ref 136–145)
TROPONIN I SERPL-MCNC: <0.045 NG/ML (ref ?–0.04)
TROPONIN I SERPL-MCNC: <0.045 NG/ML (ref ?–0.04)
WBC # BLD AUTO: 10.2 X10(3) UL (ref 4–11)

## 2021-09-10 PROCEDURE — 85025 COMPLETE CBC W/AUTO DIFF WBC: CPT | Performed by: EMERGENCY MEDICINE

## 2021-09-10 PROCEDURE — 93010 ELECTROCARDIOGRAM REPORT: CPT

## 2021-09-10 PROCEDURE — 80053 COMPREHEN METABOLIC PANEL: CPT | Performed by: EMERGENCY MEDICINE

## 2021-09-10 PROCEDURE — 93005 ELECTROCARDIOGRAM TRACING: CPT

## 2021-09-10 PROCEDURE — 99284 EMERGENCY DEPT VISIT MOD MDM: CPT

## 2021-09-10 PROCEDURE — 85379 FIBRIN DEGRADATION QUANT: CPT | Performed by: EMERGENCY MEDICINE

## 2021-09-10 PROCEDURE — 84484 ASSAY OF TROPONIN QUANT: CPT | Performed by: EMERGENCY MEDICINE

## 2021-09-10 PROCEDURE — 36415 COLL VENOUS BLD VENIPUNCTURE: CPT

## 2021-09-10 PROCEDURE — 85610 PROTHROMBIN TIME: CPT | Performed by: EMERGENCY MEDICINE

## 2021-09-11 NOTE — ED PROVIDER NOTES
Patient Seen in: THE [facility] In [place]      History   Patient presents with:  Syncope    Stated Complaint: syncope and vomiting after lifting a desk-denies cp    HPI/Subjective:   HPI    22-year-old male with a history of venous throm desk-denies cp  Other systems are as noted in HPI. Constitutional and vital signs reviewed. All other systems reviewed and negative except as noted above.     Physical Exam     ED Triage Vitals   BP 09/10/21 1920 127/63   Pulse 09/10/21 1920 65   Resp -----------         ------                     CBC W/ DIFFERENTIAL[[id number]]                              Final result                 Please view results for these tests on the individual orders.    TROPONIN I   CBC W/ DIFFERENTIAL     EKG    Rate, interv

## 2021-09-11 NOTE — ED INITIAL ASSESSMENT (HPI)
Pt states he was carrying a desk and sat down to talk to his neighbor, felt lightheaded and passed out.

## 2021-09-12 LAB
ATRIAL RATE: 61 BPM
P AXIS: 37 DEGREES
P-R INTERVAL: 164 MS
Q-T INTERVAL: 424 MS
QRS DURATION: 106 MS
QTC CALCULATION (BEZET): 426 MS
R AXIS: 35 DEGREES
T AXIS: 43 DEGREES
VENTRICULAR RATE: 61 BPM

## 2021-09-27 ENCOUNTER — ANTI-COAG VISIT (OUTPATIENT)
Dept: HEMATOLOGY/ONCOLOGY | Facility: HOSPITAL | Age: 61
End: 2021-09-27

## 2021-10-04 ENCOUNTER — ANTI-COAG VISIT (OUTPATIENT)
Dept: HEMATOLOGY/ONCOLOGY | Facility: HOSPITAL | Age: 61
End: 2021-10-04

## 2021-10-04 RX ORDER — GEMFIBROZIL 600 MG/1
600 TABLET, FILM COATED ORAL
Qty: 180 TABLET | Refills: 0 | Status: SHIPPED | OUTPATIENT
Start: 2021-10-04 | End: 2022-01-03

## 2021-10-12 ENCOUNTER — ANTI-COAG VISIT (OUTPATIENT)
Dept: HEMATOLOGY/ONCOLOGY | Facility: HOSPITAL | Age: 61
End: 2021-10-12

## 2021-10-18 ENCOUNTER — ANTI-COAG VISIT (OUTPATIENT)
Dept: HEMATOLOGY/ONCOLOGY | Facility: HOSPITAL | Age: 61
End: 2021-10-18

## 2021-10-25 ENCOUNTER — ANTI-COAG VISIT (OUTPATIENT)
Dept: HEMATOLOGY/ONCOLOGY | Facility: HOSPITAL | Age: 61
End: 2021-10-25

## 2021-11-14 DIAGNOSIS — K21.00 REFLUX ESOPHAGITIS: ICD-10-CM

## 2021-11-15 RX ORDER — PANTOPRAZOLE SODIUM 40 MG/1
40 TABLET, DELAYED RELEASE ORAL DAILY
Qty: 90 TABLET | Refills: 1 | Status: SHIPPED | OUTPATIENT
Start: 2021-11-15 | End: 2022-02-05

## 2021-11-22 ENCOUNTER — ANTI-COAG VISIT (OUTPATIENT)
Dept: HEMATOLOGY/ONCOLOGY | Facility: HOSPITAL | Age: 61
End: 2021-11-22

## 2021-12-06 ENCOUNTER — ANTI-COAG VISIT (OUTPATIENT)
Dept: HEMATOLOGY/ONCOLOGY | Facility: HOSPITAL | Age: 61
End: 2021-12-06

## 2021-12-20 ENCOUNTER — ANTI-COAG VISIT (OUTPATIENT)
Dept: HEMATOLOGY/ONCOLOGY | Facility: HOSPITAL | Age: 61
End: 2021-12-20

## 2022-01-03 ENCOUNTER — ANTI-COAG VISIT (OUTPATIENT)
Dept: HEMATOLOGY/ONCOLOGY | Facility: HOSPITAL | Age: 62
End: 2022-01-03

## 2022-01-03 LAB — INR: 2.2 (ref 0.8–1.2)

## 2022-01-04 RX ORDER — GEMFIBROZIL 600 MG/1
TABLET, FILM COATED ORAL
Qty: 180 TABLET | Refills: 0 | Status: SHIPPED | OUTPATIENT
Start: 2022-01-04

## 2022-01-17 ENCOUNTER — ANTI-COAG VISIT (OUTPATIENT)
Dept: HEMATOLOGY/ONCOLOGY | Facility: HOSPITAL | Age: 62
End: 2022-01-17

## 2022-01-17 LAB — INR: 2.2 (ref 0.8–1.2)

## 2022-01-31 ENCOUNTER — ANTI-COAG VISIT (OUTPATIENT)
Dept: HEMATOLOGY/ONCOLOGY | Facility: HOSPITAL | Age: 62
End: 2022-01-31

## 2022-01-31 LAB — INR: 2.1 (ref 0.8–1.2)

## 2022-02-07 RX ORDER — PANTOPRAZOLE SODIUM 40 MG/1
40 TABLET, DELAYED RELEASE ORAL DAILY
Qty: 90 TABLET | Refills: 1 | Status: SHIPPED | OUTPATIENT
Start: 2022-02-07

## 2022-02-14 ENCOUNTER — ANTI-COAG VISIT (OUTPATIENT)
Dept: HEMATOLOGY/ONCOLOGY | Facility: HOSPITAL | Age: 62
End: 2022-02-14

## 2022-02-14 LAB — INR: 2 (ref 0.8–1.2)

## 2022-02-28 ENCOUNTER — ANTI-COAG VISIT (OUTPATIENT)
Dept: HEMATOLOGY/ONCOLOGY | Facility: HOSPITAL | Age: 62
End: 2022-02-28

## 2022-02-28 LAB — INR: 2.2 (ref 0.8–1.2)

## 2022-03-15 ENCOUNTER — ANTI-COAG VISIT (OUTPATIENT)
Dept: HEMATOLOGY/ONCOLOGY | Facility: HOSPITAL | Age: 62
End: 2022-03-15

## 2022-03-15 LAB — INR: 2.7 (ref 0.8–1.2)

## 2022-03-28 ENCOUNTER — ANTI-COAG VISIT (OUTPATIENT)
Dept: HEMATOLOGY/ONCOLOGY | Facility: HOSPITAL | Age: 62
End: 2022-03-28

## 2022-03-28 LAB — INR: 2.7 (ref 0.8–1.2)

## 2022-04-11 ENCOUNTER — ANTI-COAG VISIT (OUTPATIENT)
Dept: HEMATOLOGY/ONCOLOGY | Facility: HOSPITAL | Age: 62
End: 2022-04-11

## 2022-04-11 LAB — INR: 2.3 (ref 0.8–1.2)

## 2022-04-11 RX ORDER — GEMFIBROZIL 600 MG/1
600 TABLET, FILM COATED ORAL
Qty: 180 TABLET | Refills: 0 | Status: SHIPPED | OUTPATIENT
Start: 2022-04-11

## 2022-04-25 ENCOUNTER — ANTI-COAG VISIT (OUTPATIENT)
Dept: HEMATOLOGY/ONCOLOGY | Facility: HOSPITAL | Age: 62
End: 2022-04-25

## 2022-04-25 LAB — INR: 2.5 (ref 0.8–1.2)

## 2022-05-09 ENCOUNTER — ANTI-COAG VISIT (OUTPATIENT)
Dept: HEMATOLOGY/ONCOLOGY | Facility: HOSPITAL | Age: 62
End: 2022-05-09

## 2022-05-09 LAB — INR: 2.4 (ref 0.8–1.2)

## 2022-05-23 ENCOUNTER — ANTI-COAG VISIT (OUTPATIENT)
Dept: HEMATOLOGY/ONCOLOGY | Facility: HOSPITAL | Age: 62
End: 2022-05-23

## 2022-05-23 LAB — INR: 3.1 (ref 0.8–1.2)

## 2022-06-06 ENCOUNTER — ANTI-COAG VISIT (OUTPATIENT)
Dept: HEMATOLOGY/ONCOLOGY | Facility: HOSPITAL | Age: 62
End: 2022-06-06

## 2022-06-06 LAB — INR: 2.1 (ref 0.8–1.2)

## 2022-06-20 ENCOUNTER — ANTI-COAG VISIT (OUTPATIENT)
Dept: HEMATOLOGY/ONCOLOGY | Facility: HOSPITAL | Age: 62
End: 2022-06-20

## 2022-06-20 LAB — INR: 2.6 (ref 0.8–1.2)

## 2022-07-06 ENCOUNTER — TELEPHONE (OUTPATIENT)
Dept: FAMILY MEDICINE CLINIC | Facility: CLINIC | Age: 62
End: 2022-07-06

## 2022-07-06 DIAGNOSIS — Z00.00 BLOOD TESTS FOR ROUTINE GENERAL PHYSICAL EXAMINATION: Primary | ICD-10-CM

## 2022-07-06 RX ORDER — GEMFIBROZIL 600 MG/1
600 TABLET, FILM COATED ORAL
Qty: 180 TABLET | Refills: 0 | Status: SHIPPED | OUTPATIENT
Start: 2022-07-06

## 2022-07-17 LAB
ABSOLUTE BASOPHILS: 68 CELLS/UL (ref 0–200)
ABSOLUTE EOSINOPHILS: 203 CELLS/UL (ref 15–500)
ABSOLUTE LYMPHOCYTES: 2304 CELLS/UL (ref 850–3900)
ABSOLUTE MONOCYTES: 530 CELLS/UL (ref 200–950)
ABSOLUTE NEUTROPHILS: 2096 CELLS/UL (ref 1500–7800)
ALBUMIN/GLOBULIN RATIO: 1.5 (CALC) (ref 1–2.5)
ALBUMIN: 4.3 G/DL (ref 3.6–5.1)
ALKALINE PHOSPHATASE: 40 U/L (ref 35–144)
ALT: 13 U/L (ref 9–46)
AST: 17 U/L (ref 10–35)
BASOPHILS: 1.3 %
BILIRUBIN, TOTAL: 0.6 MG/DL (ref 0.2–1.2)
BUN: 15 MG/DL (ref 7–25)
CALCIUM: 9.4 MG/DL (ref 8.6–10.3)
CARBON DIOXIDE: 29 MMOL/L (ref 20–32)
CHLORIDE: 106 MMOL/L (ref 98–110)
CHOL/HDLC RATIO: 3.4 (CALC)
CHOLESTEROL, TOTAL: 181 MG/DL
CREATININE: 1.07 MG/DL (ref 0.7–1.35)
EGFR: 78 ML/MIN/1.73M2
EOSINOPHILS: 3.9 %
GLOBULIN: 2.8 G/DL (CALC) (ref 1.9–3.7)
GLUCOSE: 88 MG/DL (ref 65–99)
HDL CHOLESTEROL: 53 MG/DL
HEMATOCRIT: 41.4 % (ref 38.5–50)
HEMOGLOBIN A1C: 5.4 % OF TOTAL HGB
HEMOGLOBIN: 13.9 G/DL (ref 13.2–17.1)
LDL-CHOLESTEROL: 105 MG/DL (CALC)
LYMPHOCYTES: 44.3 %
MCH: 32 PG (ref 27–33)
MCHC: 33.6 G/DL (ref 32–36)
MCV: 95.4 FL (ref 80–100)
MONOCYTES: 10.2 %
MPV: 11.2 FL (ref 7.5–12.5)
NEUTROPHILS: 40.3 %
NON-HDL CHOLESTEROL: 128 MG/DL (CALC)
PLATELET COUNT: 203 THOUSAND/UL (ref 140–400)
POTASSIUM: 4.8 MMOL/L (ref 3.5–5.3)
PROTEIN, TOTAL: 7.1 G/DL (ref 6.1–8.1)
RDW: 13 % (ref 11–15)
RED BLOOD CELL COUNT: 4.34 MILLION/UL (ref 4.2–5.8)
SODIUM: 142 MMOL/L (ref 135–146)
TRIGLYCERIDES: 124 MG/DL
WHITE BLOOD CELL COUNT: 5.2 THOUSAND/UL (ref 3.8–10.8)

## 2022-07-18 ENCOUNTER — ANTI-COAG VISIT (OUTPATIENT)
Dept: HEMATOLOGY/ONCOLOGY | Facility: HOSPITAL | Age: 62
End: 2022-07-18

## 2022-07-18 LAB — INR: 2 (ref 0.8–1.2)

## 2022-07-25 ENCOUNTER — OFFICE VISIT (OUTPATIENT)
Dept: FAMILY MEDICINE CLINIC | Facility: CLINIC | Age: 62
End: 2022-07-25
Payer: COMMERCIAL

## 2022-07-25 VITALS
HEIGHT: 70 IN | RESPIRATION RATE: 16 BRPM | HEART RATE: 71 BPM | WEIGHT: 255 LBS | DIASTOLIC BLOOD PRESSURE: 82 MMHG | OXYGEN SATURATION: 98 % | BODY MASS INDEX: 36.51 KG/M2 | SYSTOLIC BLOOD PRESSURE: 122 MMHG

## 2022-07-25 DIAGNOSIS — Z86.718 HISTORY OF DVT (DEEP VEIN THROMBOSIS): ICD-10-CM

## 2022-07-25 DIAGNOSIS — M47.816 LUMBAR FACET ARTHROPATHY: ICD-10-CM

## 2022-07-25 DIAGNOSIS — D69.6 THROMBOCYTOPENIA (HCC): ICD-10-CM

## 2022-07-25 DIAGNOSIS — Z00.00 ANNUAL PHYSICAL EXAM: Primary | ICD-10-CM

## 2022-07-25 DIAGNOSIS — Z80.0 FAMILY HISTORY OF MALIGNANT NEOPLASM OF COLON: ICD-10-CM

## 2022-07-25 DIAGNOSIS — Z86.711 HISTORY OF PULMONARY EMBOLISM: ICD-10-CM

## 2022-07-25 DIAGNOSIS — Z79.01 CURRENT USE OF LONG TERM ANTICOAGULATION: ICD-10-CM

## 2022-07-25 DIAGNOSIS — Z12.11 SCREEN FOR COLON CANCER: ICD-10-CM

## 2022-07-25 PROCEDURE — 3074F SYST BP LT 130 MM HG: CPT | Performed by: FAMILY MEDICINE

## 2022-07-25 PROCEDURE — 99396 PREV VISIT EST AGE 40-64: CPT | Performed by: FAMILY MEDICINE

## 2022-07-25 PROCEDURE — 3008F BODY MASS INDEX DOCD: CPT | Performed by: FAMILY MEDICINE

## 2022-07-25 PROCEDURE — 99214 OFFICE O/P EST MOD 30 MIN: CPT | Performed by: FAMILY MEDICINE

## 2022-07-25 PROCEDURE — 3079F DIAST BP 80-89 MM HG: CPT | Performed by: FAMILY MEDICINE

## 2022-08-01 ENCOUNTER — ANTI-COAG VISIT (OUTPATIENT)
Dept: HEMATOLOGY/ONCOLOGY | Facility: HOSPITAL | Age: 62
End: 2022-08-01

## 2022-08-01 ENCOUNTER — OFFICE VISIT (OUTPATIENT)
Dept: HEMATOLOGY/ONCOLOGY | Facility: HOSPITAL | Age: 62
End: 2022-08-01
Attending: INTERNAL MEDICINE
Payer: COMMERCIAL

## 2022-08-01 VITALS
TEMPERATURE: 98 F | OXYGEN SATURATION: 98 % | HEART RATE: 61 BPM | WEIGHT: 259 LBS | BODY MASS INDEX: 37 KG/M2 | RESPIRATION RATE: 18 BRPM | DIASTOLIC BLOOD PRESSURE: 78 MMHG | SYSTOLIC BLOOD PRESSURE: 125 MMHG

## 2022-08-01 DIAGNOSIS — Z79.01 CURRENT USE OF LONG TERM ANTICOAGULATION: Primary | ICD-10-CM

## 2022-08-01 DIAGNOSIS — I82.90 VTE (VENOUS THROMBOEMBOLISM): ICD-10-CM

## 2022-08-01 LAB — INR: 2.6 (ref 0.8–1.2)

## 2022-08-01 PROCEDURE — 99213 OFFICE O/P EST LOW 20 MIN: CPT | Performed by: INTERNAL MEDICINE

## 2022-08-05 DIAGNOSIS — K21.00 REFLUX ESOPHAGITIS: ICD-10-CM

## 2022-08-09 RX ORDER — PANTOPRAZOLE SODIUM 40 MG/1
40 TABLET, DELAYED RELEASE ORAL DAILY
Qty: 90 TABLET | Refills: 1 | Status: SHIPPED | OUTPATIENT
Start: 2022-08-09

## 2022-08-15 ENCOUNTER — ANTI-COAG VISIT (OUTPATIENT)
Dept: HEMATOLOGY/ONCOLOGY | Facility: HOSPITAL | Age: 62
End: 2022-08-15

## 2022-08-15 LAB — INR: 2.4 (ref 0.8–1.2)

## 2022-09-12 ENCOUNTER — ANTI-COAG VISIT (OUTPATIENT)
Dept: HEMATOLOGY/ONCOLOGY | Facility: HOSPITAL | Age: 62
End: 2022-09-12

## 2022-09-12 LAB — INR: 2.6 (ref 0.8–1.2)

## 2022-09-26 ENCOUNTER — ANTI-COAG VISIT (OUTPATIENT)
Dept: HEMATOLOGY/ONCOLOGY | Facility: HOSPITAL | Age: 62
End: 2022-09-26

## 2022-09-26 LAB — INR: 3 (ref 0.8–1.2)

## 2022-10-03 RX ORDER — GEMFIBROZIL 600 MG/1
600 TABLET, FILM COATED ORAL
Qty: 180 TABLET | Refills: 1 | Status: SHIPPED | OUTPATIENT
Start: 2022-10-03

## 2022-10-03 NOTE — TELEPHONE ENCOUNTER
Requesting refill on gemfibrozil. LOV 7/25/2022. Last lipid panel on 7/16/2022. Rx refilled per protocol.

## 2022-10-07 ENCOUNTER — ANTI-COAG VISIT (OUTPATIENT)
Dept: HEMATOLOGY/ONCOLOGY | Facility: HOSPITAL | Age: 62
End: 2022-10-07

## 2022-10-07 LAB — INR: 2.3 (ref 0.8–1.2)

## 2022-10-22 LAB — INR: 3.2 (ref 0.8–1.2)

## 2022-10-24 ENCOUNTER — ANTI-COAG VISIT (OUTPATIENT)
Dept: HEMATOLOGY/ONCOLOGY | Facility: HOSPITAL | Age: 62
End: 2022-10-24

## 2022-10-31 ENCOUNTER — ANTI-COAG VISIT (OUTPATIENT)
Dept: HEMATOLOGY/ONCOLOGY | Facility: HOSPITAL | Age: 62
End: 2022-10-31

## 2022-10-31 LAB — INR: 3.8 (ref 0.8–1.2)

## 2022-11-01 ENCOUNTER — TELEPHONE (OUTPATIENT)
Dept: HEMATOLOGY/ONCOLOGY | Facility: HOSPITAL | Age: 62
End: 2022-11-01

## 2022-11-01 NOTE — TELEPHONE ENCOUNTER
Patient is calling because he does his INR testing at home and he only has one strip and 2 needles left to test, and the company he gets it from is completly out and on back order so he is wondering if there is somewhere else he can get it from.

## 2022-11-07 ENCOUNTER — ANTI-COAG VISIT (OUTPATIENT)
Dept: HEMATOLOGY/ONCOLOGY | Facility: HOSPITAL | Age: 62
End: 2022-11-07

## 2022-11-14 ENCOUNTER — TELEPHONE (OUTPATIENT)
Dept: HEMATOLOGY/ONCOLOGY | Facility: HOSPITAL | Age: 62
End: 2022-11-14

## 2022-11-14 NOTE — TELEPHONE ENCOUNTER
Patient called regarding testing strips. He said he is unable to get the strips for his at home testing. Please call when able. Thank you.

## 2022-11-23 ENCOUNTER — TELEPHONE (OUTPATIENT)
Dept: HEMATOLOGY/ONCOLOGY | Facility: HOSPITAL | Age: 62
End: 2022-11-23

## 2022-11-28 ENCOUNTER — ANTI-COAG VISIT (OUTPATIENT)
Dept: HEMATOLOGY/ONCOLOGY | Facility: HOSPITAL | Age: 62
End: 2022-11-28

## 2022-11-28 LAB — INR: 1.9 (ref 0.8–1.2)

## 2022-12-12 ENCOUNTER — ANTI-COAG VISIT (OUTPATIENT)
Dept: HEMATOLOGY/ONCOLOGY | Facility: HOSPITAL | Age: 62
End: 2022-12-12

## 2022-12-12 LAB — INR: 2.1 (ref 0.8–1.2)

## 2022-12-24 LAB — INR: 1.7 (ref 0.8–1.2)

## 2022-12-27 ENCOUNTER — ANTI-COAG VISIT (OUTPATIENT)
Dept: HEMATOLOGY/ONCOLOGY | Facility: HOSPITAL | Age: 62
End: 2022-12-27

## 2023-01-03 ENCOUNTER — ANTI-COAG VISIT (OUTPATIENT)
Dept: HEMATOLOGY/ONCOLOGY | Facility: HOSPITAL | Age: 63
End: 2023-01-03

## 2023-01-03 LAB — INR: 2.4 (ref 0.8–1.2)

## 2023-01-16 ENCOUNTER — ANTI-COAG VISIT (OUTPATIENT)
Dept: HEMATOLOGY/ONCOLOGY | Facility: HOSPITAL | Age: 63
End: 2023-01-16

## 2023-01-16 LAB — INR: 3 (ref 0.8–1.2)

## 2023-02-01 DIAGNOSIS — K21.00 REFLUX ESOPHAGITIS: ICD-10-CM

## 2023-02-03 RX ORDER — PANTOPRAZOLE SODIUM 40 MG/1
40 TABLET, DELAYED RELEASE ORAL DAILY
Qty: 90 TABLET | Refills: 1 | Status: SHIPPED | OUTPATIENT
Start: 2023-02-03

## 2023-02-13 ENCOUNTER — ANTI-COAG VISIT (OUTPATIENT)
Dept: HEMATOLOGY/ONCOLOGY | Facility: HOSPITAL | Age: 63
End: 2023-02-13

## 2023-02-13 LAB — INR: 2.7 (ref 2–3)

## 2023-02-17 ENCOUNTER — OFFICE VISIT (OUTPATIENT)
Dept: FAMILY MEDICINE CLINIC | Facility: CLINIC | Age: 63
End: 2023-02-17
Payer: COMMERCIAL

## 2023-02-17 VITALS
OXYGEN SATURATION: 99 % | HEIGHT: 70 IN | DIASTOLIC BLOOD PRESSURE: 84 MMHG | HEART RATE: 65 BPM | BODY MASS INDEX: 38.94 KG/M2 | SYSTOLIC BLOOD PRESSURE: 128 MMHG | RESPIRATION RATE: 16 BRPM | WEIGHT: 272 LBS

## 2023-02-17 DIAGNOSIS — R91.8 ABNORMALITY OF LUNG ON CXR: Primary | ICD-10-CM

## 2023-02-17 PROCEDURE — 3008F BODY MASS INDEX DOCD: CPT | Performed by: FAMILY MEDICINE

## 2023-02-17 PROCEDURE — 3074F SYST BP LT 130 MM HG: CPT | Performed by: FAMILY MEDICINE

## 2023-02-17 PROCEDURE — 3079F DIAST BP 80-89 MM HG: CPT | Performed by: FAMILY MEDICINE

## 2023-02-17 PROCEDURE — 99214 OFFICE O/P EST MOD 30 MIN: CPT | Performed by: FAMILY MEDICINE

## 2023-02-27 ENCOUNTER — ANTI-COAG VISIT (OUTPATIENT)
Dept: HEMATOLOGY/ONCOLOGY | Facility: HOSPITAL | Age: 63
End: 2023-02-27

## 2023-02-27 LAB — INR: 2.7 (ref 0.8–1.2)

## 2023-03-13 ENCOUNTER — ANTI-COAG VISIT (OUTPATIENT)
Dept: HEMATOLOGY/ONCOLOGY | Facility: HOSPITAL | Age: 63
End: 2023-03-13

## 2023-03-13 LAB — INR: 3 (ref 0.8–1.2)

## 2023-03-24 ENCOUNTER — TELEPHONE (OUTPATIENT)
Dept: HEMATOLOGY/ONCOLOGY | Facility: HOSPITAL | Age: 63
End: 2023-03-24

## 2023-03-24 ENCOUNTER — HOSPITAL ENCOUNTER (OUTPATIENT)
Dept: ULTRASOUND IMAGING | Facility: HOSPITAL | Age: 63
Discharge: HOME | End: 2023-03-24
Attending: INTERNAL MEDICINE
Payer: COMMERCIAL

## 2023-03-24 DIAGNOSIS — I82.90 VTE (VENOUS THROMBOEMBOLISM): Primary | ICD-10-CM

## 2023-03-24 DIAGNOSIS — I82.90 VTE (VENOUS THROMBOEMBOLISM): ICD-10-CM

## 2023-03-24 PROCEDURE — 93971 EXTREMITY STUDY: CPT | Performed by: INTERNAL MEDICINE

## 2023-03-24 PROCEDURE — 93970 EXTREMITY STUDY: CPT | Performed by: INTERNAL MEDICINE

## 2023-03-24 NOTE — TELEPHONE ENCOUNTER
Hx of PE, currently on Warfarin(2.5 mg last night, %mg due tonight)    Has been having right calf pain for a couple of days. No redness or swelling to area. Denies sob or chest pain, no light headness or dizziness. No other complaints per patient. Please advise.

## 2023-03-24 NOTE — TELEPHONE ENCOUNTER
Called  Patient that scheduled US out too far for Stat, called scheduling and rescheduled for today at 230 pm at [facility]

## 2023-03-24 NOTE — TELEPHONE ENCOUNTER
Dr. Stacie Brothers aware no DVT in US. Spoke with patient and aware to follow up with PCP if pain continues.

## 2023-03-27 ENCOUNTER — ANTI-COAG VISIT (OUTPATIENT)
Dept: HEMATOLOGY/ONCOLOGY | Facility: HOSPITAL | Age: 63
End: 2023-03-27

## 2023-03-27 LAB — INR: 2.5 (ref 0.8–1.2)

## 2023-04-05 RX ORDER — GEMFIBROZIL 600 MG/1
TABLET, FILM COATED ORAL
Qty: 180 TABLET | Refills: 1 | Status: SHIPPED | OUTPATIENT
Start: 2023-04-05

## 2023-04-10 ENCOUNTER — ANTI-COAG VISIT (OUTPATIENT)
Dept: HEMATOLOGY/ONCOLOGY | Facility: HOSPITAL | Age: 63
End: 2023-04-10

## 2023-04-10 LAB — INR: 3.4 (ref 0.8–1.2)

## 2023-04-17 ENCOUNTER — ANTI-COAG VISIT (OUTPATIENT)
Dept: HEMATOLOGY/ONCOLOGY | Facility: HOSPITAL | Age: 63
End: 2023-04-17

## 2023-04-17 LAB — INR: 2.5 (ref 0.8–1.2)

## 2023-04-24 ENCOUNTER — ANTI-COAG VISIT (OUTPATIENT)
Dept: HEMATOLOGY/ONCOLOGY | Facility: HOSPITAL | Age: 63
End: 2023-04-24

## 2023-04-24 LAB — INR: 2.1 (ref 0.8–1.2)

## 2023-05-08 ENCOUNTER — ANTI-COAG VISIT (OUTPATIENT)
Dept: HEMATOLOGY/ONCOLOGY | Facility: HOSPITAL | Age: 63
End: 2023-05-08

## 2023-05-08 LAB — INR: 2.2 (ref 0.8–1.2)

## 2023-05-22 ENCOUNTER — ANTI-COAG VISIT (OUTPATIENT)
Dept: HEMATOLOGY/ONCOLOGY | Facility: HOSPITAL | Age: 63
End: 2023-05-22

## 2023-05-22 LAB — INR: 1.4 (ref 0.8–1.2)

## 2023-05-23 ENCOUNTER — SOCIAL WORK SERVICES (OUTPATIENT)
Dept: HEMATOLOGY/ONCOLOGY | Facility: HOSPITAL | Age: 63
End: 2023-05-23

## 2023-05-23 NOTE — PROGRESS NOTES
BROWN faxed completed work forms to [employer] [phone] along with last MD note and last 3 months of INR values. BROWN updated pt via ph [phone] that forms were completed and faxed per his request. BROWN also sent [employer] message with a forms/confirmation. KAMRAN FryeW   at [address], [address] [facility] Vishnu Montiel [address] [place] [address] [place]  Ph: [phone]. [email or web address]  Fax: [phone]

## 2023-05-27 LAB — INR: 2 (ref 0.8–1.2)

## 2023-05-30 ENCOUNTER — ANTI-COAG VISIT (OUTPATIENT)
Dept: HEMATOLOGY/ONCOLOGY | Facility: HOSPITAL | Age: 63
End: 2023-05-30

## 2023-06-05 ENCOUNTER — ANTI-COAG VISIT (OUTPATIENT)
Dept: HEMATOLOGY/ONCOLOGY | Facility: HOSPITAL | Age: 63
End: 2023-06-05

## 2023-06-05 LAB — INR: 2.1 (ref 0.8–1.2)

## 2023-06-19 ENCOUNTER — ANTI-COAG VISIT (OUTPATIENT)
Dept: HEMATOLOGY/ONCOLOGY | Facility: HOSPITAL | Age: 63
End: 2023-06-19

## 2023-06-19 LAB — INR: 2 (ref 0.8–1.2)

## 2023-07-01 LAB — INR: 2.2 (ref 2–3)

## 2023-07-03 ENCOUNTER — ANTI-COAG VISIT (OUTPATIENT)
Dept: HEMATOLOGY/ONCOLOGY | Facility: HOSPITAL | Age: 63
End: 2023-07-03

## 2023-07-17 ENCOUNTER — ANTI-COAG VISIT (OUTPATIENT)
Dept: HEMATOLOGY/ONCOLOGY | Facility: HOSPITAL | Age: 63
End: 2023-07-17

## 2023-07-17 LAB — INR: 2 (ref 0.8–1.2)

## 2023-07-18 ENCOUNTER — TELEPHONE (OUTPATIENT)
Dept: FAMILY MEDICINE CLINIC | Facility: CLINIC | Age: 63
End: 2023-07-18

## 2023-07-18 NOTE — TELEPHONE ENCOUNTER
Called pt to help schedule an appt on a Saturday with Dr. Shahriar Mcclellan. I had him on my reminders as he had requested something back on 5/25/23 I promised him ill keep him on my reminders I LVM to call us back if Saturday 8/19/23 works for him.

## 2023-07-23 ENCOUNTER — APPOINTMENT (OUTPATIENT)
Dept: GENERAL RADIOLOGY | Age: 63
End: 2023-07-23
Attending: EMERGENCY MEDICINE
Payer: COMMERCIAL

## 2023-07-23 ENCOUNTER — HOSPITAL ENCOUNTER (EMERGENCY)
Age: 63
Discharge: HOME | End: 2023-07-23
Attending: EMERGENCY MEDICINE
Payer: COMMERCIAL

## 2023-07-23 VITALS
TEMPERATURE: 98 F | RESPIRATION RATE: 18 BRPM | DIASTOLIC BLOOD PRESSURE: 66 MMHG | HEART RATE: 61 BPM | WEIGHT: 255 LBS | SYSTOLIC BLOOD PRESSURE: 129 MMHG | HEIGHT: 70 IN | BODY MASS INDEX: 36.51 KG/M2 | OXYGEN SATURATION: 94 %

## 2023-07-23 DIAGNOSIS — M54.50 ACUTE RIGHT-SIDED LOW BACK PAIN WITHOUT SCIATICA: Primary | ICD-10-CM

## 2023-07-23 PROCEDURE — 99283 EMERGENCY DEPT VISIT LOW MDM: CPT

## 2023-07-23 PROCEDURE — 99284 EMERGENCY DEPT VISIT MOD MDM: CPT

## 2023-07-23 PROCEDURE — 72100 X-RAY EXAM L-S SPINE 2/3 VWS: CPT | Performed by: EMERGENCY MEDICINE

## 2023-07-23 RX ORDER — METHYLPREDNISOLONE 4 MG/1
TABLET ORAL
Qty: 1 EACH | Refills: 0 | Status: SHIPPED | OUTPATIENT
Start: 2023-07-23

## 2023-07-23 RX ORDER — HYDROCODONE BITARTRATE AND ACETAMINOPHEN 5; 325 MG/1; MG/1
1-2 TABLET ORAL EVERY 4 HOURS PRN
Qty: 12 TABLET | Refills: 0 | Status: SHIPPED | OUTPATIENT
Start: 2023-07-23

## 2023-07-23 RX ORDER — HYDROCODONE BITARTRATE AND ACETAMINOPHEN 5; 325 MG/1; MG/1
1 TABLET ORAL ONCE
Status: COMPLETED | OUTPATIENT
Start: 2023-07-23 | End: 2023-07-23

## 2023-07-23 RX ORDER — DEXAMETHASONE SODIUM PHOSPHATE 4 MG/ML
10 VIAL (ML) INJECTION ONCE
Status: DISCONTINUED | OUTPATIENT
Start: 2023-07-23 | End: 2023-07-23

## 2023-07-23 RX ORDER — TIZANIDINE HYDROCHLORIDE 2 MG/1
2 CAPSULE, GELATIN COATED ORAL 3 TIMES DAILY
Qty: 30 CAPSULE | Refills: 0 | Status: SHIPPED | OUTPATIENT
Start: 2023-07-23

## 2023-07-23 RX ORDER — DEXAMETHASONE 4 MG/1
10 TABLET ORAL ONCE
Status: COMPLETED | OUTPATIENT
Start: 2023-07-23 | End: 2023-07-23

## 2023-07-26 DIAGNOSIS — K21.00 REFLUX ESOPHAGITIS: ICD-10-CM

## 2023-07-28 RX ORDER — PANTOPRAZOLE SODIUM 40 MG/1
40 TABLET, DELAYED RELEASE ORAL DAILY
Qty: 90 TABLET | Refills: 0 | Status: SHIPPED | OUTPATIENT
Start: 2023-07-28

## 2023-07-31 ENCOUNTER — ANTI-COAG VISIT (OUTPATIENT)
Dept: HEMATOLOGY/ONCOLOGY | Facility: HOSPITAL | Age: 63
End: 2023-07-31

## 2023-07-31 LAB — INR: 2.8 (ref 0.8–1.2)

## 2023-08-02 ENCOUNTER — TELEPHONE (OUTPATIENT)
Dept: FAMILY MEDICINE CLINIC | Facility: CLINIC | Age: 63
End: 2023-08-02

## 2023-08-02 DIAGNOSIS — Z00.00 ROUTINE GENERAL MEDICAL EXAMINATION AT A HEALTH CARE FACILITY: Primary | ICD-10-CM

## 2023-08-02 DIAGNOSIS — Z12.5 SCREENING FOR MALIGNANT NEOPLASM OF PROSTATE: ICD-10-CM

## 2023-08-02 NOTE — TELEPHONE ENCOUNTER
Annual physical scheduled for 08/19/23. Please order labs. Patient informed to get labs done no sooner than a week prior to their scheduled annual physical.     Patient goes to [facility] and would like those printed to be picked up when ready.   Please Advise

## 2023-08-05 LAB
ABSOLUTE BASOPHILS: 57 CELLS/UL (ref 0–200)
ABSOLUTE EOSINOPHILS: 156 CELLS/UL (ref 15–500)
ABSOLUTE LYMPHOCYTES: 2954 CELLS/UL (ref 850–3900)
ABSOLUTE MONOCYTES: 667 CELLS/UL (ref 200–950)
ABSOLUTE NEUTROPHILS: 3266 CELLS/UL (ref 1500–7800)
ALBUMIN/GLOBULIN RATIO: 1.6 (CALC) (ref 1–2.5)
ALBUMIN: 4.6 G/DL (ref 3.6–5.1)
ALKALINE PHOSPHATASE: 44 U/L (ref 35–144)
ALT: 15 U/L (ref 9–46)
AST: 18 U/L (ref 10–35)
BASOPHILS: 0.8 %
BILIRUBIN, TOTAL: 0.6 MG/DL (ref 0.2–1.2)
BUN: 17 MG/DL (ref 7–25)
CALCIUM: 10.1 MG/DL (ref 8.6–10.3)
CARBON DIOXIDE: 27 MMOL/L (ref 20–32)
CHLORIDE: 105 MMOL/L (ref 98–110)
CHOL/HDLC RATIO: 3.3 (CALC)
CHOLESTEROL, TOTAL: 190 MG/DL
CREATININE: 1.14 MG/DL (ref 0.7–1.35)
EGFR: 72 ML/MIN/1.73M2
EOSINOPHILS: 2.2 %
GLOBULIN: 2.9 G/DL (CALC) (ref 1.9–3.7)
GLUCOSE: 81 MG/DL (ref 65–99)
HDL CHOLESTEROL: 58 MG/DL
HEMATOCRIT: 42.2 % (ref 38.5–50)
HEMOGLOBIN A1C: 5.6 % OF TOTAL HGB
HEMOGLOBIN: 14.5 G/DL (ref 13.2–17.1)
LDL-CHOLESTEROL: 106 MG/DL (CALC)
LYMPHOCYTES: 41.6 %
MCH: 32.5 PG (ref 27–33)
MCHC: 34.4 G/DL (ref 32–36)
MCV: 94.6 FL (ref 80–100)
MONOCYTES: 9.4 %
MPV: 11.4 FL (ref 7.5–12.5)
NEUTROPHILS: 46 %
NON-HDL CHOLESTEROL: 132 MG/DL (CALC)
PLATELET COUNT: 212 THOUSAND/UL (ref 140–400)
POTASSIUM: 4.3 MMOL/L (ref 3.5–5.3)
PROTEIN, TOTAL: 7.5 G/DL (ref 6.1–8.1)
RDW: 12.9 % (ref 11–15)
RED BLOOD CELL COUNT: 4.46 MILLION/UL (ref 4.2–5.8)
SODIUM: 143 MMOL/L (ref 135–146)
TRIGLYCERIDES: 144 MG/DL
TSH W/REFLEX TO FT4: 0.67 MIU/L (ref 0.4–4.5)
WHITE BLOOD CELL COUNT: 7.1 THOUSAND/UL (ref 3.8–10.8)

## 2023-08-14 ENCOUNTER — ANTI-COAG VISIT (OUTPATIENT)
Dept: HEMATOLOGY/ONCOLOGY | Facility: HOSPITAL | Age: 63
End: 2023-08-14

## 2023-08-14 LAB — INR: 2.9 (ref 0.8–1.3)

## 2023-08-19 ENCOUNTER — OFFICE VISIT (OUTPATIENT)
Dept: FAMILY MEDICINE CLINIC | Facility: CLINIC | Age: 63
End: 2023-08-19
Payer: COMMERCIAL

## 2023-08-19 VITALS
HEART RATE: 58 BPM | RESPIRATION RATE: 18 BRPM | SYSTOLIC BLOOD PRESSURE: 128 MMHG | BODY MASS INDEX: 36.36 KG/M2 | HEIGHT: 70 IN | DIASTOLIC BLOOD PRESSURE: 82 MMHG | OXYGEN SATURATION: 96 % | WEIGHT: 254 LBS

## 2023-08-19 DIAGNOSIS — M46.1 SACROILIITIS (HCC): ICD-10-CM

## 2023-08-19 DIAGNOSIS — D69.6 THROMBOCYTOPENIA (HCC): ICD-10-CM

## 2023-08-19 DIAGNOSIS — Z00.00 ANNUAL PHYSICAL EXAM: Primary | ICD-10-CM

## 2023-08-19 DIAGNOSIS — M47.816 LUMBAR FACET ARTHROPATHY: ICD-10-CM

## 2023-08-19 DIAGNOSIS — K21.00 GASTROESOPHAGEAL REFLUX DISEASE WITH ESOPHAGITIS WITHOUT HEMORRHAGE: ICD-10-CM

## 2023-08-19 PROCEDURE — 3079F DIAST BP 80-89 MM HG: CPT | Performed by: FAMILY MEDICINE

## 2023-08-19 PROCEDURE — 3008F BODY MASS INDEX DOCD: CPT | Performed by: FAMILY MEDICINE

## 2023-08-19 PROCEDURE — 99396 PREV VISIT EST AGE 40-64: CPT | Performed by: FAMILY MEDICINE

## 2023-08-19 PROCEDURE — 3074F SYST BP LT 130 MM HG: CPT | Performed by: FAMILY MEDICINE

## 2023-08-28 ENCOUNTER — ANTI-COAG VISIT (OUTPATIENT)
Dept: HEMATOLOGY/ONCOLOGY | Facility: HOSPITAL | Age: 63
End: 2023-08-28

## 2023-08-28 LAB — INR: 2.1 (ref 0.8–1.2)

## 2023-09-11 ENCOUNTER — ANTI-COAG VISIT (OUTPATIENT)
Dept: HEMATOLOGY/ONCOLOGY | Facility: HOSPITAL | Age: 63
End: 2023-09-11

## 2023-09-11 LAB — INR: 2.8 (ref 0.8–1.2)

## 2023-09-19 DIAGNOSIS — I26.99 OTHER PULMONARY EMBOLISM WITHOUT ACUTE COR PULMONALE (HCC): ICD-10-CM

## 2023-09-19 RX ORDER — WARFARIN SODIUM 5 MG/1
TABLET ORAL
Qty: 135 TABLET | Refills: 0 | Status: SHIPPED | OUTPATIENT
Start: 2023-09-19

## 2023-09-19 RX ORDER — WARFARIN SODIUM 5 MG/1
TABLET ORAL
Qty: 135 TABLET | Refills: 0 | Status: SHIPPED | OUTPATIENT
Start: 2023-09-19 | End: 2023-09-19

## 2023-09-19 NOTE — TELEPHONE ENCOUNTER
Pt requesting warfarin refill. Pt overdue for MD fu visit. Warfarin one month supply refill sent to pt's pharmacy. No additional refills until pt sees MD- pt made aware.

## 2023-09-27 RX ORDER — GEMFIBROZIL 600 MG/1
600 TABLET, FILM COATED ORAL
Qty: 180 TABLET | Refills: 1 | Status: SHIPPED | OUTPATIENT
Start: 2023-09-27

## 2023-10-10 ENCOUNTER — OFFICE VISIT (OUTPATIENT)
Dept: HEMATOLOGY/ONCOLOGY | Age: 63
End: 2023-10-10
Attending: INTERNAL MEDICINE
Payer: COMMERCIAL

## 2023-10-10 VITALS
TEMPERATURE: 97 F | HEART RATE: 56 BPM | OXYGEN SATURATION: 99 % | SYSTOLIC BLOOD PRESSURE: 143 MMHG | DIASTOLIC BLOOD PRESSURE: 82 MMHG | WEIGHT: 263 LBS | BODY MASS INDEX: 38 KG/M2

## 2023-10-10 DIAGNOSIS — Z86.718 HISTORY OF DVT (DEEP VEIN THROMBOSIS): ICD-10-CM

## 2023-10-10 DIAGNOSIS — Z71.89 ENCOUNTER FOR ANTICOAGULATION DISCUSSION AND COUNSELING: ICD-10-CM

## 2023-10-10 DIAGNOSIS — Z86.711 HISTORY OF PULMONARY EMBOLISM: Primary | ICD-10-CM

## 2023-10-10 DIAGNOSIS — Z79.01 CURRENT USE OF LONG TERM ANTICOAGULATION: ICD-10-CM

## 2023-10-10 PROCEDURE — 99213 OFFICE O/P EST LOW 20 MIN: CPT | Performed by: INTERNAL MEDICINE

## 2023-10-23 ENCOUNTER — ANTI-COAG VISIT (OUTPATIENT)
Dept: HEMATOLOGY/ONCOLOGY | Facility: HOSPITAL | Age: 63
End: 2023-10-23

## 2023-10-23 LAB — INR: 2.7 (ref 0.8–1.2)

## 2023-11-06 ENCOUNTER — ANTI-COAG VISIT (OUTPATIENT)
Dept: HEMATOLOGY/ONCOLOGY | Facility: HOSPITAL | Age: 63
End: 2023-11-06

## 2023-11-06 DIAGNOSIS — K21.00 REFLUX ESOPHAGITIS: ICD-10-CM

## 2023-11-06 LAB — INR: 2 (ref 0.8–1.2)

## 2023-11-09 RX ORDER — PANTOPRAZOLE SODIUM 40 MG/1
40 TABLET, DELAYED RELEASE ORAL DAILY
Qty: 90 TABLET | Refills: 0 | Status: SHIPPED | OUTPATIENT
Start: 2023-11-09

## 2023-11-20 ENCOUNTER — ANTI-COAG VISIT (OUTPATIENT)
Dept: HEMATOLOGY/ONCOLOGY | Facility: HOSPITAL | Age: 63
End: 2023-11-20

## 2023-11-20 LAB — INR: 1.8 (ref 0.8–1.2)

## 2023-11-25 LAB — INR: 2.5 (ref 0.8–1.2)

## 2023-11-27 ENCOUNTER — ANTI-COAG VISIT (OUTPATIENT)
Dept: HEMATOLOGY/ONCOLOGY | Facility: HOSPITAL | Age: 63
End: 2023-11-27

## 2023-12-11 ENCOUNTER — ANTI-COAG VISIT (OUTPATIENT)
Dept: HEMATOLOGY/ONCOLOGY | Facility: HOSPITAL | Age: 63
End: 2023-12-11

## 2023-12-11 LAB — INR: 3.7 (ref 0.8–1.2)

## 2023-12-18 ENCOUNTER — ANTI-COAG VISIT (OUTPATIENT)
Dept: HEMATOLOGY/ONCOLOGY | Facility: HOSPITAL | Age: 63
End: 2023-12-18

## 2023-12-18 LAB — INR: 2.1 (ref 0.8–1.2)

## 2024-01-02 ENCOUNTER — ANTI-COAG VISIT (OUTPATIENT)
Dept: HEMATOLOGY/ONCOLOGY | Facility: HOSPITAL | Age: 64
End: 2024-01-02

## 2024-01-02 LAB — INR: 2.6 (ref 0.8–1.2)

## 2024-01-15 ENCOUNTER — ANTI-COAG VISIT (OUTPATIENT)
Dept: HEMATOLOGY/ONCOLOGY | Facility: HOSPITAL | Age: 64
End: 2024-01-15

## 2024-01-15 LAB — INR: 1.9 (ref 0.8–1.2)

## 2024-01-23 DIAGNOSIS — K21.00 REFLUX ESOPHAGITIS: ICD-10-CM

## 2024-01-25 RX ORDER — PANTOPRAZOLE SODIUM 40 MG/1
40 TABLET, DELAYED RELEASE ORAL DAILY
Qty: 90 TABLET | Refills: 2 | Status: SHIPPED | OUTPATIENT
Start: 2024-01-25

## 2024-01-29 ENCOUNTER — ANTI-COAG VISIT (OUTPATIENT)
Dept: HEMATOLOGY/ONCOLOGY | Facility: HOSPITAL | Age: 64
End: 2024-01-29

## 2024-01-29 LAB — INR: 2 (ref 0.8–1.2)

## 2024-02-12 ENCOUNTER — ANTI-COAG VISIT (OUTPATIENT)
Dept: HEMATOLOGY/ONCOLOGY | Facility: HOSPITAL | Age: 64
End: 2024-02-12

## 2024-02-12 LAB — INR: 2.7 (ref 0.8–1.2)

## 2024-02-26 ENCOUNTER — ANTI-COAG VISIT (OUTPATIENT)
Dept: HEMATOLOGY/ONCOLOGY | Facility: HOSPITAL | Age: 64
End: 2024-02-26

## 2024-02-26 LAB — INR: 2.4 (ref 0.8–1.2)

## 2024-03-11 ENCOUNTER — ANTI-COAG VISIT (OUTPATIENT)
Dept: HEMATOLOGY/ONCOLOGY | Facility: HOSPITAL | Age: 64
End: 2024-03-11

## 2024-03-11 LAB — INR: 2.5 (ref 0.8–1.2)

## 2024-03-21 RX ORDER — GEMFIBROZIL 600 MG/1
600 TABLET, FILM COATED ORAL
Qty: 180 TABLET | Refills: 1 | Status: SHIPPED | OUTPATIENT
Start: 2024-03-21

## 2024-03-25 ENCOUNTER — ANTI-COAG VISIT (OUTPATIENT)
Dept: HEMATOLOGY/ONCOLOGY | Facility: HOSPITAL | Age: 64
End: 2024-03-25

## 2024-03-25 LAB — INR: 2.8 (ref 0.8–1.2)

## 2024-04-08 ENCOUNTER — ANTI-COAG VISIT (OUTPATIENT)
Dept: HEMATOLOGY/ONCOLOGY | Facility: HOSPITAL | Age: 64
End: 2024-04-08

## 2024-04-08 LAB — INR: 2.3 (ref 0.8–1.2)

## 2024-04-22 ENCOUNTER — ANTI-COAG VISIT (OUTPATIENT)
Dept: HEMATOLOGY/ONCOLOGY | Facility: HOSPITAL | Age: 64
End: 2024-04-22

## 2024-04-22 LAB — INR: 2.4 (ref 0.8–1.2)

## 2024-05-06 ENCOUNTER — ANTI-COAG VISIT (OUTPATIENT)
Dept: HEMATOLOGY/ONCOLOGY | Facility: HOSPITAL | Age: 64
End: 2024-05-06

## 2024-05-06 LAB — INR: 2.3 (ref 0.8–1.3)

## 2024-05-07 ENCOUNTER — APPOINTMENT (OUTPATIENT)
Dept: GENERAL RADIOLOGY | Age: 64
End: 2024-05-07
Attending: EMERGENCY MEDICINE

## 2024-05-07 ENCOUNTER — HOSPITAL ENCOUNTER (OUTPATIENT)
Facility: HOSPITAL | Age: 64
Setting detail: OBSERVATION
Discharge: HOME | End: 2024-05-09
Attending: EMERGENCY MEDICINE | Admitting: INTERNAL MEDICINE

## 2024-05-07 DIAGNOSIS — I20.0 UNSTABLE ANGINA (HCC): ICD-10-CM

## 2024-05-07 DIAGNOSIS — R07.9 ACUTE CHEST PAIN: Primary | ICD-10-CM

## 2024-05-07 DIAGNOSIS — R79.89 TROPONIN LEVEL ELEVATED: ICD-10-CM

## 2024-05-07 LAB
ALBUMIN SERPL-MCNC: 4 G/DL (ref 3.4–5)
ALBUMIN/GLOB SERPL: 1.1 {RATIO} (ref 1–2)
ALP LIVER SERPL-CCNC: 44 U/L
ALT SERPL-CCNC: 26 U/L
ANION GAP SERPL CALC-SCNC: 6 MMOL/L (ref 0–18)
APTT PPP: 200.9 SECONDS (ref 23–36)
APTT PPP: 60.3 SECONDS (ref 23–36)
AST SERPL-CCNC: 21 U/L (ref 15–37)
ATRIAL RATE: 65 BPM
BASOPHILS # BLD AUTO: 0.05 X10(3) UL (ref 0–0.2)
BASOPHILS NFR BLD AUTO: 1 %
BILIRUB SERPL-MCNC: 0.4 MG/DL (ref 0.1–2)
BUN BLD-MCNC: 13 MG/DL (ref 9–23)
CALCIUM BLD-MCNC: 9.6 MG/DL (ref 8.5–10.1)
CHLORIDE SERPL-SCNC: 107 MMOL/L (ref 98–112)
CHOLEST SERPL-MCNC: 201 MG/DL (ref ?–200)
CO2 SERPL-SCNC: 28 MMOL/L (ref 21–32)
CREAT BLD-MCNC: 1.16 MG/DL
D DIMER PPP FEU-MCNC: <0.27 UG/ML FEU (ref ?–0.65)
EGFRCR SERPLBLD CKD-EPI 2021: 70 ML/MIN/1.73M2 (ref 60–?)
EOSINOPHIL # BLD AUTO: 0.2 X10(3) UL (ref 0–0.7)
EOSINOPHIL NFR BLD AUTO: 3.9 %
ERYTHROCYTE [DISTWIDTH] IN BLOOD BY AUTOMATED COUNT: 13.3 %
GLOBULIN PLAS-MCNC: 3.7 G/DL (ref 2.8–4.4)
GLUCOSE BLD-MCNC: 89 MG/DL (ref 70–99)
HCT VFR BLD AUTO: 39.5 %
HDLC SERPL-MCNC: 52 MG/DL (ref 40–59)
HGB BLD-MCNC: 13.5 G/DL
IMM GRANULOCYTES # BLD AUTO: 0.01 X10(3) UL (ref 0–1)
IMM GRANULOCYTES NFR BLD: 0.2 %
INR BLD: 2.06 (ref 0.8–1.2)
LDLC SERPL CALC-MCNC: 118 MG/DL (ref ?–100)
LYMPHOCYTES # BLD AUTO: 2.58 X10(3) UL (ref 1–4)
LYMPHOCYTES NFR BLD AUTO: 49.9 %
MCH RBC QN AUTO: 32.5 PG (ref 26–34)
MCHC RBC AUTO-ENTMCNC: 34.2 G/DL (ref 31–37)
MCV RBC AUTO: 95 FL
MONOCYTES # BLD AUTO: 0.6 X10(3) UL (ref 0.1–1)
MONOCYTES NFR BLD AUTO: 11.6 %
NEUTROPHILS # BLD AUTO: 1.73 X10 (3) UL (ref 1.5–7.7)
NEUTROPHILS # BLD AUTO: 1.73 X10(3) UL (ref 1.5–7.7)
NEUTROPHILS NFR BLD AUTO: 33.4 %
NONHDLC SERPL-MCNC: 149 MG/DL (ref ?–130)
OSMOLALITY SERPL CALC.SUM OF ELEC: 292 MOSM/KG (ref 275–295)
P AXIS: -10 DEGREES
P-R INTERVAL: 138 MS
PLATELET # BLD AUTO: 224 10(3)UL (ref 150–450)
POTASSIUM SERPL-SCNC: 3.7 MMOL/L (ref 3.5–5.1)
PROT SERPL-MCNC: 7.7 G/DL (ref 6.4–8.2)
PROTHROMBIN TIME: 23.5 SECONDS (ref 11.6–14.8)
Q-T INTERVAL: 426 MS
QRS DURATION: 96 MS
QTC CALCULATION (BEZET): 443 MS
R AXIS: 9 DEGREES
RBC # BLD AUTO: 4.16 X10(6)UL
SODIUM SERPL-SCNC: 141 MMOL/L (ref 136–145)
T AXIS: -10 DEGREES
TRIGL SERPL-MCNC: 177 MG/DL (ref 30–149)
TROPONIN I SERPL HS-MCNC: 119 NG/L
TROPONIN I SERPL HS-MCNC: 169 NG/L
VENTRICULAR RATE: 65 BPM
VLDLC SERPL CALC-MCNC: 31 MG/DL (ref 0–30)
WBC # BLD AUTO: 5.2 X10(3) UL (ref 4–11)

## 2024-05-07 PROCEDURE — 99223 1ST HOSP IP/OBS HIGH 75: CPT | Performed by: HOSPITALIST

## 2024-05-07 PROCEDURE — 71045 X-RAY EXAM CHEST 1 VIEW: CPT | Performed by: EMERGENCY MEDICINE

## 2024-05-07 RX ORDER — POTASSIUM CHLORIDE 20 MEQ/1
40 TABLET, EXTENDED RELEASE ORAL ONCE
Status: COMPLETED | OUTPATIENT
Start: 2024-05-07 | End: 2024-05-07

## 2024-05-07 RX ORDER — ECHINACEA PURPUREA EXTRACT 125 MG
1 TABLET ORAL
Status: DISCONTINUED | OUTPATIENT
Start: 2024-05-07 | End: 2024-05-09

## 2024-05-07 RX ORDER — POLYETHYLENE GLYCOL 3350 17 G/17G
17 POWDER, FOR SOLUTION ORAL DAILY PRN
Status: DISCONTINUED | OUTPATIENT
Start: 2024-05-07 | End: 2024-05-09

## 2024-05-07 RX ORDER — HEPARIN SODIUM AND DEXTROSE 10000; 5 [USP'U]/100ML; G/100ML
INJECTION INTRAVENOUS CONTINUOUS
Status: DISCONTINUED | OUTPATIENT
Start: 2024-05-07 | End: 2024-05-08

## 2024-05-07 RX ORDER — MELATONIN
3 NIGHTLY PRN
Status: DISCONTINUED | OUTPATIENT
Start: 2024-05-07 | End: 2024-05-09

## 2024-05-07 RX ORDER — ASCORBIC ACID 500 MG
500 TABLET ORAL 2 TIMES DAILY
COMMUNITY

## 2024-05-07 RX ORDER — HEPARIN SODIUM 1000 [USP'U]/ML
5000 INJECTION, SOLUTION INTRAVENOUS; SUBCUTANEOUS ONCE
Status: COMPLETED | OUTPATIENT
Start: 2024-05-07 | End: 2024-05-07

## 2024-05-07 RX ORDER — MULTIVITAMIN
1 TABLET ORAL DAILY
COMMUNITY

## 2024-05-07 RX ORDER — ONDANSETRON 2 MG/ML
4 INJECTION INTRAMUSCULAR; INTRAVENOUS EVERY 6 HOURS PRN
Status: DISCONTINUED | OUTPATIENT
Start: 2024-05-07 | End: 2024-05-09

## 2024-05-07 RX ORDER — ASPIRIN 325 MG
325 TABLET ORAL DAILY
Status: DISCONTINUED | OUTPATIENT
Start: 2024-05-08 | End: 2024-05-08

## 2024-05-07 RX ORDER — ASPIRIN 325 MG
325 TABLET ORAL ONCE
Status: COMPLETED | OUTPATIENT
Start: 2024-05-07 | End: 2024-05-07

## 2024-05-07 RX ORDER — BISACODYL 10 MG
10 SUPPOSITORY, RECTAL RECTAL
Status: DISCONTINUED | OUTPATIENT
Start: 2024-05-07 | End: 2024-05-09

## 2024-05-07 RX ORDER — PANTOPRAZOLE SODIUM 40 MG/1
40 TABLET, DELAYED RELEASE ORAL DAILY
Status: DISCONTINUED | OUTPATIENT
Start: 2024-05-08 | End: 2024-05-09

## 2024-05-07 RX ORDER — SENNOSIDES 8.6 MG
17.2 TABLET ORAL NIGHTLY PRN
Status: DISCONTINUED | OUTPATIENT
Start: 2024-05-07 | End: 2024-05-09

## 2024-05-07 RX ORDER — POTASSIUM CHLORIDE 14.9 MG/ML
20 INJECTION INTRAVENOUS ONCE
Status: DISCONTINUED | OUTPATIENT
Start: 2024-05-07 | End: 2024-05-07

## 2024-05-07 RX ORDER — NITROGLYCERIN 0.4 MG/1
0.4 TABLET SUBLINGUAL EVERY 5 MIN PRN
Status: DISCONTINUED | OUTPATIENT
Start: 2024-05-07 | End: 2024-05-09

## 2024-05-07 RX ORDER — ACETAMINOPHEN 500 MG
1000 TABLET ORAL EVERY 4 HOURS PRN
Status: DISCONTINUED | OUTPATIENT
Start: 2024-05-07 | End: 2024-05-09

## 2024-05-07 RX ORDER — ENEMA 19; 7 G/133ML; G/133ML
1 ENEMA RECTAL ONCE AS NEEDED
Status: DISCONTINUED | OUTPATIENT
Start: 2024-05-07 | End: 2024-05-09

## 2024-05-07 RX ORDER — METOCLOPRAMIDE HYDROCHLORIDE 5 MG/ML
10 INJECTION INTRAMUSCULAR; INTRAVENOUS EVERY 8 HOURS PRN
Status: DISCONTINUED | OUTPATIENT
Start: 2024-05-07 | End: 2024-05-09

## 2024-05-07 RX ORDER — GEMFIBROZIL 600 MG/1
600 TABLET, FILM COATED ORAL
Status: DISCONTINUED | OUTPATIENT
Start: 2024-05-07 | End: 2024-05-09

## 2024-05-07 RX ORDER — HEPARIN SODIUM AND DEXTROSE 10000; 5 [USP'U]/100ML; G/100ML
1000 INJECTION INTRAVENOUS ONCE
Status: COMPLETED | OUTPATIENT
Start: 2024-05-07 | End: 2024-05-09

## 2024-05-07 NOTE — ED PROVIDER NOTES
Patient Seen in: [facility] In [place]      History     Chief Complaint   Patient presents with    Chest Pain     Stated Complaint: Pt with intermittent chest pain for a couple of weeks    Subjective:   HPI    63-year-old male presents reporting chest pain.  He describes an aching sensation in the midsternal region.  He says he has been having pain to that area over the last week or 2 that was brought about by exertion.  Yesterday he was cutting the grass and had to stop twice due to chest pain while mowing the lawn.  He says the pain resolves in less than a minute with rest.  He reports his last stress test was many years ago.  He does also have a history of pulmonary embolism    Objective:   Past Medical History:    Arthritis    Bronchitis, not specified as acute or chronic    Cough    Current use of long term anticoagulation    DVT (deep venous thrombosis) (HCC)    Gastric reflux    Hernia of unspecified site of abdominal cavity without mention of obstruction or gangrene    Hyperlipidemia    Obesity    PE (pulmonary embolism)    Screening for malignant neoplasm of colon              Past Surgical History:   Procedure Laterality Date    Back surgery      Knee arthroscp [facility]  2/09    Tonsillectomy  61    Vasectomy  89                Social History     Socioeconomic History    Marital status:    Tobacco Use    Smoking status: Never    Smokeless tobacco: Never   Substance and Sexual Activity    Alcohol use: No     Alcohol/week: 0.0 standard drinks of alcohol     Comment: NEVER    Drug use: No   Other Topics Concern    Caffeine Concern Yes    Stress Concern No    Weight Concern Yes    Special Diet No    Exercise No    Seat Belt No     Social Determinants of Health     Food Insecurity: No Food Insecurity (5/7/2024)    Food Insecurity     Food Insecurity: Never true   Transportation Needs: No Transportation Needs (5/7/2024)    Transportation Needs     Lack of Transportation: No   Housing  Stability: Low Risk  (5/7/2024)    Housing Stability     Housing Instability: No              Review of Systems    Positive for stated complaint: Pt with intermittent chest pain for a couple of weeks  Other systems are as noted in HPI.  Constitutional and vital signs reviewed.      All other systems reviewed and negative except as noted above.    Physical Exam     ED Triage Vitals   BP 05/07/24 1509 127/64   Pulse 05/07/24 1509 69   Resp 05/07/24 1509 18   Temp 05/07/24 1511 98.3 °F (36.8 °C)   Temp src 05/07/24 1511 Oral   SpO2 05/07/24 1509 99 %   O2 Device 05/07/24 1857 None (Room air)       Current Vitals:   Vital Signs  BP: 120/64  Pulse: 58  Resp: 18  Temp: 98.1 °F (36.7 °C)  Temp src: Oral    Oxygen Therapy  SpO2: 98 %  O2 Device: None (Room air)            Physical Exam    General:  Vitals as listed.  No acute distress   HEENT: Sclerae anicteric.  Conjunctivae show no pallor.  Oropharynx clear, mucous membranes moist   Neck: supple, no rigidity   Lungs: good air exchange and clear   Heart: regular rate rhythm and no murmur   Abdomen: Soft and nontender.  No abdominal masses.  No peritoneal signs   Extremities: no edema, normal peripheral pulses   Neuro: Alert oriented and nonfocal   Skin: no rashes or nodules    ED Course     Labs Reviewed   COMP METABOLIC PANEL (14) - Abnormal; Notable for the following components:       Result Value    Alkaline Phosphatase 44 (*)     All other components within normal limits   TROPONIN I HIGH SENSITIVITY - Abnormal; Notable for the following components:    Troponin I (High Sensitivity) 169 (*)     All other components within normal limits   LIPID PANEL - Abnormal; Notable for the following components:    Cholesterol, Total 201 (*)     Triglycerides 177 (*)     LDL Cholesterol 118 (*)     VLDL 31 (*)     Non HDL Chol 149 (*)     All other components within normal limits   PTT, ACTIVATED - Abnormal; Notable for the following components:    PTT 60.3 (*)     All other  components within normal limits   PROTHROMBIN TIME (PT) - Abnormal; Notable for the following components:    PT 23.5 (*)     INR 2.06 (*)     All other components within normal limits   D-DIMER - Normal   CBC WITH DIFFERENTIAL WITH PLATELET    Narrative:     The following orders were created for panel order CBC With Differential With Platelet.  Procedure                               Abnormality         Status                     ---------                               -----------         ------                     CBC W/ DIFFERENTIAL[[id number]]                              Final result                 Please view results for these tests on the individual orders.   PTT, ACTIVATED   TROPONIN I HIGH SENSITIVITY   BASIC METABOLIC PANEL (8)   CBC, PLATELET; NO DIFFERENTIAL   RAINBOW DRAW LAVENDER   RAINBOW DRAW LIGHT GREEN   RAINBOW DRAW BLUE   CBC W/ DIFFERENTIAL     EKG    Rate, intervals and axes as noted on EKG Report.  Rate: 65  Rhythm: Sinus Rhythm  Reading: No evidence of acute ischemia               XR CHEST AP PORTABLE  (CPT=71045)    Result Date: 5/7/2024  PROCEDURE:  XR CHEST AP PORTABLE  (CPT=71045)  TECHNIQUE:  AP chest radiograph was obtained.  COMPARISON:  External Exams, XR, XR CHEST PA + LAT CHEST (EWA=94545), 1/03/2023, 5:22 PM.  INDICATIONS:  Pt with intermittent chest pain for a couple of weeks  PATIENT STATED HISTORY: (As transcribed by Technologist)  Mid chest pain for past 2 weeks off and on.    FINDINGS:  Lungs and pleural spaces are clear.  There is some thickening of the right paratracheal stripe.  This is probably related to great vessel tortuosity and is in retrospect stable as compared to prior study.  Heart size is within normal limits.            CONCLUSION:  Widening of the mediastinum is probably related to great vessel tortuosity and is not significantly changed.  There is otherwise no acute abnormality.   LOCATION:  [place]      Dictated by (CST): Mitchell Chacon MD on 5/07/2024 at 5:50  PM     Finalized by (CST): Mitchell Chacon MD on 5/07/2024 at 5:51 PM                 MDM      63-year-old male presents reporting exertional chest pain over the last week or so and today he is now having a mild version of the same pain at rest.    Additional history obtained by patient's wife who reports that the patient just told her about the pain today.  He had a previous stress test years ago and it was a chemical stress test.    Differential includes but is not limited to exertional chest pain, pulmonary embolism, cardiac ischemia, a life threat.    CBC, CMP, troponin, D-dimer, EKG ordered for further evaluation.    My independent interpretation of chest x-ray is that there is no pneumothorax    Laboratory evaluation shows an abnormal cardiac enzyme with troponin elevated at 169.  D-dimer is negative and does not suggest pulmonary embolism.  Patient reporting previous exertional chest pain that is now constant, though resolved at this time.  Raises concern for unstable angina.  Heparin initiated.  Patient is on Coumadin.  INR returned at 2.06.  Discussed with admitting physician who agrees with plan for heparinization.  Case was also discussed with [facility] who agrees with plan for hospitalization and will evaluate at [facility].  Patient agrees to plan for hospitalization.  Await transport      Critical CARE time:  A total of 40 minutes of critical care time (exclusive of billable procedures) was administered to manage the patient's cardiovascular instability due to his unstable angina requiring IV heparin and hospitalization for further management.  This involved direct patient intervention, complex decision making, and/or extensive discussions with the patient, family, and clinical staff.        Admission disposition: 5/7/2024  3:48 PM                                        Medical Decision Making      Disposition and Plan     Clinical Impression:  1. Acute chest pain    2. Troponin level elevated    3.  Unstable angina (HCC)         Disposition:  Admit  5/7/2024  3:48 pm    Follow-up:  No follow-up provider specified.        Medications Prescribed:  Current Discharge Medication List                            Hospital Problems       Present on Admission  Date Reviewed: 10/10/2023            ICD-10-CM Noted POA    * (Principal) Acute chest pain R07.9 5/7/2024 Unknown    Troponin level elevated R79.89 5/7/2024 Unknown    Unstable angina (HCC) I20.0 5/7/2024 Unknown

## 2024-05-07 NOTE — ED QUICK NOTES
Orders for admission, patient is aware of plan and ready to go upstairs. Any questions, please call ED RN nathaniel at extension [phone].     Patient Covid vaccination status: Fully vaccinated     COVID Test Ordered in ED: None    COVID Suspicion at Admission: N/A    Running Infusions:    continuous dose heparin          Mental Status/LOC at time of transport: alert    Other pertinent information:   CIWA score: N/A   NIH score:  N/A

## 2024-05-07 NOTE — ED INITIAL ASSESSMENT (HPI)
Pt has had intermittent chest pain for a couple weeks and today constant. Lighter pain r axilla radiates to back. No recent illness. No travel. Hx of blood clots

## 2024-05-08 ENCOUNTER — APPOINTMENT (OUTPATIENT)
Dept: CV DIAGNOSTICS | Facility: HOSPITAL | Age: 64
End: 2024-05-08
Attending: HOSPITALIST

## 2024-05-08 ENCOUNTER — APPOINTMENT (OUTPATIENT)
Dept: INTERVENTIONAL RADIOLOGY/VASCULAR | Facility: HOSPITAL | Age: 64
End: 2024-05-08
Attending: NURSE PRACTITIONER

## 2024-05-08 PROBLEM — D68.59 HYPERCOAGULABLE STATE (HCC): Status: ACTIVE | Noted: 2024-05-08

## 2024-05-08 PROBLEM — I25.10 CORONARY ARTERY DISEASE: Status: ACTIVE | Noted: 2024-05-08

## 2024-05-08 LAB
ANION GAP SERPL CALC-SCNC: 4 MMOL/L (ref 0–18)
APTT PPP: 52.1 SECONDS (ref 23–36)
APTT PPP: 82.3 SECONDS (ref 23–36)
BUN BLD-MCNC: 12 MG/DL (ref 9–23)
CALCIUM BLD-MCNC: 8.9 MG/DL (ref 8.5–10.1)
CHLORIDE SERPL-SCNC: 111 MMOL/L (ref 98–112)
CO2 SERPL-SCNC: 25 MMOL/L (ref 21–32)
CREAT BLD-MCNC: 0.98 MG/DL
EGFRCR SERPLBLD CKD-EPI 2021: 86 ML/MIN/1.73M2 (ref 60–?)
ERYTHROCYTE [DISTWIDTH] IN BLOOD BY AUTOMATED COUNT: 12.9 %
GLUCOSE BLD-MCNC: 105 MG/DL (ref 70–99)
HCT VFR BLD AUTO: 37.7 %
HGB BLD-MCNC: 13 G/DL
INR BLD: 2.06 (ref 0.8–1.2)
ISTAT ACTIVATED CLOTTING TIME: 196 SECONDS (ref 74–137)
MCH RBC QN AUTO: 32.7 PG (ref 26–34)
MCHC RBC AUTO-ENTMCNC: 34.5 G/DL (ref 31–37)
MCV RBC AUTO: 95 FL
OSMOLALITY SERPL CALC.SUM OF ELEC: 290 MOSM/KG (ref 275–295)
PLATELET # BLD AUTO: 208 10(3)UL (ref 150–450)
PLATELET # BLD AUTO: 208 10(3)UL (ref 150–450)
POTASSIUM SERPL-SCNC: 4 MMOL/L (ref 3.5–5.1)
POTASSIUM SERPL-SCNC: 4 MMOL/L (ref 3.5–5.1)
PROTHROMBIN TIME: 23.5 SECONDS (ref 11.6–14.8)
RBC # BLD AUTO: 3.97 X10(6)UL
SODIUM SERPL-SCNC: 140 MMOL/L (ref 136–145)
TROPONIN I SERPL HS-MCNC: 59 NG/L
WBC # BLD AUTO: 4.3 X10(3) UL (ref 4–11)

## 2024-05-08 PROCEDURE — B2111ZZ FLUOROSCOPY OF MULTIPLE CORONARY ARTERIES USING LOW OSMOLAR CONTRAST: ICD-10-PCS | Performed by: INTERNAL MEDICINE

## 2024-05-08 PROCEDURE — 93306 TTE W/DOPPLER COMPLETE: CPT | Performed by: HOSPITALIST

## 2024-05-08 PROCEDURE — 99232 SBSQ HOSP IP/OBS MODERATE 35: CPT | Performed by: STUDENT IN AN ORGANIZED HEALTH CARE EDUCATION/TRAINING PROGRAM

## 2024-05-08 PROCEDURE — B2151ZZ FLUOROSCOPY OF LEFT HEART USING LOW OSMOLAR CONTRAST: ICD-10-PCS | Performed by: INTERNAL MEDICINE

## 2024-05-08 PROCEDURE — 4A023N7 MEASUREMENT OF CARDIAC SAMPLING AND PRESSURE, LEFT HEART, PERCUTANEOUS APPROACH: ICD-10-PCS | Performed by: INTERNAL MEDICINE

## 2024-05-08 RX ORDER — ASPIRIN 81 MG/1
81 TABLET ORAL DAILY
Status: DISCONTINUED | OUTPATIENT
Start: 2024-05-09 | End: 2024-05-09

## 2024-05-08 RX ORDER — WARFARIN SODIUM 2.5 MG/1
2.5 TABLET ORAL ONCE
Status: COMPLETED | OUTPATIENT
Start: 2024-05-08 | End: 2024-05-08

## 2024-05-08 RX ORDER — SODIUM CHLORIDE 9 MG/ML
INJECTION, SOLUTION INTRAVENOUS CONTINUOUS
Status: ACTIVE | OUTPATIENT
Start: 2024-05-08 | End: 2024-05-09

## 2024-05-08 RX ORDER — LIDOCAINE HYDROCHLORIDE 10 MG/ML
INJECTION, SOLUTION EPIDURAL; INFILTRATION; INTRACAUDAL; PERINEURAL
Status: COMPLETED
Start: 2024-05-08 | End: 2024-05-08

## 2024-05-08 RX ORDER — SODIUM CHLORIDE 9 MG/ML
INJECTION, SOLUTION INTRAVENOUS
Status: DISCONTINUED | OUTPATIENT
Start: 2024-05-09 | End: 2024-05-08 | Stop reason: HOSPADM

## 2024-05-08 RX ORDER — WARFARIN SODIUM 5 MG/1
5 TABLET ORAL
Status: DISCONTINUED | OUTPATIENT
Start: 2024-05-08 | End: 2024-05-08

## 2024-05-08 RX ORDER — MIDAZOLAM HYDROCHLORIDE 1 MG/ML
INJECTION INTRAMUSCULAR; INTRAVENOUS
Status: COMPLETED
Start: 2024-05-08 | End: 2024-05-08

## 2024-05-08 RX ORDER — ISOSORBIDE MONONITRATE 30 MG/1
15 TABLET, EXTENDED RELEASE ORAL EVERY EVENING
Status: DISCONTINUED | OUTPATIENT
Start: 2024-05-08 | End: 2024-05-09

## 2024-05-08 RX ORDER — HEPARIN SODIUM 5000 [USP'U]/ML
INJECTION, SOLUTION INTRAVENOUS; SUBCUTANEOUS
Status: COMPLETED
Start: 2024-05-08 | End: 2024-05-08

## 2024-05-08 RX ORDER — ROSUVASTATIN CALCIUM 10 MG/1
10 TABLET, COATED ORAL NIGHTLY
Status: DISCONTINUED | OUTPATIENT
Start: 2024-05-08 | End: 2024-05-09

## 2024-05-08 NOTE — BRIEF PROCEDURE NOTE
Procedure was dictated.    Procedure performed:  -Coronary angiography  -LV gram    Indications:  -Unstable angina  -Borderline elevation troponin and normalized  -Dyslipidemia  -Obesity BMI of 36  -History of PE and DVT November 2015  -Chronic anticoagulation with warfarin -D-dimer negative this time    Selective coronary angiography findings:  -Mild mid LAD disease and mild proximal diagonal branch disease otherwise normal left coronary arterial system angiographically  -Small to medium size circumflex system    -RCA -100% proximal chronic total occlusion related to takeoff of small RV branch -chronic with no thrombus -no acute lesion    -Left-to-right collaterals from mid and distal LAD via septal branches and distal apical LAD filling the distal RCA system up to mid RCA     Total length of RCA  approximately 25 to 30 mm long.    LVEF 55 to 60% with distal inferior wall hypokinesis.  No MR angiographically and no thoracic aorta aneurysm.  No pressure gradient across aortic valve.  Successful hemostasis of right femoral artery with 6 [place] Angio-Seal closure device.      No hematoma no complications.    Plan:  -Adjust cardiac medications and discharge home in a.m. and see if he can tolerate them overnight not a good candidate for beta-blocker  -Resume Coumadin tonight if no right groin issues after procedure  -Check CMP and INR tomorrow morning  -Right leg straight for 2 hours  -Discontinue IV heparin drip  -Patient will need RCA  PCI and we will set it up with Dr. Gilbert in couple weeks -week of May 20 -will be arranged for Cath Lab and [facility] office  -Patient may return to work on Tuesday, May 14, 2024-he will need it to be written on the paper when discharged  -Weight lift limit 15 pounds till RCA is fixed  -Start low-dose of statin rosuvastatin 10 mg p.o. daily  -Continue gemfibrozil  -Start low-dose of Imdur 15 mg p.o. nightly  -Lower dose of aspirin to baby aspirin daily 81 mg since patient is on  Coumadin/warfarin  -Not a good candidate for beta-blocker with heart rate in 50s  -Discharge home tomorrow and will bring the patient for PCI of RCA  in couple weeks outpatient -will be done in a retrograde fashion based on RCA anatomy    All was discussed with the patient, his wife and his sister as well as nursing staff.    Rocky Burgos MD [facility] cardiology  May 8, 2024  2:29 PM

## 2024-05-08 NOTE — PLAN OF CARE
Assumed care of patient at 1930. Patient is alert and orientated x4. Currently room air. Lung sounds clear. Continuous pulse ox maintained. NSR on tele. Continent of bowel and bladder. No complaints of pain. Up ad amy. Heparin drip running according to protocol. Call light within reach. Fall precautions in place.    Plan of care:  NPO  Heparin drip  Echo  Cards to see    Problem: Patient/Family Goals  Goal: Patient/Family Long Term Goal  Description: Patient's Long Term Goal: to prevent readmission    Interventions:  - take medications as prescribed  - See additional Care Plan goals for specific interventions  Outcome: Progressing  Goal: Patient/Family Short Term Goal  Description: Patient's Short Term Goal: to be discharged    Interventions:   - Echo  - Heparin drip  - See additional Care Plan goals for specific interventions  Outcome: Progressing     Problem: CARDIOVASCULAR - ADULT  Goal: Maintains optimal cardiac output and hemodynamic stability  Description: INTERVENTIONS:  - Monitor vital signs, rhythm, and trends  - Monitor for bleeding, hypotension and signs of decreased cardiac output  - Evaluate effectiveness of vasoactive medications to optimize hemodynamic stability  - Monitor arterial and/or venous puncture sites for bleeding and/or hematoma  - Assess quality of pulses, skin color and temperature  - Assess for signs of decreased coronary artery perfusion - ex. Angina  - Evaluate fluid balance, assess for edema, trend weights  Outcome: Progressing  Goal: Absence of cardiac arrhythmias or at baseline  Description: INTERVENTIONS:  - Continuous cardiac monitoring, monitor vital signs, obtain 12 lead EKG if indicated  - Evaluate effectiveness of antiarrhythmic and heart rate control medications as ordered  - Initiate emergency measures for life threatening arrhythmias  - Monitor electrolytes and administer replacement therapy as ordered  Outcome: Progressing

## 2024-05-08 NOTE — PROGRESS NOTES
05/07/24 1857 05/07/24 1859 05/07/24 1903   Vital Signs   Temp 98.1 °F (36.7 °C)  --   --    Temp src Oral  --   --    Cardiac Rhythm NSR NSR  --    Resp 18 18  --    BP Location Left arm Left arm Left arm   BP Method Automatic Automatic Automatic   Patient Position Lying Sitting Standing   Oxygen Therapy   O2 Device None (Room air) None (Room air) None (Room air)     Admission orthostatic bp negative

## 2024-05-08 NOTE — CONSULTS
[facility]  Report of Consultation    Mitchell Quintanilla Patient Status:  Observation    10/16/1960 MRN [id number]   Location [facility] 8NE-A Attending Tone Chauhan MD   Hosp Day # 0 PCP Willie Hanna MD     Reason for Consultation:  Chest pain  Elevated troponin      History of Present Illness:  Mitchell Quintanilla is a a(n) 63 year old male with presented with couple week intermittent chest pain which became more constant and he came to emergency room.  Pain was radiating to the back near the right axilla.  He also reported some achiness in midsternal area on on the right side.  Usually not related to exertion but had to stop a couple times when he was cutting grass.  Normal blood pressure with no tachycardia but mild bradycardia.  No hypoxia.  He denies any recent travel.    no prior cardiac history but history of PE and DVT on chronic anticoagulation with warfarin, obesity with BMI of 36, dyslipidemia, spinal arthritis with history of back surgery and peripheral neuropathy.    Telemetry: Stable sinus    EKG: Unremarkable, sinus 61 bpm with no acute ischemia or pathological Q waves.  Minor nonspecific changes.  Normal intervals and normal axis.    Echo -2015 -LVEF 55 to 60% with preserved RV function with no significant valvular abnormalities.    I reviewed chest x-ray images myself -no acute changes.    Stress Test -negative nuclear stress test for ischemia -2012.    No prior cardiac procedures.    Important labs: Glucose 105 potassium 4.0 creatinine 0.98 and normal liver enzymes.  Total cholesterol 201 triglycerides 177  HDL 52.  Troponin 169 then 119.  INR 2.06 and negative D-dimer and CBC unremarkable.    History:  Past Medical History:    Arthritis    Bronchitis, not specified as acute or chronic    Cough    Current use of long term anticoagulation    DVT (deep venous thrombosis) (HCC)    Gastric reflux    Hernia of unspecified site of abdominal cavity without  mention of obstruction or gangrene    Hyperlipidemia    Obesity    PE (pulmonary embolism)    Screening for malignant neoplasm of colon     Past Surgical History:   Procedure Laterality Date    Back surgery      Knee arthroscp harv  2/09    Tonsillectomy  61    Vasectomy  89     Family History   Problem Relation Age of Onset    Other (colon cancer) Father     Other (diabetes mellitus) Father     Cancer Father     Diabetes Father     Hypertension Mother        Social History:   reports that he has never smoked. He has never used smokeless tobacco. He reports that he does not drink alcohol and does not use drugs.    Allergies:  Allergies   Allergen Reactions    Darvocet [Propoxyphene N-Apap] NAUSEA ONLY     A500 TABS; abdominal pain         Medications:    Current Facility-Administered Medications:     heparin (Porcine) 30916 units/250mL infusion ACS/AFIB CONTINUOUS, 200-3,000 Units/hr, Intravenous, Continuous    gemfibrozil (Lopid) tab 600 mg, 600 mg, Oral, BID AC    pantoprazole (Protonix) DR tab 40 mg, 40 mg, Oral, Daily    acetaminophen (Tylenol Extra Strength) tab 1,000 mg, 1,000 mg, Oral, Q4H PRN    melatonin tab 3 mg, 3 mg, Oral, Nightly PRN    glycerin-hypromellose- (Artificial Tears) 0.2-0.2-1 % ophthalmic solution 1 drop, 1 drop, Both Eyes, QID PRN    sodium chloride (Saline Mist) 0.65 % nasal solution 1 spray, 1 spray, Each Nare, Q3H PRN    ondansetron (Zofran) 4 MG/2ML injection 4 mg, 4 mg, Intravenous, Q6H PRN    metoclopramide (Reglan) 5 mg/mL injection 10 mg, 10 mg, Intravenous, Q8H PRN    polyethylene glycol (PEG 3350) (Miralax) 17 g oral packet 17 g, 17 g, Oral, Daily PRN    sennosides (Senokot) tab 17.2 mg, 17.2 mg, Oral, Nightly PRN    bisacodyl (Dulcolax) 10 MG rectal suppository 10 mg, 10 mg, Rectal, Daily PRN    fleet enema (Fleet) 7-19 GM/118ML rectal enema 133 mL, 1 enema, Rectal, Once PRN    aspirin tab 325 mg, 325 mg, Oral, Daily    nitroglycerin (Nitrostat) SL tab 0.4 mg, 0.4 mg,  Sublingual, Q5 Min PRN    Review of Systems:     Constitutional: Negative for fever or chills. No significant weight changes.  Eyes: Patient denies significant visual changes.  Ears, Nose, Mouth, Throat:  Negative for any new hearing loss. No sore throat. No nasal congestion.  Cardiovascular: see HPI -history of PE and DVT.  On Coumadin.  Intermittent chest pain x 2 weeks.  Respiratory: No cough, wheezing or hemoptysis, no dyspnea.  Hematologic/Lymphatic: No easy bruising or bleeding.   Integumentary: No rash or suspicious lesions on the skin.   Musculoskeletal: Osteoarthritis.  Gastrointestinal: Negative for any bleeding. No abdominal pain. No diarrhea.  Genitourinary: No hematuria.   Neurological: Alert and oriented, no headaches, no focal weakness or new paresthesias.  Allergic/Immunologic: no rhinitis or environmental allergies      Physical Exam:  Blood pressure 118/78, pulse 55, temperature 97.7 °F (36.5 °C), temperature source Oral, resp. rate 18, height 71\", weight 260 lb 9.3 oz (118.2 kg), SpO2 98%.  Temp (24hrs), Av.1 °F (36.7 °C), Min:97.7 °F (36.5 °C), Max:98.5 °F (36.9 °C)    Wt Readings from Last 3 Encounters:   24 260 lb 9.3 oz (118.2 kg)   10/10/23 263 lb (119.3 kg)   23 254 lb (115.2 kg)       Constitutional: Normal appearance. No apparent distress.  Obesity.  Eyes: Moist conjunctivae, PERRLA.  Ears, Nose, Mouth, Throat:  Moist mucosa. Anicteric sclera. Oropharynx clear.  Head and Neck: No JVD, carotids 2+ no bruits. Neck supple. No thyromegaly or adenopathy. Normocephalic head.  Cardiovascular: Regular rate and rhythm, S1, S2 normal, no murmur, rub or gallop.  Respiratory: Clear lungs without wheezes, rales, rhonchi or dullness.  Normal excursions and effort.  Gastrointestinal: Soft, non-tender abdomen.   Extremities: Without clubbing, cyanosis or edema.  Peripheral pulses are 2+.  Neurologic: Alert and oriented x3.  Cranial nerves intact. Normal sensation and motor function. No  focal deficits.  Musculoskeletal: normal range of motion, normal muscle strength, no joint effusion.   Integumentary: Warm and dry skin. No rashes.  Psychiatric: no depression. Normal affect.     Laboratories and Data:    Imaging:  XR CHEST AP PORTABLE  (CPT=71045)    Result Date: 5/7/2024  PROCEDURE:  XR CHEST AP PORTABLE  (CPT=71045)  TECHNIQUE:  AP chest radiograph was obtained.  COMPARISON:  External Exams, XR, XR CHEST PA + LAT CHEST (XEA=50063), 1/03/2023, 5:22 PM.  INDICATIONS:  Pt with intermittent chest pain for a couple of weeks  PATIENT STATED HISTORY: (As transcribed by Technologist)  Mid chest pain for past 2 weeks off and on.    FINDINGS:  Lungs and pleural spaces are clear.  There is some thickening of the right paratracheal stripe.  This is probably related to great vessel tortuosity and is in retrospect stable as compared to prior study.  Heart size is within normal limits.            CONCLUSION:  Widening of the mediastinum is probably related to great vessel tortuosity and is not significantly changed.  There is otherwise no acute abnormality.         Labs:     Lab Results   Component Value Date    INR 2.06 (H) 05/08/2024    INR 2.06 (H) 05/07/2024    INR 2.30 (A) 05/06/2024     Lab Results   Component Value Date     05/07/2024    HDL 52 05/07/2024    TRIG 177 05/07/2024    VLDL 31 05/07/2024     Lab Results   Component Value Date    WBC 4.3 05/08/2024    HGB 13.0 05/08/2024    HCT 37.7 05/08/2024    .0 05/08/2024    .0 05/08/2024    CREATSERUM 0.98 05/08/2024    BUN 12 05/08/2024     05/08/2024    K 4.0 05/08/2024    K 4.0 05/08/2024     05/08/2024    CO2 25.0 05/08/2024     05/08/2024    CA 8.9 05/08/2024    ALB 4.0 05/07/2024    ALKPHO 44 05/07/2024    BILT 0.4 05/07/2024    TP 7.7 05/07/2024    AST 21 05/07/2024    ALT 26 05/07/2024    PTT 82.3 05/08/2024    INR 2.06 05/08/2024    PTP 23.5 05/08/2024    DDIMER <0.27 05/07/2024     Impression:  Chest  pain, intermittent, x 2 weeks.  Unstable angina.  Elevated troponin level -169 that trended down to 116 and then negative.  History of bilateral PE and DVT November 2015, unprovoked -followed by Dr. Smith from heme-onc outpatient.  D-dimer negative this time.  Chronic anticoagulation with warfarin.  INR 2.0.  Hyperlipidemia on gemfibrozil - .  GERD on PPI.  Spine, bilateral knee and other joint arthritis with chronic back pain and low back surgery.  Lumbar radiculitis/radiculopathy.  Obesity BMI of 36.    Plan:  -Telemetry monitoring  -Echo Doppler to assess LV systolic function and for any wall motion abnormalities  -Holding Coumadin INR 2.0  -Aspirin 325  -IV heparin for anticoagulation started in ER  -Recommend coronary angiography rather than stress test despite normalization of troponin level with a good story    Patient was consented. The risks and benefits of the procedure were explained to the patient. 1-2% risk of coronary angiography, possible angioplasty, include, but are not limited to stroke, death, heart attack, coronary dissection requiring emergent CABG, hematoma, bleeding, allergic reaction to medications, vascular damage requiring surgical repair, kidney failure requiring dialysis and others. Patient wishes to proceed.    Discussed with the patient, his wife and nursing staff    Rocky Burgos M.D., F.A.C.C.  [facility]    5/8/2024  8:27 AM  C5

## 2024-05-08 NOTE — PLAN OF CARE
Assumed care at 0730. Pt is A&Ox4. Pt is on RA lung sounds clear. NSR on tele, VSS. No complaints of cardiac symptoms. Continent of B&B. Denies pain at this time. Up independently, tolerating well. Plan of care reviewed with patient, verbalizes understanding, all needs addressed at this time. Bed in lowest position and locked. Call light at bedside.    POC:  ECHO  Angiogram today      Problem: CARDIOVASCULAR - ADULT  Goal: Maintains optimal cardiac output and hemodynamic stability  Description: INTERVENTIONS:  - Monitor vital signs, rhythm, and trends  - Monitor for bleeding, hypotension and signs of decreased cardiac output  - Evaluate effectiveness of vasoactive medications to optimize hemodynamic stability  - Monitor arterial and/or venous puncture sites for bleeding and/or hematoma  - Assess quality of pulses, skin color and temperature  - Assess for signs of decreased coronary artery perfusion - ex. Angina  - Evaluate fluid balance, assess for edema, trend weights  Outcome: Progressing  Goal: Absence of cardiac arrhythmias or at baseline  Description: INTERVENTIONS:  - Continuous cardiac monitoring, monitor vital signs, obtain 12 lead EKG if indicated  - Evaluate effectiveness of antiarrhythmic and heart rate control medications as ordered  - Initiate emergency measures for life threatening arrhythmias  - Monitor electrolytes and administer replacement therapy as ordered  Outcome: Progressing     Problem: Patient/Family Goals  Goal: Patient/Family Long Term Goal  Description: Patient's Long Term Goal: to prevent readmission    Interventions:  - take medications as prescribed  - See additional Care Plan goals for specific interventions  Outcome: Progressing  Goal: Patient/Family Short Term Goal  Description: Patient's Short Term Goal: to be discharged    Interventions:   - Echo  - Heparin drip  - See additional Care Plan goals for specific interventions  Outcome: Progressing

## 2024-05-08 NOTE — H&P
[facility]     Mitchell Quintanilla Patient Status:  Observation    10/16/1960 MRN [id number]   Location [facility] 8NE-A Attending Tone Chauhan MD   Hosp Day # 0 PCP Willie Hanna MD     Chief Complaint:   Chief Complaint   Patient presents with    Chest Pain       Subjective:    History of Present Illness:     Mitchell Quintanilla is a 63 year old male with a past medical history of DVT on Coumadin, gastric reflux, hyperlipidemia.  He comes emergency room now secondary to chest pain.  He states that he has had an aching sensation in the midsternal region.  This has been ongoing for the past week or so.  He states that it worsens with exertion.  He was cutting grass earlier and had to stop due to his symptoms.  In the emergency room the patient's troponin is elevated.    History/Other:    Past Medical History:  Past Medical History:    Arthritis    Bronchitis, not specified as acute or chronic    Cough    Current use of long term anticoagulation    DVT (deep venous thrombosis) (HCC)    Gastric reflux    Hernia of unspecified site of abdominal cavity without mention of obstruction or gangrene    Hyperlipidemia    Obesity    PE (pulmonary embolism)    Screening for malignant neoplasm of colon     Past Surgical History:   Past Surgical History:   Procedure Laterality Date    Back surgery      Knee arthroscp harv      Tonsillectomy  61    Vasectomy  89      Family History:   Family History   Problem Relation Age of Onset    Other (colon cancer) Father     Other (diabetes mellitus) Father     Cancer Father     Diabetes Father     Hypertension Mother      Social History:    reports that he has never smoked. He has never used smokeless tobacco. He reports that he does not drink alcohol and does not use drugs.     Allergies:   Allergies   Allergen Reactions    Darvocet [Propoxyphene N-Apap] NAUSEA ONLY     A500 TABS; abdominal pain         Medications:    No current facility-administered  medications on file prior to encounter.     Current Outpatient Medications on File Prior to Encounter   Medication Sig Dispense Refill    Multiple Vitamin (MULTI-VITAMIN DAILY) Oral Tab Take 1 tablet by mouth daily.      Vitamin C 500 MG Oral Tab Take 1 tablet (500 mg total) by mouth in the morning and 1 tablet (500 mg total) before bedtime.      gemfibrozil 600 MG Oral Tab Take 1 tablet (600 mg total) by mouth 2 (two) times daily before meals. 180 tablet 1    pantoprazole 40 MG Oral Tab EC Take 1 tablet (40 mg total) by mouth daily. 90 tablet 2    warfarin 5 MG Oral Tab TAKE ONE and ONE-HALF TABLET BY MOUTH DAILY AS DIRECTED (Patient taking differently: TAKE 5 mg on fridays and 2.5 mg every day except fridays) 135 tablet 0    Acetaminophen (ARTHRITIS PAIN OR) Take by mouth.      Cholecalciferol (VITAMIN D3) 2000 units Oral Tab Take by mouth.         Review of Systems:   A comprehensive review of systems was completed.    Pertinent positives and negatives noted in the HPI.    Objective:   Physical Exam:    /64   Pulse 58   Temp 98.1 °F (36.7 °C) (Oral)   Resp 18   Ht 5' 11\" (1.803 m)   Wt 260 lb 9.3 oz (118.2 kg)   SpO2 98%   BMI 36.34 kg/m²   General: No acute distress, Alert  Respiratory: No rhonchi, no wheezes  Cardiovascular: S1, S2.   Abdomen: Soft, Non-tender, Non-distended, Positive bowel sounds  Neuro: No new focal deficits  Extremities: No edema      Results:    Labs:      Labs Last 24 Hours:  Recent Labs   Lab 05/06/24  0000 05/07/24  1522 05/07/24  1523   WBC  --  5.2  --    HGB  --  13.5  --    MCV  --  95.0  --    PLT  --  224.0  --    INR 2.30*  --  2.06*       Recent Labs   Lab 05/07/24  1523   GLU 89   BUN 13   CREATSERUM 1.16   CA 9.6   ALB 4.0      K 3.7      CO2 28.0   ALKPHO 44*   AST 21   ALT 26   BILT 0.4   TP 7.7       Estimated Creatinine Clearance: 67.6 mL/min (based on SCr of 1.16 mg/dL).    Recent Labs   Lab 05/07/24  1523 05/07/24  1904   TROPHS 169* 119*        Recent Labs   Lab 05/06/24  0000 05/07/24  1523   PTP  --  23.5*   INR 2.30* 2.06*       No results for input(s): \"TROP\", \"CK\" in the last 168 hours.      Imaging: Imaging data reviewed in Epic.    Assessment & Plan:      # Chest pain with possible NSTEMI  Hold coumadin  Heparin drip  Currently chest pain-free; start nitro if symptoms return  Trend troponin  Stress test versus angiogram  ASA  Echo  EKG: NSR      # PE/DVT  Hold home Coumadin    # Hyperlipidemia  Lopid            Plan of care discussed with ED physician    Tone Chauhan MD    Supplementary Documentation:     The 21st Century Cures Act makes medical notes like these available to patients in the interest of transparency. Please be advised this is a medical document. Medical documents are intended to carry relevant information, facts as evident, and the clinical opinion of the practitioner. The medical note is intended as peer to peer communication and may appear blunt or direct. It is written in medical language and may contain abbreviations or verbiage that are unfamiliar.

## 2024-05-08 NOTE — PROGRESS NOTES
[facility]   part of [facility]     Hospitalist Progress Note     Mitchell Quintanilla Patient Status:  Observation    10/16/1960 MRN [id number]   Location [facility] 8NE-A Attending oTne Chauhan MD   Hosp Day # 0 PCP Willie Hanna MD     Chief Complaint: Chest pain    Subjective:      - Pt seen post-cath, feeling well, denies cp, sob, abd pain, n/v    Objective:    Review of Systems:   A comprehensive review of systems was completed; pertinent positive and negatives stated in subjective.    Vital signs:  Temp:  [98 °F (36.7 °C)-98.5 °F (36.9 °C)] 98 °F (36.7 °C)  Pulse:  [54-71] 54  Resp:  [16-18] 18  BP: (120-127)/(64-80) 127/80  SpO2:  [98 %-99 %] 98 %    Physical Exam:    General: No acute distress  Respiratory: no wheezes, no rhonchi  Cardiovascular: S1, S2, regular rate and rhythm  Abdomen: Soft, Non-tender, non-distended, positive bowel sounds. Right groin site soft, without surrounding bruising or swelling   Neuro: No new focal deficits.   Extremities: no edema    Diagnostic Data:    Labs:  Recent Labs   Lab 24  0000 24  1522 24  1523 24  0534   WBC  --  5.2  --  4.3   HGB  --  13.5  --  13.0   MCV  --  95.0  --  95.0   PLT  --  224.0  --  208.0  208.0   INR 2.30*  --  2.06* 2.06*       Recent Labs   Lab 24  1523 24  0534   GLU 89 105*   BUN 13 12   CREATSERUM 1.16 0.98   CA 9.6 8.9   ALB 4.0  --     140   K 3.7 4.0  4.0    111   CO2 28.0 25.0   ALKPHO 44*  --    AST 21  --    ALT 26  --    BILT 0.4  --    TP 7.7  --        Estimated Creatinine Clearance: 80 mL/min (based on SCr of 0.98 mg/dL).    Recent Labs   Lab 24  1523 24  1904 24  0534   TROPHS 169* 119* 59       Recent Labs   Lab 24  0000 24  1523 24  0534   PTP  --  23.5* 23.5*   INR 2.30* 2.06* 2.06*                  Microbiology    No results found for this visit on 24.      Imaging: Reviewed in Epic.    Medications:    gemfibrozil  600 mg Oral  BID AC    pantoprazole  40 mg Oral Daily    aspirin  325 mg Oral Daily       Assessment & Plan:      # CAD   - S/p C with  RCA,, mild mild LAD disease    - Plan for outpt PCI with cardiology for RCA    - ASA, statin, warfarin   - TTE with EF 60-65%, no RWMA, no other significant abnormalities noted    # Hypercoagulable state     - continue warfarin    # Hyperlipidemia - continue home statin, gemfibrozil     Placido Aranda MD    Supplementary Documentation:     Quality:  DVT Mechanical Prophylaxis:     Early ambuation  DVT Pharmacologic Prophylaxis   Medication    heparin (Porcine) 09433 units/250mL infusion ACS/AFIB CONTINUOUS    DVT Pharmacologic prophylaxis: Aspirin 325 mg           Code Status: Full Code  Carrera: No urinary catheter in place  Carrera Duration (in days):   Central line:    PRECIOUS:     The 21st Century Cures Act makes medical notes like these available to patients in the interest of transparency. Please be advised this is a medical document. Medical documents are intended to carry relevant information, facts as evident, and the clinical opinion of the practitioner. The medical note is intended as peer to peer communication and may appear blunt or direct. It is written in medical language and may contain abbreviations or verbiage that are unfamiliar.

## 2024-05-08 NOTE — PLAN OF CARE
Patient went to [facility] with chest pain radiating to neck. Hx pe/dvt 2015 and is on coumadin. NSR.AOx3, ra, lungs clear, positive bowel sounds, bm today. No edema.  Trop 169. Another drawn arrival to the floor.  Heparin gtt at 10 ml/hr (afib/acs).  Admission database complete. K 3.7.   Dinner ordered.    2d echo in am.  Cards APN notified of consult.  Problem: Patient/Family Goals  Goal: Patient/Family Long Term Goal  Description: Patient's Long Term Goal: Chest Pain free    Interventions:  - trop  - See additional Care Plan goals for specific interventions  Outcome: Progressing  Goal: Patient/Family Short Term Goal  Description: Patient's Short Term Goal: hx blood clots     Interventions:   - coumadin at home  - See additional Care Plan goals for specific interventions  Outcome: Progressing

## 2024-05-09 VITALS
RESPIRATION RATE: 18 BRPM | TEMPERATURE: 98 F | BODY MASS INDEX: 36.48 KG/M2 | WEIGHT: 260.56 LBS | OXYGEN SATURATION: 100 % | HEIGHT: 71 IN | SYSTOLIC BLOOD PRESSURE: 113 MMHG | HEART RATE: 59 BPM | DIASTOLIC BLOOD PRESSURE: 68 MMHG

## 2024-05-09 LAB
ANION GAP SERPL CALC-SCNC: 3 MMOL/L (ref 0–18)
BUN BLD-MCNC: 12 MG/DL (ref 9–23)
CALCIUM BLD-MCNC: 9.2 MG/DL (ref 8.5–10.1)
CHLORIDE SERPL-SCNC: 109 MMOL/L (ref 98–112)
CO2 SERPL-SCNC: 28 MMOL/L (ref 21–32)
CREAT BLD-MCNC: 0.97 MG/DL
EGFRCR SERPLBLD CKD-EPI 2021: 87 ML/MIN/1.73M2 (ref 60–?)
GLUCOSE BLD-MCNC: 101 MG/DL (ref 70–99)
INR BLD: 1.69 (ref 0.8–1.2)
OSMOLALITY SERPL CALC.SUM OF ELEC: 290 MOSM/KG (ref 275–295)
POTASSIUM SERPL-SCNC: 3.9 MMOL/L (ref 3.5–5.1)
PROTHROMBIN TIME: 20 SECONDS (ref 11.6–14.8)
SODIUM SERPL-SCNC: 140 MMOL/L (ref 136–145)

## 2024-05-09 PROCEDURE — 99239 HOSP IP/OBS DSCHRG MGMT >30: CPT | Performed by: STUDENT IN AN ORGANIZED HEALTH CARE EDUCATION/TRAINING PROGRAM

## 2024-05-09 RX ORDER — ASPIRIN 81 MG/1
81 TABLET ORAL DAILY
Qty: 30 TABLET | Refills: 11 | Status: SHIPPED | OUTPATIENT
Start: 2024-05-09

## 2024-05-09 RX ORDER — ROSUVASTATIN CALCIUM 10 MG/1
10 TABLET, COATED ORAL NIGHTLY
Qty: 30 TABLET | Refills: 3 | Status: SHIPPED | OUTPATIENT
Start: 2024-05-09

## 2024-05-09 RX ORDER — ISOSORBIDE MONONITRATE 30 MG/1
15 TABLET, EXTENDED RELEASE ORAL EVERY EVENING
Qty: 30 TABLET | Refills: 3 | Status: SHIPPED | OUTPATIENT
Start: 2024-05-09

## 2024-05-09 NOTE — DISCHARGE PLANNING
NURSING DISCHARGE NOTE    Discharged Home via Ambulatory.  Accompanied by RN  Belongings Taken by patient/family.    IV removed. Discharge education completed. All questions answered.

## 2024-05-09 NOTE — PROCEDURES
[facility]    PATIENT'S NAME: MERCY CARTY   ATTENDING PHYSICIAN: Placido Aranda MD   OPERATING PHYSICIAN: Dennis Burgos M.D.   PATIENT ACCOUNT#:   [id number]    LOCATION:  [address] [place] [address]  MEDICAL RECORD #:   [id number]       YOB: 1960  ADMISSION DATE:       05/07/2024      OPERATION DATE:  05/08/2024    CARDIAC PROCEDURE TRANSCRIPTION    CARDIAC CATHETERIZATION     PREOPERATIVE DIAGNOSIS:    1.   Unstable angina pectoris.  2.   Elevated troponin level, borderline.  3.   Dyslipidemia.  4.   History of pulmonary embolism and deep vein thrombosis.  5.   Chronic anticoagulation with warfarin.  6.   Gastroesophageal reflux disease.  7.   Obesity, body mass index of 36.  POSTOPERATIVE DIAGNOSIS:    1.   Unstable angina pectoris.  2.   Elevated troponin level, borderline.  3.   Dyslipidemia.  4.   History of pulmonary embolism and deep vein thrombosis.  5.   Chronic anticoagulation with warfarin.  6.   Gastroesophageal reflux disease.  7.   Obesity, body mass index of 36.  PROCEDURE PERFORMED:    1.   Selective coronary angiography.  2.   Left ventriculogram.    SEDATION:  We performed moderate conscious sedation with IV Versed and IV fentanyl.  Time of first sedation dose was 1257.  Procedure ended at 1331.  Blood pressure, pulse ox, and heart rate were monitored all the time by the nurse and myself, and IV line was checked by the nurse and myself.    PROCEDURE DESCRIPTION:  After written informed consent was obtained from the patient, he was brought to cardiac catheterization laboratory.  He was prepped and draped in the usual sterile fashion.  Then, 1% lidocaine was used to infiltrate his right groin for local anesthesia, and a 6-Mozambican introducer sheath was inserted into the right femoral artery using modified Seldinger technique.  We used micropuncture kit first and then a micropuncture sheath was removed and 6-Mozambican introducer sheath was inserted into the right femoral artery with no  difficulties.    Selective coronary angiography was performed using 6-Iranian FL4 and FR4 diagnostic catheters.  Left ventriculogram was performed using 6-Iranian pigtail catheter.  Then, we measured pullback pressures across the aortic valve and pigtail catheter was removed.    Right femoral arterial sheathogram was performed and was acceptable for percutaneous closure using 6-Iranian Angio-Seal closure device.  There was no bleeding or hematoma in the right groin.  The patient tolerated procedure very well and was transferred to telemetry for monitoring.  There were no immediate postcardiac catheterization complications.    HEMODYNAMICS:  Pullback pressures across the aortic valve were as follows:  LV cavity pressure 121/14 mmHg with central aortic pressure of 128/61/91 mmHg.   Heart rate was in 50 beats per minute.  The patient was in sinus rhythm with no prognostically significant arrhythmia.    SELECTIVE CORONARY ANGIOGRAPHY FINDINGS:    1.   Left main coronary artery had no disease angiographically.  2.   LAD artery had mild disease in the mid segment, otherwise, no disease angiographically.  The major diagonal branch had mild disease in proximal segment, otherwise, no disease.  3.   Left circumflex artery and OM was a small to medium size system with no disease angiographically.  4.   Right coronary artery had 100% proximal chronic total occlusion at the takeoff of small RV branch.  It was  with no thrombus.  No acute lesion.  5.   Left-to-right collaterals from mid and distal LAD via septal branches and distal apical LAD filling the distal RCA system up to mid RCA chronic total occlusion.  6.   Total length of RCA  was approximately 25 to 30 mm long.  7.   Left ventriculogram revealed preserved LV systolic function, with LVEF of 55% to 60% with distal inferior wall hypokinesis.  8.   No MR angiographically and no thoracic aorta aneurysm.    IMPRESSION:    1.   Chronic total occlusion of proximal right  coronary artery at the takeoff of small RV branch.  No active lesion.  2.   Well-developed left-to-right collaterals from mid and distal LAD via septal branches and distal apical LAD filling distal RCA system up to mid RCA .  3.   Total length of RCA  approximately 25 to 30 mm long.  4.   Preserved LV systolic function with LVEF of 55% to 60%, with distal inferior wall hypokinesis.  5.   No MR angiographically.  6.   No thoracic aorta aneurysm.  7.   No pressure gradient across the aortic valve.  8.   No significant disease in left coronary arterial system except for mild disease in mid LAD and mild disease in proximal diagonal branch, with small to medium size circumflex system and no disease in the left main coronary artery.     PLAN:    1.   Adjust cardiac medications with plan for elective RCA  PCI.  2.   Resume Coumadin tonight if no right groin issues after procedure, history of PE and DVT 2015, but D-dimer was negative this time.  3.   Check metabolic panel and INR tomorrow morning.  4.   Right leg straight for 2 hours.  5.   Discontinue IV heparin with normalized troponin.  6.   Normalized troponin and INR is 2.06.  7.   Start low dose low dose of rosuvastatin 10 mg p.o. daily.  8.   Continue gemfibrozil.  9.   Start low dose of Imdur 15 mg p.o. nightly.  10. Lower dose of aspirin from full dose to baby aspirin dose 81 mg p.o. daily since patient is on Coumadin/warfarin for history of thromboembolic event.  11. Not a good candidate for beta-blocker with heart rate in 50s.  12.   Discharge home tomorrow and will bring the patient for outpatient PCI of RCA  in couple weeks outpatient.  PCI will be done in retrograde fashion based on RCA anatomy.  We will set him up with Dr. Harris.  13.   Patient may return to work on Tuesday, 05/14/2024, and he will need it to be written on the paper when discharged.  14.   Weight lift limit 15 pounds until RCA is fixed.  15.   All was discussed with the  patient, his wife, and his sister as well as nursing staff in detail.    Dictated By Dennis Burgos M.D.  d: 05/08/2024 14:51:17  t: 05/08/2024 15:21:19  Job [id number]  SS/    cc: Willie Hanna M.D.   Placido Aranda MD

## 2024-05-09 NOTE — PROGRESS NOTES
[facility] Progress Note    Patient seen and examined.  Chart reviewed.      No chest pain or shortness of breath. Groin site c/d/i    /72 (BP Location: Right arm)   Pulse 68   Temp 97.8 °F (36.6 °C) (Oral)   Resp 20   Ht 71\"   Wt 260 lb 9.3 oz   SpO2 100%   BMI 36.34 kg/m²   Gen: alert and oriented  HEENT: moist mucous membranes  Neck: No JVD  CV: regular, liyah  Lungs: CTAB  Abd: soft, BS present  Ext: no edema  Skin: warm and dry      Intake/Output Summary (Last 24 hours) at 2024 0656  Last data filed at 2024 0525  Gross per 24 hour   Intake 240 ml   Output 926 ml   Net -686 ml       Lab Results   Component Value Date    CREATSERUM 0.97 2024    BUN 12 2024     2024    K 3.9 2024     2024    CO2 28.0 2024     2024    CA 9.2 2024    PTT 52.1 2024    INR 1.69 2024      [COMPLETED] iohexol (Omnipaque) 300 MG/ML injection 100 mL  100 mL Injection ONCE PRN    [COMPLETED] lidocaine PF (Xylocaine-MPF) 1 % injection        [COMPLETED] heparin (Porcine) 5000 UNIT/ML injection        [COMPLETED] fentaNYL (Sublimaze) 50 mcg/mL injection        [COMPLETED] midazolam (Versed) 2 MG/2ML injection        [COMPLETED] midazolam (Versed) 2 MG/2ML injection        rosuvastatin (Crestor) tab 10 mg  10 mg Oral Nightly    aspirin DR tab 81 mg  81 mg Oral Daily    isosorbide mononitrate ER (Imdur) 24 hr tab 15 mg  15 mg Oral QPM    [] sodium chloride 0.9% infusion   Intravenous Continuous    [COMPLETED] warfarin (Coumadin) tab 2.5 mg  2.5 mg Oral Once    [COMPLETED] aspirin tab 325 mg  325 mg Oral Once    [COMPLETED] heparin (Porcine) 1000 UNIT/ML injection - BOLUS IV 5,000 Units  5,000 Units Intravenous Once    [COMPLETED] heparin (Porcine) 53661 units/250mL infusion ED INITIAL DOSE  1,000 Units/hr Intravenous Once    gemfibrozil (Lopid) tab 600 mg  600 mg Oral BID AC    pantoprazole (Protonix) DR tab 40 mg  40 mg Oral Daily     acetaminophen (Tylenol Extra Strength) tab 1,000 mg  1,000 mg Oral Q4H PRN    melatonin tab 3 mg  3 mg Oral Nightly PRN    glycerin-hypromellose- (Artificial Tears) 0.2-0.2-1 % ophthalmic solution 1 drop  1 drop Both Eyes QID PRN    sodium chloride (Saline Mist) 0.65 % nasal solution 1 spray  1 spray Each Nare Q3H PRN    ondansetron (Zofran) 4 MG/2ML injection 4 mg  4 mg Intravenous Q6H PRN    metoclopramide (Reglan) 5 mg/mL injection 10 mg  10 mg Intravenous Q8H PRN    polyethylene glycol (PEG 3350) (Miralax) 17 g oral packet 17 g  17 g Oral Daily PRN    sennosides (Senokot) tab 17.2 mg  17.2 mg Oral Nightly PRN    bisacodyl (Dulcolax) 10 MG rectal suppository 10 mg  10 mg Rectal Daily PRN    fleet enema (Fleet) 7-19 GM/118ML rectal enema 133 mL  1 enema Rectal Once PRN    nitroglycerin (Nitrostat) SL tab 0.4 mg  0.4 mg Sublingual Q5 Min PRN    [COMPLETED] potassium chloride (Klor-Con M20) tab 40 mEq  40 mEq Oral Once     LH yesterday:        Selective coronary angiography findings:  -Mild mid LAD disease and mild proximal diagonal branch disease otherwise normal left coronary arterial system angiographically  -Small to medium size circumflex system     -RCA -100% proximal chronic total occlusion related to takeoff of small RV branch -chronic with no thrombus -no acute lesion     -Left-to-right collaterals from mid and distal LAD via septal branches and distal apical LAD filling the distal RCA system up to mid RCA      Total length of RCA  approximately 25 to 30 mm long.     LVEF 55 to 60% with distal inferior wall hypokinesis.  No MR angiographically and no thoracic aorta aneurysm.  No pressure gradient across aortic valve.  Successful hemostasis of right femoral artery with 6 Luxembourgish Angio-Seal closure device.            Imp/Recs:  CAD compensated with LHC yesterday showing  or RCA with good colaterals.  Cardiac meds adjusted per Dr. Burgos.  Plan for home today with patient in stable condition.   Coumadin resumed last night will allow to drift to therapeutic levels - he has hx of DVT.  Timing of  procedure to be determined with Dr. Gilbert. No oral BB due to bradycardia.    For HLP low dose statin - can uptitrate as outpatient and gemfibrozil.    Will follow,    Jackson Lamar MD

## 2024-05-09 NOTE — PLAN OF CARE
Assumed care of patient at 1930. Patient is alert and orientated x4. Currently room air. Lung sounds clear. Continuous pulse ox maintained. NSR/SB on tele. Continent of bowel and bladder. No complaints of pain. Up standby assist and walker. Call light within reach. Fall precautions in place.    Plan of care:  Possible discharge today ?     Problem: Patient/Family Goals  Goal: Patient/Family Long Term Goal  Description: Patient's Long Term Goal: to prevent readmission    Interventions:  - take medications as prescribed  - See additional Care Plan goals for specific interventions  Outcome: Progressing  Goal: Patient/Family Short Term Goal  Description: Patient's Short Term Goal: to be discharged    Interventions:   - Echo  - Heparin drip  - See additional Care Plan goals for specific interventions  Outcome: Progressing     Problem: CARDIOVASCULAR - ADULT  Goal: Maintains optimal cardiac output and hemodynamic stability  Description: INTERVENTIONS:  - Monitor vital signs, rhythm, and trends  - Monitor for bleeding, hypotension and signs of decreased cardiac output  - Evaluate effectiveness of vasoactive medications to optimize hemodynamic stability  - Monitor arterial and/or venous puncture sites for bleeding and/or hematoma  - Assess quality of pulses, skin color and temperature  - Assess for signs of decreased coronary artery perfusion - ex. Angina  - Evaluate fluid balance, assess for edema, trend weights  Outcome: Progressing  Goal: Absence of cardiac arrhythmias or at baseline  Description: INTERVENTIONS:  - Continuous cardiac monitoring, monitor vital signs, obtain 12 lead EKG if indicated  - Evaluate effectiveness of antiarrhythmic and heart rate control medications as ordered  - Initiate emergency measures for life threatening arrhythmias  - Monitor electrolytes and administer replacement therapy as ordered  Outcome: Progressing

## 2024-05-09 NOTE — DISCHARGE INSTRUCTIONS
Patient may return to work on Tuesday, May 14, 2024-he will need it to be written on the paper when discharged  -Weight lift limit 15 pounds till RCA is fixed

## 2024-05-09 NOTE — PROGRESS NOTES
[facility]   part of [facility]     Hospitalist Progress Note     Mitchell Quintanilla Patient Status:  Observation    10/16/1960 MRN [id number]   Location [facility] 8NE-A Attending Tone Chauhan MD   Hosp Day # 0 PCP Willie Hanna MD     Chief Complaint: Chest pain    Subjective:      - CP resolved, denies f/c, sob, abd pain, n/v    Objective:    Review of Systems:   A comprehensive review of systems was completed; pertinent positive and negatives stated in subjective.    Vital signs:  Temp:  [97.6 °F (36.4 °C)-98.4 °F (36.9 °C)] 97.6 °F (36.4 °C)  Pulse:  [51-68] 51  Resp:  [12-20] 18  BP: (104-158)/(60-85) 104/68  SpO2:  [96 %-100 %] 97 %    Physical Exam:    General: No acute distress  Respiratory: no wheezes, no rhonchi  Cardiovascular: S1, S2, regular rate and rhythm  Abdomen: Soft, Non-tender, non-distended, positive bowel sounds. Right groin site soft, without surrounding bruising or swelling   Neuro: No new focal deficits.   Extremities: no edema    Diagnostic Data:    Labs:  Recent Labs   Lab 24  0000 24  1522 24  1523 24  0534 24  0523   WBC  --  5.2  --  4.3  --    HGB  --  13.5  --  13.0  --    MCV  --  95.0  --  95.0  --    PLT  --  224.0  --  208.0  208.0  --    INR 2.30*  --  2.06* 2.06* 1.69*       Recent Labs   Lab 24  1523 24  0534 24  0523   GLU 89 105* 101*   BUN 13 12 12   CREATSERUM 1.16 0.98 0.97   CA 9.6 8.9 9.2   ALB 4.0  --   --     140 140   K 3.7 4.0  4.0 3.9    111 109   CO2 28.0 25.0 28.0   ALKPHO 44*  --   --    AST 21  --   --    ALT 26  --   --    BILT 0.4  --   --    TP 7.7  --   --        Estimated Creatinine Clearance: 80.9 mL/min (based on SCr of 0.97 mg/dL).    Recent Labs   Lab 24  1523 24  1904 24  0534   TROPHS 169* 119* 59       Recent Labs   Lab 24  1523 24  0534 24  0523   PTP 23.5* 23.5* 20.0*   INR 2.06* 2.06* 1.69*                  Microbiology    No results  found for this visit on 05/07/24.      Imaging: Reviewed in Epic.    Medications:    rosuvastatin  10 mg Oral Nightly    aspirin  81 mg Oral Daily    isosorbide mononitrate ER  15 mg Oral QPM    gemfibrozil  600 mg Oral BID AC    pantoprazole  40 mg Oral Daily       Assessment & Plan:      # CAD   - S/p [facility] with  RCA,, mild mild LAD disease    - Plan for outpt PCI with cardiology for RCA , outpt appt set up    - ASA, statin, warfarin   - TTE with EF 60-65%, no RWMA, no other significant abnormalities noted    # Hypercoagulable state     - continue warfarin    # Hyperlipidemia - continue home statin, gemfibrozil     Home today    Placido Aranda MD    Supplementary Documentation:     Quality:  DVT Mechanical Prophylaxis:     Early ambuation  DVT Pharmacologic Prophylaxis   Medication   None         DVT Pharmacologic prophylaxis: Aspirin 81 mg      Code Status: Full Code  Carrera: No urinary catheter in place  Carrera Duration (in days):   Central line:    PRECIOUS: 5/9/2024    The 21st Century Cures Act makes medical notes like these available to patients in the interest of transparency. Please be advised this is a medical document. Medical documents are intended to carry relevant information, facts as evident, and the clinical opinion of the practitioner. The medical note is intended as peer to peer communication and may appear blunt or direct. It is written in medical language and may contain abbreviations or verbiage that are unfamiliar.

## 2024-05-09 NOTE — PLAN OF CARE
Pt. Is alert and oriented times 4. Sinus liyah on tele. Pt. Denies chest pain or shortness of breath. Pt. On RA O2 in the 90's. Pt. Is continent of bowel and bladder. Up ad amy. Pt. Updated on plan of care. Okay to discharge from cardiology perspective. Hospitalist notified.   All questions answered. Call light within reach.   Problem: Patient/Family Goals  Goal: Patient/Family Long Term Goal  Description: Patient's Long Term Goal: to prevent readmission    Interventions:  - take medications as prescribed  - See additional Care Plan goals for specific interventions  Outcome: Progressing  Goal: Patient/Family Short Term Goal  Description: Patient's Short Term Goal: to be discharged    Interventions:   - Echo  - Heparin drip  - See additional Care Plan goals for specific interventions  Outcome: Progressing     Problem: CARDIOVASCULAR - ADULT  Goal: Maintains optimal cardiac output and hemodynamic stability  Description: INTERVENTIONS:  - Monitor vital signs, rhythm, and trends  - Monitor for bleeding, hypotension and signs of decreased cardiac output  - Evaluate effectiveness of vasoactive medications to optimize hemodynamic stability  - Monitor arterial and/or venous puncture sites for bleeding and/or hematoma  - Assess quality of pulses, skin color and temperature  - Assess for signs of decreased coronary artery perfusion - ex. Angina  - Evaluate fluid balance, assess for edema, trend weights  Outcome: Progressing  Goal: Absence of cardiac arrhythmias or at baseline  Description: INTERVENTIONS:  - Continuous cardiac monitoring, monitor vital signs, obtain 12 lead EKG if indicated  - Evaluate effectiveness of antiarrhythmic and heart rate control medications as ordered  - Initiate emergency measures for life threatening arrhythmias  - Monitor electrolytes and administer replacement therapy as ordered  Outcome: Progressing

## 2024-05-10 ENCOUNTER — PATIENT OUTREACH (OUTPATIENT)
Dept: CASE MANAGEMENT | Age: 64
End: 2024-05-10

## 2024-05-10 DIAGNOSIS — R07.9 ACUTE CHEST PAIN: ICD-10-CM

## 2024-05-10 DIAGNOSIS — Z02.9 ENCOUNTERS FOR UNSPECIFIED ADMINISTRATIVE PURPOSE: Primary | ICD-10-CM

## 2024-05-10 PROCEDURE — 1111F DSCHRG MED/CURRENT MED MERGE: CPT

## 2024-05-10 NOTE — PROGRESS NOTES
Attempted to contact pt for TCM however no answer. Call continued to ring and then disconnected. Unable to leave a VM at this time NCM to try again at a later time.

## 2024-05-10 NOTE — PROGRESS NOTES
Initial Post Discharge Follow Up   Discharge Date: 5/9/24  Contact Date: 5/10/2024    Consent Verification:  Assessment Completed With: Patient  HIPAA Verified?  Yes    Discharge Dx:   CAD   - S/p LHC with  RCA    General:   How have you been since your discharge from the hospital? Pt reports he is doing better. Pt denies s/s of infection to groin site. Pt denies fevers. Pt stated he checks his INR at home himself and plans to check level again next Saturday. Pt stated [facility]. Pt denies blood in urine/stool, chest pain, shortness of breath, dizziness, weakness, confusion, n/v/d, HA and pain. Pt has a BP cuff at home and plans to start check BP today. Recommended keeping a log. Pt denies concerns at this time.   Do you have any pain since discharge?  No    How well was your pain managed while in the hospital?   On a scale of 1-5   1- Very Poor and 5- Very well   Very Well  When you were leaving the hospital were your discharge instructions reviewed with you? Yes  How well were your discharge instructions explained to you?   On a scale of 1-5   1- Very Poor and 5- Very well   Very Well  Do you have any questions about your discharge instructions?  No  Before leaving the hospital was your diagnoses explained to you? Yes  Do you have any questions about your diagnoses? No  Are you able to perform normal daily activities of living as you have prior to your hospital stay (dressing, bathing, ambulating to the bathroom, etc)? yes  (NCM) Was patient given a different diet per AVS? yes  If so, which diet?  Heart Healthy diet   Are there any barriers to following this diet? no    Medications:   Current Outpatient Medications   Medication Sig Dispense Refill    aspirin 81 MG Oral Tab EC Take 1 tablet (81 mg total) by mouth daily. 30 tablet 11    isosorbide mononitrate ER 30 MG Oral Tablet 24 Hr Take 0.5 tablets (15 mg total) by mouth every evening. 30 tablet 3     rosuvastatin 10 MG Oral Tab Take 1 tablet (10 mg total) by mouth nightly. 30 tablet 3    Multiple Vitamin (MULTI-VITAMIN DAILY) Oral Tab Take 1 tablet by mouth daily.      Vitamin C 500 MG Oral Tab Take 1 tablet (500 mg total) by mouth in the morning and 1 tablet (500 mg total) before bedtime.      gemfibrozil 600 MG Oral Tab Take 1 tablet (600 mg total) by mouth 2 (two) times daily before meals. 180 tablet 1    pantoprazole 40 MG Oral Tab EC Take 1 tablet (40 mg total) by mouth daily. 90 tablet 2    warfarin 5 MG Oral Tab TAKE ONE and ONE-HALF TABLET BY MOUTH DAILY AS DIRECTED (Patient taking differently: TAKE 5 mg on fridays and 2.5 mg every day except fridays) 135 tablet 0    Acetaminophen (ARTHRITIS PAIN OR) Take by mouth.      Cholecalciferol (VITAMIN D3) 2000 units Oral Tab Take by mouth.       Were there any changes to your current medication(s) noted on the AVS? Yes  If so, were these medication changes discussed with you prior to leaving the hospital? Yes  If a new medication was prescribed:    Was the new medication's purpose & side effects reviewed? Yes  Do you have any questions about your new medication? No  Did you  your discharge medications when you left the hospital? Yes  Let's go over your medications together to make sure we are not missing anything. Medications Reviewed  Are there any reasons that keep you from taking your medication as prescribed? No  Are you having any concerns with constipation? No    Discharge medications reviewed/discussed/and reconciled against outpatient medications with patient.  Any changes or updates to medications sent to PCP.  Patient Acknowledged     Referrals/orders at D/C:  Referrals/orders placed at D/C? no    DME ordered at D/C? No  Discharge orders, AVS reviewed and discussed with patient. Any changes or updates to orders sent to PCP.  Patient Acknowledged      SDOH:      Transportation Needs: No Transportation Needs (5/10/2024)    Transportation Needs      Lack of Transportation: No     Car Seat: Not on file     Financial Resource Strain: Low Risk  (5/10/2024)    Financial Resource Strain     Difficulty of Paying Living Expenses: Not very hard     Med Affordability: Not on file       Follow up appointments:  Reviewed recommended follow up appointments. Pt denies any barriers to keeping/getting to HFU appts.         TCC  Was TCC ordered: No    PCP (If no TCC appointment)  Does patient already have a PCP appointment scheduled? No  NCM Attempted to schedule PCP office TCM appointment with patient   If no appointment scheduled: Explain: Pt declined, plans to FU with Cardiology.    Specialist    Does the patient have any other follow up appointment(s) needing to be scheduled? yes  If yes: NCM reviewed upcoming specialist appointment with patient: yes  Does the patient need assistance scheduling appointment(s): No- Pt is awaiting a call from Cardiology for an appt/set up procedure.   Is there any reason as to why you cannot make your appointment(s)?  No     Needs post D/C:   Now that you are home, are there any needs or concerns you need addressed before your next visit with your PCP?  (DME, meds, questions, etc.): No    Interventions by NCM:   All d/c instructions reviewed with the pt. Reviewed when to call MD vs when to call 911 or go the ED. Discussed s/s of infection and reviewed bleeding precautions. Educated pt on the importance of taking all meds as prescribed as well as close f/u with PCP/specialists. Pt verbalized understanding and will contact the office with any further questions or concerns.       Overall Rating:   How would you rate the care you received while in the hospital? good    CCM referral placed:    Not Applicable    BOOK BY DATE: 5/23/24

## 2024-05-14 ENCOUNTER — TELEPHONE (OUTPATIENT)
Dept: HEMATOLOGY/ONCOLOGY | Facility: HOSPITAL | Age: 64
End: 2024-05-14

## 2024-05-14 NOTE — TELEPHONE ENCOUNTER
Cherelle from [facility] calling re: pt having cardiac procedure on 5/24/24, requesting clearance for pt to hold coumadin x 3 days prior to scheduled procedure. Approval to hold given per MQ.

## 2024-05-15 NOTE — DISCHARGE SUMMARY
[facility]     Mitchell Quintanilla Patient Status:  Observation    10/16/1960 MRN [id number]   Location [facility] 8NE-A Attending No att. providers found   Hosp Day # 0 PCP Willie Hanna MD     Date of Admission: 2024  Date of Discharge: 2024  Discharge Disposition: Home or Self Care    Admitting Diagnosis:   Unstable angina (HCC) [I20.0]  Acute chest pain [R07.9]  Troponin level elevated [R79.89]    Hospital Discharge Diagnoses:   Chest pain  CAD  Hypercoagulable state on warfarin  Hyperlipidemia    Lace+ Score: 34  59-90 High Risk  29-58 Medium Risk  0-28   Low Risk.    TCM Follow-Up Recommendation:  LACE 29-58: Moderate Risk of readmission after discharge from the hospital.        Discharge Diagnosis:   Unstable angina    History of Present Illness: Mitchell Quintanilla is a 63 year old male with a past medical history of DVT on Coumadin, gastric reflux, hyperlipidemia.  He comes emergency room now secondary to chest pain.  He states that he has had an aching sensation in the midsternal region.  This has been ongoing for the past week or so.  He states that it worsens with exertion.  He was cutting grass earlier and had to stop due to his symptoms.  In the emergency room the patient's troponin is elevated.     Brief Synopsis: Patient with chest pain with elevated troponins, cardiology consulted, started on heparin drip, underwent left heart catheterization on 2024 with findings of mild mid LAD disease, 100% RCA .  TTE with EF 55 to 60%, with distal inferior wall hypokinesis.  Cardiology recommending RCA  PCI in a few weeks following discharge, arranged by cardiology clinic.  Patient started on medical therapy at this time with statin, Imdur, aspirin.  Unable to tolerate beta-blocker due to bradycardia.  Discharged home with outpatient cardiology follow-up for treatment of RCA .    Procedures during hospitalization:   Left heart catheterization    Incidental or  significant findings and recommendations (brief descriptions):  RCA  found on left heart catheterization, plan for outpatient PCI with cardiology in a few weeks following discharge, cardiology clinic to set up appointment  Started medical therapy with aspirin, statin, Imdur; home warfarin continued    Lab/Test results pending at Discharge:   None    Consultants:  Cardiology    Discharge Medication List:     Discharge Medications        START taking these medications        Instructions Prescription details   aspirin 81 MG Tbec      Take 1 tablet (81 mg total) by mouth daily.   Quantity: 30 tablet  Refills: 11     isosorbide mononitrate ER 30 MG Tb24  Commonly known as: Imdur      Take 0.5 tablets (15 mg total) by mouth every evening.   Quantity: 30 tablet  Refills: 3     rosuvastatin 10 MG Tabs  Commonly known as: Crestor      Take 1 tablet (10 mg total) by mouth nightly.   Quantity: 30 tablet  Refills: 3            CHANGE how you take these medications        Instructions Prescription details   warfarin 5 MG Tabs  Commonly known as: Coumadin  What changed: additional instructions      Take as directed. If you are unsure how to take this medication, talk to your nurse or doctor.  Original instructions: TAKE ONE and ONE-HALF TABLET BY MOUTH DAILY AS DIRECTED   Quantity: 135 tablet  Refills: 0            CONTINUE taking these medications        Instructions Prescription details   ARTHRITIS PAIN OR      Take by mouth.   Refills: 0     cholecalciferol 50 MCG (2000 UT) Tabs  Commonly known as: Vitamin D3      Take by mouth.   Refills: 0     gemfibrozil 600 MG Tabs  Commonly known as: Lopid      Take 1 tablet (600 mg total) by mouth 2 (two) times daily before meals.   Quantity: 180 tablet  Refills: 1     Multi-Vitamin Daily Tabs      Take 1 tablet by mouth daily.   Refills: 0     pantoprazole 40 MG Tbec  Commonly known as: Protonix      Take 1 tablet (40 mg total) by mouth daily.   Quantity: 90 tablet  Refills: 2      Vitamin C 500 MG Tabs  Commonly known as: VITAMIN C      Take 1 tablet (500 mg total) by mouth in the morning and 1 tablet (500 mg total) before bedtime.   Refills: 0               Where to Get Your Medications        These medications were sent to [id number] - [place], [place] - [address]  AT [address] [facility] [address], [phone], [phone] [address] [facility] [address]      Phone: [phone]   aspirin 81 MG Tbec  isosorbide mononitrate ER 30 MG Tb24  rosuvastatin 10 MG Tabs         [place]: [place]    Follow-up appointment:   [facility] [address] [address] [phone]  Follow up  office will call to schedule follow up procedure in about 2 weeks, with Dr. Geremias Harris.    Willie Hanna MD  [address]  [facility] [address] [phone]    Follow up        Vital signs:       Physical Exam:  See exam from day of discharge note  -----------------------------------------------------------------------------------------------  PATIENT DISCHARGE INSTRUCTIONS: See electronic chart    Placido Aranda MD 5/15/2024    Time spent:  35 minutes

## 2024-05-20 ENCOUNTER — ANTI-COAG VISIT (OUTPATIENT)
Dept: HEMATOLOGY/ONCOLOGY | Facility: HOSPITAL | Age: 64
End: 2024-05-20

## 2024-05-20 LAB — INR: 2.3 (ref 0.8–1.2)

## 2024-05-22 RX ORDER — CLOPIDOGREL BISULFATE 75 MG/1
75 TABLET ORAL DAILY
Status: ON HOLD | COMMUNITY
End: 2024-05-24

## 2024-05-22 NOTE — H&P
Dennis Burgos MD   Physician  Cardiology     Consults     Signed     Date of Service: 2024  8:27 AM  Consult Orders   ED Consult to Cardiology [[id number]] ordered by Andreas Acosta MD at 24 1632          Signed       Expand All Collapse All         [facility]  Report of Consultation           Mitchell Quintanilla Patient Status:  Observation    10/16/1960 MRN [id number]   Location [facility] 8NE-A Attending Tone Chauhan MD   Hosp Day # 0 PCP Willie Hanna MD      Reason for Consultation:  Chest pain  Elevated troponin        History of Present Illness:  Mitchell Quinatnilla is a a(n) 63 year old male with presented with couple week intermittent chest pain which became more constant and he came to emergency room.  Pain was radiating to the back near the right axilla.  He also reported some achiness in midsternal area on on the right side.  Usually not related to exertion but had to stop a couple times when he was cutting grass.  Normal blood pressure with no tachycardia but mild bradycardia.  No hypoxia.  He denies any recent travel.     no prior cardiac history but history of PE and DVT on chronic anticoagulation with warfarin, obesity with BMI of 36, dyslipidemia, spinal arthritis with history of back surgery and peripheral neuropathy.     Telemetry: Stable sinus     EKG: Unremarkable, sinus 61 bpm with no acute ischemia or pathological Q waves.  Minor nonspecific changes.  Normal intervals and normal axis.     Echo -2015 -LVEF 55 to 60% with preserved RV function with no significant valvular abnormalities.     I reviewed chest x-ray images myself -no acute changes.     Stress Test -negative nuclear stress test for ischemia -2012.     No prior cardiac procedures.     Important labs: Glucose 105 potassium 4.0 creatinine 0.98 and normal liver enzymes.  Total cholesterol 201 triglycerides 177  HDL 52.  Troponin 169 then 119.  INR 2.06 and negative D-dimer and CBC  unremarkable.     History:  Past Medical History       Past Medical History:    Arthritis    Bronchitis, not specified as acute or chronic    Cough    Current use of long term anticoagulation    DVT (deep venous thrombosis) (HCC)    Gastric reflux    Hernia of unspecified site of abdominal cavity without mention of obstruction or gangrene    Hyperlipidemia    Obesity    PE (pulmonary embolism)    Screening for malignant neoplasm of colon         Past Surgical History         Past Surgical History:   Procedure Laterality Date    Back surgery        Knee arthroscp harv   2/09    Tonsillectomy   61    Vasectomy   89         Family History         Family History   Problem Relation Age of Onset    Other (colon cancer) Father      Other (diabetes mellitus) Father      Cancer Father      Diabetes Father      Hypertension Mother              Social History:   reports that he has never smoked. He has never used smokeless tobacco. He reports that he does not drink alcohol and does not use drugs.     Allergies:  Allergies         Allergies   Allergen Reactions    Darvocet [Propoxyphene N-Apap] NAUSEA ONLY       A500 TABS; abdominal pain               Medications:    Current Hospital Medications      Current Facility-Administered Medications:     heparin (Porcine) 33883 units/250mL infusion ACS/AFIB CONTINUOUS, 200-3,000 Units/hr, Intravenous, Continuous    gemfibrozil (Lopid) tab 600 mg, 600 mg, Oral, BID AC    pantoprazole (Protonix) DR tab 40 mg, 40 mg, Oral, Daily    acetaminophen (Tylenol Extra Strength) tab 1,000 mg, 1,000 mg, Oral, Q4H PRN    melatonin tab 3 mg, 3 mg, Oral, Nightly PRN    glycerin-hypromellose- (Artificial Tears) 0.2-0.2-1 % ophthalmic solution 1 drop, 1 drop, Both Eyes, QID PRN    sodium chloride (Saline Mist) 0.65 % nasal solution 1 spray, 1 spray, Each Nare, Q3H PRN    ondansetron (Zofran) 4 MG/2ML injection 4 mg, 4 mg, Intravenous, Q6H PRN    metoclopramide (Reglan) 5 mg/mL injection 10 mg, 10  mg, Intravenous, Q8H PRN    polyethylene glycol (PEG 3350) (Miralax) 17 g oral packet 17 g, 17 g, Oral, Daily PRN    sennosides (Senokot) tab 17.2 mg, 17.2 mg, Oral, Nightly PRN    bisacodyl (Dulcolax) 10 MG rectal suppository 10 mg, 10 mg, Rectal, Daily PRN    fleet enema (Fleet) 7-19 GM/118ML rectal enema 133 mL, 1 enema, Rectal, Once PRN    aspirin tab 325 mg, 325 mg, Oral, Daily    nitroglycerin (Nitrostat) SL tab 0.4 mg, 0.4 mg, Sublingual, Q5 Min PRN        Review of Systems:     Constitutional: Negative for fever or chills. No significant weight changes.  Eyes: Patient denies significant visual changes.  Ears, Nose, Mouth, Throat:  Negative for any new hearing loss. No sore throat. No nasal congestion.  Cardiovascular: see HPI -history of PE and DVT.  On Coumadin.  Intermittent chest pain x 2 weeks.  Respiratory: No cough, wheezing or hemoptysis, no dyspnea.  Hematologic/Lymphatic: No easy bruising or bleeding.   Integumentary: No rash or suspicious lesions on the skin.   Musculoskeletal: Osteoarthritis.  Gastrointestinal: Negative for any bleeding. No abdominal pain. No diarrhea.  Genitourinary: No hematuria.   Neurological: Alert and oriented, no headaches, no focal weakness or new paresthesias.  Allergic/Immunologic: no rhinitis or environmental allergies        Physical Exam:  Blood pressure 118/78, pulse 55, temperature 97.7 °F (36.5 °C), temperature source Oral, resp. rate 18, height 71\", weight 260 lb 9.3 oz (118.2 kg), SpO2 98%.  Temp (24hrs), Av.1 °F (36.7 °C), Min:97.7 °F (36.5 °C), Max:98.5 °F (36.9 °C)         Wt Readings from Last 3 Encounters:   24 260 lb 9.3 oz (118.2 kg)   10/10/23 263 lb (119.3 kg)   23 254 lb (115.2 kg)        Constitutional: Normal appearance. No apparent distress.  Obesity.  Eyes: Moist conjunctivae, PERRLA.  Ears, Nose, Mouth, Throat:  Moist mucosa. Anicteric sclera. Oropharynx clear.  Head and Neck: No JVD, carotids 2+ no bruits. Neck supple. No  thyromegaly or adenopathy. Normocephalic head.  Cardiovascular: Regular rate and rhythm, S1, S2 normal, no murmur, rub or gallop.  Respiratory: Clear lungs without wheezes, rales, rhonchi or dullness.  Normal excursions and effort.  Gastrointestinal: Soft, non-tender abdomen.   Extremities: Without clubbing, cyanosis or edema.  Peripheral pulses are 2+.  Neurologic: Alert and oriented x3.  Cranial nerves intact. Normal sensation and motor function. No focal deficits.  Musculoskeletal: normal range of motion, normal muscle strength, no joint effusion.   Integumentary: Warm and dry skin. No rashes.  Psychiatric: no depression. Normal affect.      Laboratories and Data:     Imaging:  XR CHEST AP PORTABLE  (CPT=71045)     Result Date: 5/7/2024  PROCEDURE:  XR CHEST AP PORTABLE  (CPT=71045)  TECHNIQUE:  AP chest radiograph was obtained.  COMPARISON:  External Exams, XR, XR CHEST PA + LAT CHEST (EMJ=[id number]), 1/03/2023, 5:22 PM.  INDICATIONS:  Pt with intermittent chest pain for a couple of weeks  PATIENT STATED HISTORY: (As transcribed by Technologist)  Mid chest pain for past 2 weeks off and on.    FINDINGS:  Lungs and pleural spaces are clear.  There is some thickening of the right paratracheal stripe.  This is probably related to great vessel tortuosity and is in retrospect stable as compared to prior study.  Heart size is within normal limits.             CONCLUSION:  Widening of the mediastinum is probably related to great vessel tortuosity and is not significantly changed.  There is otherwise no acute abnormality.           Labs:            Lab Results   Component Value Date     INR 2.06 (H) 05/08/2024     INR 2.06 (H) 05/07/2024     INR 2.30 (A) 05/06/2024            Lab Results   Component Value Date      05/07/2024     HDL 52 05/07/2024     TRIG 177 05/07/2024     VLDL 31 05/07/2024            Lab Results   Component Value Date     WBC 4.3 05/08/2024     HGB 13.0 05/08/2024     HCT 37.7 05/08/2024     PLT  208.0 05/08/2024     .0 05/08/2024     CREATSERUM 0.98 05/08/2024     BUN 12 05/08/2024      05/08/2024     K 4.0 05/08/2024     K 4.0 05/08/2024      05/08/2024     CO2 25.0 05/08/2024      05/08/2024     CA 8.9 05/08/2024     ALB 4.0 05/07/2024     ALKPHO 44 05/07/2024     BILT 0.4 05/07/2024     TP 7.7 05/07/2024     AST 21 05/07/2024     ALT 26 05/07/2024     PTT 82.3 05/08/2024     INR 2.06 05/08/2024     PTP 23.5 05/08/2024     DDIMER <0.27 05/07/2024      Impression:  Chest pain, intermittent, x 2 weeks.  Unstable angina.  Elevated troponin level -169 that trended down to 116 and then negative.  History of bilateral PE and DVT November 2015, unprovoked -followed by Dr. Smith from heme-onc outpatient.  D-dimer negative this time.  Chronic anticoagulation with warfarin.  INR 2.0.  Hyperlipidemia on gemfibrozil - .  GERD on PPI.  Spine, bilateral knee and other joint arthritis with chronic back pain and low back surgery.  Lumbar radiculitis/radiculopathy.  Obesity BMI of 36.     Plan:  -Telemetry monitoring  -Echo Doppler to assess LV systolic function and for any wall motion abnormalities  -Holding Coumadin INR 2.0  -Aspirin 325  -IV heparin for anticoagulation started in ER  -Recommend coronary angiography rather than stress test despite normalization of troponin level with a good story     Patient was consented. The risks and benefits of the procedure were explained to the patient. 1-2% risk of coronary angiography, possible angioplasty, include, but are not limited to stroke, death, heart attack, coronary dissection requiring emergent CABG, hematoma, bleeding, allergic reaction to medications, vascular damage requiring surgical repair, kidney failure requiring dialysis and others. Patient wishes to proceed.     Discussed with the patient, his wife and nursing staff     Rocky Burgos M.D., F.A.C.C.  [facility]     5/8/2024  8:27 AM  C5                Electronically signed by Dennis Burgos MD at 5/8/2024  9:37 AM         ED to Hosp-Admission (Discharged) on 5/7/2024            Detailed Report          Note shared with patient  Chart Review: Note Routing History    No routing history on file.

## 2024-05-22 NOTE — PAT NURSING NOTE
PreOp Instructions     You are scheduled for: a Cardiac Procedure     Date of Procedure: 05/24/24     Diet Instructions: Do not eat or drink anything after midnight     Medications: Medications you are allowed to take can be taken with a sip of water the morning of your procedure     Medications to Stop: Hold herbal supplements and vitamins morning of.     Continue to hold warfarin     Skin Prep: Shower with antibacterial soap using a clean washcloth, prior to procedure     Arrival Time: The day prior to your procedure you will receive a phone call before 6:00 pm with your arrival time. If you haven't received a phone call, please check your voicemail messages., If you did not receive a voice mail and it is after 6:00 pm, please call the nursing supervisor at [phone].    Driving After Procedure: If sedation is given, you WILL NOT be able to drive home. You will need a responsible adult  to drive you home.     Discharge Teaching: Your nurse will give you specific instructions before discharge, Most people can resume normal activities in 2-3 days, Any questions, please call the physician's office

## 2024-05-24 ENCOUNTER — HOSPITAL ENCOUNTER (OUTPATIENT)
Dept: INTERVENTIONAL RADIOLOGY/VASCULAR | Facility: HOSPITAL | Age: 64
Discharge: HOME | End: 2024-05-24
Attending: INTERNAL MEDICINE | Admitting: INTERNAL MEDICINE

## 2024-05-24 VITALS
DIASTOLIC BLOOD PRESSURE: 67 MMHG | TEMPERATURE: 97 F | HEART RATE: 71 BPM | RESPIRATION RATE: 17 BRPM | SYSTOLIC BLOOD PRESSURE: 148 MMHG | OXYGEN SATURATION: 96 %

## 2024-05-24 DIAGNOSIS — I25.82 CHRONIC TOTAL OCCLUSION OF NATIVE CORONARY ARTERY: ICD-10-CM

## 2024-05-24 DIAGNOSIS — I25.10 CHRONIC TOTAL OCCLUSION OF NATIVE CORONARY ARTERY: ICD-10-CM

## 2024-05-24 LAB
ATRIAL RATE: 64 BPM
INR: 1.3 (ref 0.8–1.3)
ISTAT ACTIVATED CLOTTING TIME: 304 SECONDS (ref 74–137)
ISTAT ACTIVATED CLOTTING TIME: 336 SECONDS (ref 74–137)
P AXIS: -12 DEGREES
P-R INTERVAL: 154 MS
Q-T INTERVAL: 428 MS
QRS DURATION: 104 MS
QTC CALCULATION (BEZET): 441 MS
R AXIS: 14 DEGREES
T AXIS: 12 DEGREES
VENTRICULAR RATE: 64 BPM

## 2024-05-24 PROCEDURE — 027035Z DILATION OF CORONARY ARTERY, ONE ARTERY WITH TWO DRUG-ELUTING INTRALUMINAL DEVICES, PERCUTANEOUS APPROACH: ICD-10-PCS | Performed by: INTERNAL MEDICINE

## 2024-05-24 PROCEDURE — B240ZZ3 ULTRASONOGRAPHY OF SINGLE CORONARY ARTERY, INTRAVASCULAR: ICD-10-PCS | Performed by: INTERNAL MEDICINE

## 2024-05-24 PROCEDURE — 93010 ELECTROCARDIOGRAM REPORT: CPT | Performed by: INTERNAL MEDICINE

## 2024-05-24 PROCEDURE — 99211 OFF/OP EST MAY X REQ PHY/QHP: CPT

## 2024-05-24 PROCEDURE — 99153 MOD SED SAME PHYS/QHP EA: CPT | Performed by: INTERNAL MEDICINE

## 2024-05-24 PROCEDURE — 99152 MOD SED SAME PHYS/QHP 5/>YRS: CPT | Performed by: INTERNAL MEDICINE

## 2024-05-24 PROCEDURE — 93005 ELECTROCARDIOGRAM TRACING: CPT

## 2024-05-24 PROCEDURE — 85347 COAGULATION TIME ACTIVATED: CPT

## 2024-05-24 PROCEDURE — 92978 ENDOLUMINL IVUS OCT C 1ST: CPT | Performed by: INTERNAL MEDICINE

## 2024-05-24 RX ORDER — HEPARIN SODIUM 5000 [USP'U]/ML
INJECTION, SOLUTION INTRAVENOUS; SUBCUTANEOUS
Status: COMPLETED
Start: 2024-05-24 | End: 2024-05-24

## 2024-05-24 RX ORDER — CLOPIDOGREL BISULFATE 75 MG/1
75 TABLET ORAL DAILY
Status: DISCONTINUED | OUTPATIENT
Start: 2024-05-25 | End: 2024-05-24

## 2024-05-24 RX ORDER — ASPIRIN 81 MG/1
TABLET, CHEWABLE ORAL
Status: COMPLETED
Start: 2024-05-24 | End: 2024-05-24

## 2024-05-24 RX ORDER — SODIUM CHLORIDE 9 MG/ML
INJECTION, SOLUTION INTRAVENOUS
Status: DISCONTINUED | OUTPATIENT
Start: 2024-05-25 | End: 2024-05-24

## 2024-05-24 RX ORDER — MIDAZOLAM HYDROCHLORIDE 1 MG/ML
INJECTION INTRAMUSCULAR; INTRAVENOUS
Status: COMPLETED
Start: 2024-05-24 | End: 2024-05-24

## 2024-05-24 RX ORDER — SODIUM CHLORIDE 9 MG/ML
INJECTION, SOLUTION INTRAVENOUS CONTINUOUS
Status: DISCONTINUED | OUTPATIENT
Start: 2024-05-24 | End: 2024-05-24

## 2024-05-24 RX ORDER — ASPIRIN 81 MG/1
81 TABLET ORAL DAILY
Status: DISCONTINUED | OUTPATIENT
Start: 2024-05-25 | End: 2024-05-24

## 2024-05-24 RX ORDER — CLOPIDOGREL BISULFATE 75 MG/1
75 TABLET ORAL DAILY
Qty: 30 TABLET | Refills: 5 | Status: SHIPPED | OUTPATIENT
Start: 2024-05-24

## 2024-05-24 RX ORDER — ASPIRIN 81 MG/1
81 TABLET, CHEWABLE ORAL ONCE
Status: COMPLETED | OUTPATIENT
Start: 2024-05-24 | End: 2024-05-24

## 2024-05-24 RX ORDER — NITROGLYCERIN 20 MG/100ML
INJECTION INTRAVENOUS
Status: COMPLETED
Start: 2024-05-24 | End: 2024-05-24

## 2024-05-24 RX ORDER — HEPARIN SODIUM 5000 [USP'U]/ML
INJECTION, SOLUTION INTRAVENOUS; SUBCUTANEOUS
Status: DISCONTINUED
Start: 2024-05-24 | End: 2024-05-24 | Stop reason: WASHOUT

## 2024-05-24 RX ORDER — LIDOCAINE HYDROCHLORIDE 10 MG/ML
INJECTION, SOLUTION EPIDURAL; INFILTRATION; INTRACAUDAL; PERINEURAL
Status: COMPLETED
Start: 2024-05-24 | End: 2024-05-24

## 2024-05-24 RX ORDER — VERAPAMIL HYDROCHLORIDE 2.5 MG/ML
INJECTION, SOLUTION INTRAVENOUS
Status: COMPLETED
Start: 2024-05-24 | End: 2024-05-24

## 2024-05-24 RX ORDER — DIPHENHYDRAMINE HYDROCHLORIDE 50 MG/ML
INJECTION INTRAMUSCULAR; INTRAVENOUS
Status: COMPLETED
Start: 2024-05-24 | End: 2024-05-24

## 2024-05-24 RX ADMIN — ASPIRIN 81 MG: 81 TABLET, CHEWABLE ORAL at 10:30:00

## 2024-05-24 NOTE — PROGRESS NOTES
Pt s/p  with Dr. Gilbert. Pt recovered in holding. Pt wife at bedside. Right wrist with vasc band on, 8cc of air-was removed in recovery per protocol-see bedside flow sheet. Gauze with coban and armbaord applied after. Right groin dressing cdi. +radial and +pedal pulses. Pt flat for 2 hours then HOB elevated. Pt ate and drank. Discharge instructions reviewed with pt and wife, copy given. EKG done. Cardiac rehab saw pt at bedside. After recovery pt out of bed to bathroom and ramos with no complaints. Right wrist and right groin dressing remained cdi, soft. IV's removed. Pt dressed. Pt discharged to [facility] via wheelchair by volunteer. Pt left with belongings. Pt wife drove pt home

## 2024-05-24 NOTE — PROCEDURES
Elective PCI  RCA via RFA / RRA  CHIKIS x 2 with IVUS; 100>0; 0>3  Perclose RFA  TR band RRA    Home today    Follow up with Dr. Burgos

## 2024-05-24 NOTE — PROCEDURES
[facility]    PATIENT'S NAME: MERCY CARTY   ATTENDING PHYSICIAN: Geremias Gilbert MD   OPERATING PHYSICIAN: Geremias Gilbert MD   PATIENT ACCOUNT#:   [id number]    LOCATION:  [address]  MEDICAL RECORD #:   [id number]       YOB: 1960  ADMISSION DATE:       05/24/2024      OPERATION DATE:  05/24/2024    CARDIAC PROCEDURE TRANSCRIPTION      CARDIAC CATHETERIZATION/PERCUTANEOUS CORONARY INTERVENTION    PREOPERATIVE DIAGNOSIS:    1.   Coronary disease.  2.   Non-ST-segment elevation myocardial infarction.   3.   CCS 3 angina.  POSTOPERATIVE DIAGNOSIS:    1.   Coronary disease.  2.   Non-ST-segment elevation myocardial infarction.   3.   CCS 3 angina.  PROCEDURE PERFORMED:    1.   Percutaneous transluminal coronary angioplasty with drug-eluting stent x2 to the chronically totally occluded right coronary artery.  2.   Intravascular ultrasound to the right coronary artery.    PROCEDURE DESCRIPTION:  After obtaining informed consent, we obtained access in the right radial artery.  We then obtained access in the right femoral artery using a micropuncture technique.  An EBU3.5 guide was advanced over a wire and used to engage the left main.  The LAD was wired with a Kahlil blue wire.  An AL.75 guide was advanced over a wire and used to engage the right coronary artery.  Dual angiography was performed.    Initially we came antegrade with the Corsair catheter and a  200 wire.  We were able to penetrate the proximal cap; however, due to multiple side branches, we were unable to maintain in the right coronary artery.  Corsair catheter was brought retrograde with a Kahlil blue into the second septal .  Corsair catheter was advanced.  Kahlil blue was removed and replaced with a Kahlil black.  Kahlil black was used to cross the septal  into the true lumen of the PDA up to the mid right coronary artery.  Corsair catheter was advanced.  Kahlil black was then used to cross the  into the  guide antegrade.  Corsair catheter was advanced.  Kahlil black was removed and replaced with an R350.  R350 was used to externalize.    The right coronary artery was predilated with a 2.5 x 40 balloon, followed buy intravascular ultrasound.  After intravascular ultrasound, it was stented distal to proximal with a 3.0 x 24 and 3.0 x 48 Synergy drug-eluting stents.  The midportion was postdilated with a 3.5 noncompliant balloon.  Proximal portion was postdilated with a 4.0 noncompliant balloon.  Final angiography showed no residual stenosis and GARRETT 3 flow.  Retrograde gear was removed.  Both guides were removed over a wire.  Perclose device was deployed in the right femoral artery for hemostasis.  TR band was placed on the right radial artery.  Patient was transferred to Recovery in stable condition.    COMPLICATIONS:  None.    ESTIMATED BLOOD LOSS:  5 mL.    CONCLUSIONS:  Successful angioplasty of a chronically totally occluded right coronary artery with reduction of stenosis from 100% to 0% and improvement of flow from GARRETT 0 to GARRETT 3 with IVUS guidance.    Dictated By Geremias Gilbert MD  d: 05/24/2024 12:25:27  t: 05/24/2024 14:14:01  Job [id number]  TD/

## 2024-05-24 NOTE — DISCHARGE INSTRUCTIONS
Resume coumadin tonight as long as you do not bleed    Check your coumadin level at home on Tuesday 5/28/2024 and call your results to your hematologist who now regulates your coumadin    Take aspirin 81mg one tab daily Saturday 5/25/2024, Sunday 5/26/2024 and Monday 5/27/2024 then STOP TAKING    Continue taking plavix. A 6 month supply was sent to your pharmacy to

## 2024-05-28 ENCOUNTER — ANTI-COAG VISIT (OUTPATIENT)
Dept: HEMATOLOGY/ONCOLOGY | Facility: HOSPITAL | Age: 64
End: 2024-05-28

## 2024-05-28 ENCOUNTER — TELEPHONE (OUTPATIENT)
Dept: HEMATOLOGY/ONCOLOGY | Age: 64
End: 2024-05-28

## 2024-05-28 LAB — INR: 1.2 (ref 0.8–1.2)

## 2024-05-30 DIAGNOSIS — I26.99 OTHER PULMONARY EMBOLISM WITHOUT ACUTE COR PULMONALE (HCC): ICD-10-CM

## 2024-05-31 ENCOUNTER — ANTI-COAG VISIT (OUTPATIENT)
Dept: HEMATOLOGY/ONCOLOGY | Facility: HOSPITAL | Age: 64
End: 2024-05-31

## 2024-05-31 ENCOUNTER — TELEPHONE (OUTPATIENT)
Dept: HEMATOLOGY/ONCOLOGY | Age: 64
End: 2024-05-31

## 2024-05-31 LAB — INR: 1.5 (ref 0.8–1.2)

## 2024-05-31 RX ORDER — WARFARIN SODIUM 5 MG/1
TABLET ORAL
Qty: 135 TABLET | Refills: 0 | Status: SHIPPED | OUTPATIENT
Start: 2024-05-31

## 2024-06-07 ENCOUNTER — ANTI-COAG VISIT (OUTPATIENT)
Dept: HEMATOLOGY/ONCOLOGY | Facility: HOSPITAL | Age: 64
End: 2024-06-07

## 2024-06-07 ENCOUNTER — TELEPHONE (OUTPATIENT)
Dept: HEMATOLOGY/ONCOLOGY | Facility: HOSPITAL | Age: 64
End: 2024-06-07

## 2024-06-07 LAB — INR: 2.5 (ref 0.8–1.2)

## 2024-06-18 ENCOUNTER — ORDER TRANSCRIPTION (OUTPATIENT)
Dept: CARDIAC REHAB | Facility: HOSPITAL | Age: 64
End: 2024-06-18

## 2024-06-18 DIAGNOSIS — Z95.5 STENTED CORONARY ARTERY: Primary | ICD-10-CM

## 2024-06-27 ENCOUNTER — CARDPULM VISIT (OUTPATIENT)
Dept: CARDIAC REHAB | Facility: HOSPITAL | Age: 64
End: 2024-06-27
Attending: INTERNAL MEDICINE

## 2024-07-01 ENCOUNTER — ANTI-COAG VISIT (OUTPATIENT)
Dept: HEMATOLOGY/ONCOLOGY | Facility: HOSPITAL | Age: 64
End: 2024-07-01

## 2024-07-01 ENCOUNTER — CARDPULM VISIT (OUTPATIENT)
Dept: CARDIAC REHAB | Facility: HOSPITAL | Age: 64
End: 2024-07-01
Attending: INTERNAL MEDICINE
Payer: COMMERCIAL

## 2024-07-01 LAB — INR: 2.5 (ref 0.8–1.2)

## 2024-07-01 PROCEDURE — 93798 PHYS/QHP OP CAR RHAB W/ECG: CPT

## 2024-07-03 ENCOUNTER — APPOINTMENT (OUTPATIENT)
Dept: CARDIAC REHAB | Facility: HOSPITAL | Age: 64
End: 2024-07-03
Attending: INTERNAL MEDICINE
Payer: COMMERCIAL

## 2024-07-08 ENCOUNTER — APPOINTMENT (OUTPATIENT)
Dept: CARDIAC REHAB | Facility: HOSPITAL | Age: 64
End: 2024-07-08
Attending: INTERNAL MEDICINE
Payer: COMMERCIAL

## 2024-07-10 ENCOUNTER — CARDPULM VISIT (OUTPATIENT)
Dept: CARDIAC REHAB | Facility: HOSPITAL | Age: 64
End: 2024-07-10
Attending: INTERNAL MEDICINE
Payer: COMMERCIAL

## 2024-07-10 PROCEDURE — 93798 PHYS/QHP OP CAR RHAB W/ECG: CPT

## 2024-07-11 ENCOUNTER — CARDPULM VISIT (OUTPATIENT)
Dept: CARDIAC REHAB | Facility: HOSPITAL | Age: 64
End: 2024-07-11
Attending: INTERNAL MEDICINE
Payer: COMMERCIAL

## 2024-07-11 PROCEDURE — 93798 PHYS/QHP OP CAR RHAB W/ECG: CPT

## 2024-07-15 ENCOUNTER — CARDPULM VISIT (OUTPATIENT)
Dept: CARDIAC REHAB | Facility: HOSPITAL | Age: 64
End: 2024-07-15
Attending: INTERNAL MEDICINE
Payer: COMMERCIAL

## 2024-07-15 ENCOUNTER — ANTI-COAG VISIT (OUTPATIENT)
Dept: HEMATOLOGY/ONCOLOGY | Facility: HOSPITAL | Age: 64
End: 2024-07-15

## 2024-07-15 LAB — INR: 3.1 (ref 0.8–1.2)

## 2024-07-15 PROCEDURE — 93798 PHYS/QHP OP CAR RHAB W/ECG: CPT

## 2024-07-17 ENCOUNTER — APPOINTMENT (OUTPATIENT)
Dept: CARDIAC REHAB | Facility: HOSPITAL | Age: 64
End: 2024-07-17
Attending: INTERNAL MEDICINE
Payer: COMMERCIAL

## 2024-07-18 ENCOUNTER — CARDPULM VISIT (OUTPATIENT)
Dept: CARDIAC REHAB | Facility: HOSPITAL | Age: 64
End: 2024-07-18
Attending: INTERNAL MEDICINE
Payer: COMMERCIAL

## 2024-07-18 PROCEDURE — 93798 PHYS/QHP OP CAR RHAB W/ECG: CPT

## 2024-07-22 ENCOUNTER — CARDPULM VISIT (OUTPATIENT)
Dept: CARDIAC REHAB | Facility: HOSPITAL | Age: 64
End: 2024-07-22
Attending: INTERNAL MEDICINE
Payer: COMMERCIAL

## 2024-07-22 PROCEDURE — 93798 PHYS/QHP OP CAR RHAB W/ECG: CPT

## 2024-07-24 ENCOUNTER — CARDPULM VISIT (OUTPATIENT)
Dept: CARDIAC REHAB | Facility: HOSPITAL | Age: 64
End: 2024-07-24
Attending: INTERNAL MEDICINE
Payer: COMMERCIAL

## 2024-07-24 PROCEDURE — 93798 PHYS/QHP OP CAR RHAB W/ECG: CPT

## 2024-07-25 ENCOUNTER — APPOINTMENT (OUTPATIENT)
Dept: CARDIAC REHAB | Facility: HOSPITAL | Age: 64
End: 2024-07-25
Attending: INTERNAL MEDICINE
Payer: COMMERCIAL

## 2024-07-29 ENCOUNTER — ANTI-COAG VISIT (OUTPATIENT)
Dept: HEMATOLOGY/ONCOLOGY | Facility: HOSPITAL | Age: 64
End: 2024-07-29

## 2024-07-29 ENCOUNTER — APPOINTMENT (OUTPATIENT)
Dept: CARDIAC REHAB | Facility: HOSPITAL | Age: 64
End: 2024-07-29
Attending: INTERNAL MEDICINE
Payer: COMMERCIAL

## 2024-07-29 LAB — INR: 2.6 (ref 0.8–1.2)

## 2024-07-31 ENCOUNTER — APPOINTMENT (OUTPATIENT)
Dept: CARDIAC REHAB | Facility: HOSPITAL | Age: 64
End: 2024-07-31
Attending: INTERNAL MEDICINE
Payer: COMMERCIAL

## 2024-08-01 ENCOUNTER — APPOINTMENT (OUTPATIENT)
Dept: CARDIAC REHAB | Facility: HOSPITAL | Age: 64
End: 2024-08-01
Attending: INTERNAL MEDICINE
Payer: COMMERCIAL

## 2024-08-02 ENCOUNTER — TELEPHONE (OUTPATIENT)
Dept: FAMILY MEDICINE CLINIC | Facility: CLINIC | Age: 64
End: 2024-08-02

## 2024-08-02 DIAGNOSIS — I25.85 CHRONIC CORONARY MICROVASCULAR DYSFUNCTION: ICD-10-CM

## 2024-08-02 DIAGNOSIS — Z86.718 HISTORY OF DVT (DEEP VEIN THROMBOSIS): ICD-10-CM

## 2024-08-02 DIAGNOSIS — E04.1 THYROID NODULE: Primary | ICD-10-CM

## 2024-08-02 DIAGNOSIS — D69.6 THROMBOCYTOPENIA (HCC): ICD-10-CM

## 2024-08-02 NOTE — TELEPHONE ENCOUNTER
Patient is requesting lab orders, states he had cardiac surgery 6 weeks ago and cardiology recommends CMP & lipids    Would you like other lab work?    LOV 8/19/23  Upcoming appt 8/20/24

## 2024-08-02 NOTE — TELEPHONE ENCOUNTER
Pt called and requested an order for labs. Pt had cardiac surgery within the past 6 weeks and discussed this with his cardiologist- this is what they suggested.   - Lipids  - CMP   For VTEX.

## 2024-08-05 ENCOUNTER — APPOINTMENT (OUTPATIENT)
Dept: CARDIAC REHAB | Facility: HOSPITAL | Age: 64
End: 2024-08-05
Attending: INTERNAL MEDICINE
Payer: COMMERCIAL

## 2024-08-07 ENCOUNTER — APPOINTMENT (OUTPATIENT)
Dept: CARDIAC REHAB | Facility: HOSPITAL | Age: 64
End: 2024-08-07
Attending: INTERNAL MEDICINE
Payer: COMMERCIAL

## 2024-08-08 ENCOUNTER — APPOINTMENT (OUTPATIENT)
Dept: CARDIAC REHAB | Facility: HOSPITAL | Age: 64
End: 2024-08-08
Attending: INTERNAL MEDICINE
Payer: COMMERCIAL

## 2024-08-12 ENCOUNTER — APPOINTMENT (OUTPATIENT)
Dept: CARDIAC REHAB | Facility: HOSPITAL | Age: 64
End: 2024-08-12
Attending: INTERNAL MEDICINE
Payer: COMMERCIAL

## 2024-08-12 ENCOUNTER — ANTI-COAG VISIT (OUTPATIENT)
Dept: HEMATOLOGY/ONCOLOGY | Facility: HOSPITAL | Age: 64
End: 2024-08-12

## 2024-08-12 LAB — INR: 2.5 (ref 0.8–1.2)

## 2024-08-14 ENCOUNTER — APPOINTMENT (OUTPATIENT)
Dept: CARDIAC REHAB | Facility: HOSPITAL | Age: 64
End: 2024-08-14
Attending: INTERNAL MEDICINE
Payer: COMMERCIAL

## 2024-08-14 LAB
ABSOLUTE BASOPHILS: 31 CELLS/UL (ref 0–200)
ABSOLUTE EOSINOPHILS: 165 CELLS/UL (ref 15–500)
ABSOLUTE LYMPHOCYTES: 3018 CELLS/UL (ref 850–3900)
ABSOLUTE MONOCYTES: 834 CELLS/UL (ref 200–950)
ABSOLUTE NEUTROPHILS: 6252 CELLS/UL (ref 1500–7800)
ALBUMIN/GLOBULIN RATIO: 1.5 (CALC) (ref 1–2.5)
ALBUMIN: 4.5 G/DL (ref 3.6–5.1)
ALKALINE PHOSPHATASE: 46 U/L (ref 35–144)
ALT: 15 U/L (ref 9–46)
AST: 17 U/L (ref 10–35)
BASOPHILS: 0.3 %
BILIRUBIN, TOTAL: 0.5 MG/DL (ref 0.2–1.2)
BUN: 13 MG/DL (ref 7–25)
CALCIUM: 9.7 MG/DL (ref 8.6–10.3)
CARBON DIOXIDE: 27 MMOL/L (ref 20–32)
CHLORIDE: 105 MMOL/L (ref 98–110)
CHOL/HDLC RATIO: 2.4 (CALC)
CHOLESTEROL, TOTAL: 128 MG/DL
CREATININE: 0.94 MG/DL (ref 0.7–1.35)
EGFR: 90 ML/MIN/1.73M2
EOSINOPHILS: 1.6 %
GLOBULIN: 3.1 G/DL (CALC) (ref 1.9–3.7)
GLUCOSE: 82 MG/DL (ref 65–99)
HDL CHOLESTEROL: 53 MG/DL
HEMATOCRIT: 39.4 % (ref 38.5–50)
HEMOGLOBIN A1C: 5.9 % OF TOTAL HGB
HEMOGLOBIN: 13.1 G/DL (ref 13.2–17.1)
LDL-CHOLESTEROL: 52 MG/DL (CALC)
LYMPHOCYTES: 29.3 %
MCH: 32.7 PG (ref 27–33)
MCHC: 33.2 G/DL (ref 32–36)
MCV: 98.3 FL (ref 80–100)
MONOCYTES: 8.1 %
MPV: 10.9 FL (ref 7.5–12.5)
NEUTROPHILS: 60.7 %
NON-HDL CHOLESTEROL: 75 MG/DL (CALC)
PLATELET COUNT: 251 THOUSAND/UL (ref 140–400)
POTASSIUM: 4 MMOL/L (ref 3.5–5.3)
PROTEIN, TOTAL: 7.6 G/DL (ref 6.1–8.1)
RDW: 12.6 % (ref 11–15)
RED BLOOD CELL COUNT: 4.01 MILLION/UL (ref 4.2–5.8)
SODIUM: 144 MMOL/L (ref 135–146)
TRIGLYCERIDES: 146 MG/DL
TSH W/REFLEX TO FT4: 0.63 MIU/L (ref 0.4–4.5)
WHITE BLOOD CELL COUNT: 10.3 THOUSAND/UL (ref 3.8–10.8)

## 2024-08-15 ENCOUNTER — APPOINTMENT (OUTPATIENT)
Dept: CARDIAC REHAB | Facility: HOSPITAL | Age: 64
End: 2024-08-15
Attending: INTERNAL MEDICINE
Payer: COMMERCIAL

## 2024-08-19 ENCOUNTER — APPOINTMENT (OUTPATIENT)
Dept: CARDIAC REHAB | Facility: HOSPITAL | Age: 64
End: 2024-08-19
Attending: INTERNAL MEDICINE
Payer: COMMERCIAL

## 2024-08-21 ENCOUNTER — TELEPHONE (OUTPATIENT)
Dept: FAMILY MEDICINE CLINIC | Facility: CLINIC | Age: 64
End: 2024-08-21

## 2024-08-21 ENCOUNTER — APPOINTMENT (OUTPATIENT)
Dept: CARDIAC REHAB | Facility: HOSPITAL | Age: 64
End: 2024-08-21
Attending: INTERNAL MEDICINE
Payer: COMMERCIAL

## 2024-08-21 DIAGNOSIS — E04.1 THYROID NODULE: Primary | ICD-10-CM

## 2024-08-21 NOTE — TELEPHONE ENCOUNTER
----- Message from Willie Hanna sent at 8/19/2024  2:58 PM CDT -----  Stable levels, recheck in 6 months.

## 2024-08-22 ENCOUNTER — APPOINTMENT (OUTPATIENT)
Dept: CARDIAC REHAB | Facility: HOSPITAL | Age: 64
End: 2024-08-22
Attending: INTERNAL MEDICINE
Payer: COMMERCIAL

## 2024-08-26 ENCOUNTER — APPOINTMENT (OUTPATIENT)
Dept: CARDIAC REHAB | Facility: HOSPITAL | Age: 64
End: 2024-08-26
Attending: INTERNAL MEDICINE
Payer: COMMERCIAL

## 2024-08-26 ENCOUNTER — ANTI-COAG VISIT (OUTPATIENT)
Dept: HEMATOLOGY/ONCOLOGY | Facility: HOSPITAL | Age: 64
End: 2024-08-26

## 2024-08-26 ENCOUNTER — TELEPHONE (OUTPATIENT)
Dept: FAMILY MEDICINE CLINIC | Facility: CLINIC | Age: 64
End: 2024-08-26

## 2024-08-26 LAB — INR: 2.8 (ref 0.8–1.2)

## 2024-08-28 ENCOUNTER — APPOINTMENT (OUTPATIENT)
Dept: CARDIAC REHAB | Facility: HOSPITAL | Age: 64
End: 2024-08-28
Attending: INTERNAL MEDICINE
Payer: COMMERCIAL

## 2024-08-29 ENCOUNTER — APPOINTMENT (OUTPATIENT)
Dept: CARDIAC REHAB | Facility: HOSPITAL | Age: 64
End: 2024-08-29
Attending: INTERNAL MEDICINE
Payer: COMMERCIAL

## 2024-09-04 ENCOUNTER — APPOINTMENT (OUTPATIENT)
Dept: CARDIAC REHAB | Facility: HOSPITAL | Age: 64
End: 2024-09-04
Attending: INTERNAL MEDICINE
Payer: COMMERCIAL

## 2024-09-05 ENCOUNTER — APPOINTMENT (OUTPATIENT)
Dept: CARDIAC REHAB | Facility: HOSPITAL | Age: 64
End: 2024-09-05
Attending: INTERNAL MEDICINE
Payer: COMMERCIAL

## 2024-09-09 ENCOUNTER — APPOINTMENT (OUTPATIENT)
Dept: CARDIAC REHAB | Facility: HOSPITAL | Age: 64
End: 2024-09-09
Attending: INTERNAL MEDICINE
Payer: COMMERCIAL

## 2024-09-09 ENCOUNTER — ANTI-COAG VISIT (OUTPATIENT)
Dept: HEMATOLOGY/ONCOLOGY | Facility: HOSPITAL | Age: 64
End: 2024-09-09

## 2024-09-09 LAB — INR: 3.7 (ref 0.8–1.2)

## 2024-09-11 ENCOUNTER — APPOINTMENT (OUTPATIENT)
Dept: CARDIAC REHAB | Facility: HOSPITAL | Age: 64
End: 2024-09-11
Attending: INTERNAL MEDICINE
Payer: COMMERCIAL

## 2024-09-12 ENCOUNTER — APPOINTMENT (OUTPATIENT)
Dept: CARDIAC REHAB | Facility: HOSPITAL | Age: 64
End: 2024-09-12
Attending: INTERNAL MEDICINE
Payer: COMMERCIAL

## 2024-09-16 ENCOUNTER — APPOINTMENT (OUTPATIENT)
Dept: CARDIAC REHAB | Facility: HOSPITAL | Age: 64
End: 2024-09-16
Attending: INTERNAL MEDICINE
Payer: COMMERCIAL

## 2024-09-16 ENCOUNTER — ANTI-COAG VISIT (OUTPATIENT)
Dept: HEMATOLOGY/ONCOLOGY | Facility: HOSPITAL | Age: 64
End: 2024-09-16

## 2024-09-16 LAB — INR: 2.4 (ref 0.8–1.2)

## 2024-09-18 ENCOUNTER — APPOINTMENT (OUTPATIENT)
Dept: CARDIAC REHAB | Facility: HOSPITAL | Age: 64
End: 2024-09-18
Attending: INTERNAL MEDICINE
Payer: COMMERCIAL

## 2024-09-19 ENCOUNTER — APPOINTMENT (OUTPATIENT)
Dept: CARDIAC REHAB | Facility: HOSPITAL | Age: 64
End: 2024-09-19
Attending: INTERNAL MEDICINE
Payer: COMMERCIAL

## 2024-09-19 NOTE — TELEPHONE ENCOUNTER
Pt called requesting refill of gemfibrozil  Pt states this refill request was denied as pt needs appt  Pt states he had an appt on 08/20 but it was rescheduled to 09/24  Pt verbalized will keep upcoming appt  Please approve or deny pending Rx

## 2024-09-20 RX ORDER — GEMFIBROZIL 600 MG/1
600 TABLET, FILM COATED ORAL
Qty: 180 TABLET | Refills: 1 | Status: SHIPPED | OUTPATIENT
Start: 2024-09-20

## 2024-09-23 ENCOUNTER — APPOINTMENT (OUTPATIENT)
Dept: CARDIAC REHAB | Facility: HOSPITAL | Age: 64
End: 2024-09-23
Attending: INTERNAL MEDICINE
Payer: COMMERCIAL

## 2024-09-24 ENCOUNTER — OFFICE VISIT (OUTPATIENT)
Dept: FAMILY MEDICINE CLINIC | Facility: CLINIC | Age: 64
End: 2024-09-24
Payer: COMMERCIAL

## 2024-09-24 VITALS
BODY MASS INDEX: 35.28 KG/M2 | OXYGEN SATURATION: 98 % | TEMPERATURE: 98 F | HEART RATE: 66 BPM | RESPIRATION RATE: 18 BRPM | SYSTOLIC BLOOD PRESSURE: 136 MMHG | HEIGHT: 71 IN | WEIGHT: 252 LBS | DIASTOLIC BLOOD PRESSURE: 78 MMHG

## 2024-09-24 DIAGNOSIS — Z00.00 ANNUAL PHYSICAL EXAM: Primary | ICD-10-CM

## 2024-09-24 DIAGNOSIS — Z12.11 SCREENING FOR COLON CANCER: ICD-10-CM

## 2024-09-24 DIAGNOSIS — I25.85 CHRONIC CORONARY MICROVASCULAR DYSFUNCTION: ICD-10-CM

## 2024-09-24 DIAGNOSIS — Z95.5 S/P DRUG ELUTING CORONARY STENT PLACEMENT: ICD-10-CM

## 2024-09-24 DIAGNOSIS — Z12.5 SCREENING FOR PROSTATE CANCER: ICD-10-CM

## 2024-09-24 DIAGNOSIS — F43.21 GRIEF REACTION: ICD-10-CM

## 2024-09-24 DIAGNOSIS — K21.00 GASTROESOPHAGEAL REFLUX DISEASE WITH ESOPHAGITIS WITHOUT HEMORRHAGE: ICD-10-CM

## 2024-09-24 DIAGNOSIS — D69.6 THROMBOCYTOPENIA (HCC): ICD-10-CM

## 2024-09-24 PROBLEM — R79.89 TROPONIN LEVEL ELEVATED: Status: RESOLVED | Noted: 2024-05-07 | Resolved: 2024-09-24

## 2024-09-24 PROBLEM — I20.0 UNSTABLE ANGINA (HCC): Status: RESOLVED | Noted: 2024-05-07 | Resolved: 2024-09-24

## 2024-09-24 PROBLEM — R07.9 ACUTE CHEST PAIN: Status: RESOLVED | Noted: 2024-05-07 | Resolved: 2024-09-24

## 2024-09-24 PROCEDURE — 99214 OFFICE O/P EST MOD 30 MIN: CPT | Performed by: FAMILY MEDICINE

## 2024-09-24 PROCEDURE — 99397 PER PM REEVAL EST PAT 65+ YR: CPT | Performed by: FAMILY MEDICINE

## 2024-09-24 PROCEDURE — 3078F DIAST BP <80 MM HG: CPT | Performed by: FAMILY MEDICINE

## 2024-09-24 PROCEDURE — 3008F BODY MASS INDEX DOCD: CPT | Performed by: FAMILY MEDICINE

## 2024-09-24 PROCEDURE — 3075F SYST BP GE 130 - 139MM HG: CPT | Performed by: FAMILY MEDICINE

## 2024-09-25 ENCOUNTER — APPOINTMENT (OUTPATIENT)
Dept: CARDIAC REHAB | Facility: HOSPITAL | Age: 64
End: 2024-09-25
Attending: INTERNAL MEDICINE
Payer: COMMERCIAL

## 2024-09-25 NOTE — PROGRESS NOTES
HPI:    Mitchell Quintanilla is a 63 year old male who presents for Physical (Post PCI in may 2024 / f/u- no concerns)     Presenting for wellness visit.     Patient had cardiac event with history of bilateral PE and DVT 2015, was on anticoagulation, s/p stent placement.   S/p angioplasty right coronary artery with reduction of stenosis CHIKIS x 2.   BP is WNL.   On plavix and coumadin.   On statin and ppi.         Past History:   He  has a past medical history of Arthritis, Bronchitis, not specified as acute or chronic, Coronary atherosclerosis, Cough, Current use of long term anticoagulation (09/14/2018), DVT (deep venous thrombosis) (HCC) (11/2015), Gastric reflux, Hernia of unspecified site of abdominal cavity without mention of obstruction or gangrene, High cholesterol, Hyperlipidemia, Obesity, PE (pulmonary embolism) (11/2015), and Screening for malignant neoplasm of colon (10/05/2018).   He  has a past surgical history that includes knee arthroscp harv (2/09); vasectomy (89); tonsillectomy (61); and back surgery.   His family history includes Cancer in his father; Diabetes in his father; Hypertension in his mother; colon cancer in his father; diabetes mellitus in his father.   He  reports that he has never smoked. He has never used smokeless tobacco. He reports that he does not drink alcohol and does not use drugs.     He has a current medication list which includes the following long-term medication(s): pantoprazole.   He is allergic to darvocet [propoxyphene n-apap].     Current Outpatient Medications on File Prior to Visit   Medication Sig    gemfibrozil 600 MG Oral Tab Take 1 tablet (600 mg total) by mouth 2 (two) times daily before meals.    warfarin 5 MG Oral Tab TAKE 1 TO 1 AND 1/2 TABLETS BY MOUTH DAILY AS DIRECTED    clopidogrel 75 MG Oral Tab Take 1 tablet (75 mg total) by mouth daily.    rosuvastatin 10 MG Oral Tab Take 1 tablet (10 mg total) by mouth nightly.    Multiple Vitamin (MULTI-VITAMIN DAILY) Oral  Tab Take 1 tablet by mouth daily.    Vitamin C 500 MG Oral Tab Take 1 tablet (500 mg total) by mouth daily.    pantoprazole 40 MG Oral Tab EC Take 1 tablet (40 mg total) by mouth daily.    Acetaminophen (ARTHRITIS PAIN OR) Take by mouth.    Cholecalciferol (VITAMIN D3) 2000 units Oral Tab Take by mouth.    isosorbide mononitrate ER 30 MG Oral Tablet 24 Hr Take 0.5 tablets (15 mg total) by mouth every evening. (Patient not taking: Reported on 9/24/2024)     No current facility-administered medications on file prior to visit.         REVIEW OF SYSTEMS:   Patient denies shortness of breath, denies chest pain and denies any recent fevers or chills.    Patient reports no urinary complaints and denies headaches or visual disturbances.   Patient denies any abdominal pain at this time. Patient has no new skin lesions.  Patient reports no acute back pain and reports no dizziness or headaches.   Patient reports no visual disturbances and reports hearing has been about the same.   Patient reports no recent injury or trauma.               EXAM:    /78   Pulse 66   Temp 98.1 °F (36.7 °C)   Resp 18   Ht 5' 11\" (1.803 m)   Wt 252 lb (114.3 kg)   SpO2 98%   BMI 35.15 kg/m²  Estimated body mass index is 35.15 kg/m² as calculated from the following:    Height as of this encounter: 5' 11\" (1.803 m).    Weight as of this encounter: 252 lb (114.3 kg).    General Appearance:  Alert, cooperative, no distress, appears stated age   Head:  Normocephalic, without obvious abnormality, atraumatic   Eyes:  conjunctiva/cornea is not erythematous.        Nose: No nasal drainage.    Throat: No erythema    Neck: Supple, symmetrical, trachea midline, and normal ROM  thyroid: no obvious nodules   Back:   Symmetric, no curvature, ROM normal, no CVA tenderness   Lungs:   Clear to auscultation bilaterally, respirations unlabored   Chest Wall:  No tenderness or deformity   Heart:  Regular rate and rhythm, S1, S2 normal, no murmur,   Abdomen:    Soft, non-tender, bowel sounds active. No hernia.    Genitalia:     Rectal:     Extremities: Extremities normal, atraumatic, no cyanosis or edema   Pulses: 2+ and symmetric   Skin: Skin color, texture, turgor normal, no new rashes    Lymph nodes: No obvious cervical adenopathy.    Neurologic and psych: Normal speech, Alert and oriented x 3.   Normal mood, normal insight and judgment.                    ASSESSMENT AND PLAN:     1. Annual physical exam  -wellness visit was done.     2. Screening for prostate cancer    - PSA Total, Screen; Future    3. Screening for colon cancer    - Surgery Referral - In Network    4. Thrombocytopenia (HCC)      5. Gastroesophageal reflux disease with esophagitis without hemorrhage  Stable, on ppi    6. Grief reaction  -loss of wife and mom this summer, deferred counseling for now.     7. Chronic coronary microvascular dysfunction  -s/p CHIKIS x 2     8. S/P drug eluting coronary stent placement  -on plavix and coumadin, seeing NELSY Hanna MD, 9/24/2024, 9:30 PM     Note to patient: The 21st Century Cures Act makes medical notes like these available to patients in the interest of transparency. However, this is a medical document intended as peer to peer communication. It is written in medical language and may contain abbreviations or verbiage that are unfamiliar. It may appear blunt or direct. Medical documents are intended to carry relevant information, facts as evident, and the clinical opinion of the practitioner who signs the document.

## 2024-09-26 ENCOUNTER — APPOINTMENT (OUTPATIENT)
Dept: CARDIAC REHAB | Facility: HOSPITAL | Age: 64
End: 2024-09-26
Attending: INTERNAL MEDICINE
Payer: COMMERCIAL

## 2024-09-30 ENCOUNTER — ANTI-COAG VISIT (OUTPATIENT)
Dept: HEMATOLOGY/ONCOLOGY | Facility: HOSPITAL | Age: 64
End: 2024-09-30

## 2024-09-30 ENCOUNTER — APPOINTMENT (OUTPATIENT)
Dept: CARDIAC REHAB | Facility: HOSPITAL | Age: 64
End: 2024-09-30
Attending: INTERNAL MEDICINE
Payer: COMMERCIAL

## 2024-09-30 LAB — INR: 2.9 (ref 0.8–1.2)

## 2024-10-02 ENCOUNTER — APPOINTMENT (OUTPATIENT)
Dept: CARDIAC REHAB | Facility: HOSPITAL | Age: 64
End: 2024-10-02
Attending: INTERNAL MEDICINE

## 2024-10-08 ENCOUNTER — OFFICE VISIT (OUTPATIENT)
Facility: LOCATION | Age: 64
End: 2024-10-08
Payer: COMMERCIAL

## 2024-10-08 VITALS
HEART RATE: 56 BPM | DIASTOLIC BLOOD PRESSURE: 87 MMHG | RESPIRATION RATE: 18 BRPM | OXYGEN SATURATION: 96 % | TEMPERATURE: 97 F | SYSTOLIC BLOOD PRESSURE: 138 MMHG

## 2024-10-08 DIAGNOSIS — Z12.11 SCREEN FOR COLON CANCER: Primary | ICD-10-CM

## 2024-10-08 RX ORDER — SOD SULF/POT CHLORIDE/MAG SULF 1.479 G
TABLET ORAL
Qty: 24 TABLET | Refills: 0 | Status: SHIPPED | OUTPATIENT
Start: 2024-10-08

## 2024-10-08 NOTE — H&P
Mitchell Quintanilla is a 63 year old male  Chief Complaint   Patient presents with    Colonoscopy     NP- Cscope Consult- last cscope around 7 years ago w/Dr. Mace, family hx of colon cancer, denies symptoms        REFERRED BY    Patient presents for colonoscopy.  Patient states he has had 3 prior colonoscopies by Dr. Sepulveda.  He states that he does have a personal history of colon polyps.  And possibly a positive family history, father with history of colon cancer.  Patient denies change in bowel habits chronic abdominal pain or unexplained weight loss.  Patient is soon to retire in the next week.  Patient takes Coumadin due to DVT and he has a history of coronary artery disease is cardiologist is Dr. Burgos       .           Past Medical History:    Arthritis    Bronchitis, not specified as acute or chronic    Coronary atherosclerosis    Cough    Current use of long term anticoagulation    DVT (deep venous thrombosis) (HCC)    Gastric reflux    Hernia of unspecified site of abdominal cavity without mention of obstruction or gangrene    High cholesterol    Hyperlipidemia    Obesity    PE (pulmonary embolism)    Screening for malignant neoplasm of colon     Past Surgical History:   Procedure Laterality Date    Back surgery      Colonoscopy      Knee arthroscp harv  02/2009    Tonsillectomy  1963    Vasectomy  1994     Current Outpatient Medications on File Prior to Visit   Medication Sig Dispense Refill    gemfibrozil 600 MG Oral Tab Take 1 tablet (600 mg total) by mouth 2 (two) times daily before meals. 180 tablet 1    warfarin 5 MG Oral Tab TAKE 1 TO 1 AND 1/2 TABLETS BY MOUTH DAILY AS DIRECTED 135 tablet 0    clopidogrel 75 MG Oral Tab Take 1 tablet (75 mg total) by mouth daily. 30 tablet 5    isosorbide mononitrate ER 30 MG Oral Tablet 24 Hr Take 0.5 tablets (15 mg total) by mouth every evening. (Patient not taking: Reported on 9/24/2024) 30 tablet 3    rosuvastatin 10 MG Oral Tab Take 1 tablet (10 mg total) by  mouth nightly. 30 tablet 3    Multiple Vitamin (MULTI-VITAMIN DAILY) Oral Tab Take 1 tablet by mouth daily.      Vitamin C 500 MG Oral Tab Take 1 tablet (500 mg total) by mouth daily.      pantoprazole 40 MG Oral Tab EC Take 1 tablet (40 mg total) by mouth daily. 90 tablet 2    Acetaminophen (ARTHRITIS PAIN OR) Take by mouth.      Cholecalciferol (VITAMIN D3) 2000 units Oral Tab Take by mouth.       No current facility-administered medications on file prior to visit.     @ALL@  Family History   Problem Relation Age of Onset    Other (colon cancer) Father     Other (diabetes mellitus) Father     Cancer Father     Diabetes Father     Hypertension Mother      Social History     Socioeconomic History    Marital status:    Tobacco Use    Smoking status: Never    Smokeless tobacco: Never   Substance and Sexual Activity    Alcohol use: No     Comment: NEVER    Drug use: No   Other Topics Concern    Caffeine Concern No    Stress Concern No    Weight Concern Yes    Special Diet No    Exercise No    Seat Belt Yes     Social Determinants of Health     Financial Resource Strain: Low Risk  (5/10/2024)    Financial Resource Strain     Difficulty of Paying Living Expenses: Not very hard   Food Insecurity: No Food Insecurity (5/7/2024)    Food Insecurity     Food Insecurity: Never true   Transportation Needs: No Transportation Needs (5/10/2024)    Transportation Needs     Lack of Transportation: No   Housing Stability: Low Risk  (5/7/2024)    Housing Stability     Housing Instability: No       ROS     GENERAL HEALTH: otherwise feels well, no weight loss, no fever or chills  SKIN: denies any unusual skin rashes or jaundice  HEENT: denies nasal congestion, sinus pain or sore throat; hearing loss negative,   EYES , no diplopia or vision changes  RESPIRATORY: denies shortness of breath, wheezing or cough   CARDIOVASCULAR: denies chest pain or CAMPBELL; no palpitations   GI: denies nausea, vomiting, constipation, diarrhea; no rectal  bleeding; no heartburn  GENITAL/: no dysuria, urgency or frequency, no tea colored urine  MUSCULOSKELETAL: no joint complaints upper or lower extremities  HEMATOLOGY: denies hx anemia; denies bruising or excessive bleeding  Endocrine: no weight gain or loss no hot or cold intolerance    EXAM     Blood pressure 138/87, pulse 56, temperature 96.5 °F (35.8 °C), temperature source Temporal, resp. rate 18, SpO2 96%.  GENERAL: well developed, well nourished male, in no apparent distress.    MENTAL STATUS :Alert, oriented x 3  PSYCH: normal mood and affect  SKIN: anicteric, no rashes, no bruising  EYES: PERRLA, EOMI, sclera anicteric,  conjunctiva without pallor  HEENT: normocephalic, atraumatic, TMs clear, nares patent, mouth moist, pharynx w/o erythema  NECK: supple, no JVD, no adenopathy, no thyromegaly  RESPIRATORY: clear to auscultation, no rales , rhonchi or wheezing  CARDIOVASCULAR: RRR, murmur negative  ABDOMEN: normal active BS, soft, nondistended  no HSM, no masses or hernias  LYMPHATIC: no axillary , supraclavicular or inguinal lymphadenopathy  EXTREMITIES: no cyanosis, clubbing or edema, no atrophy, full ROM    STUDIES:     Anti-coag visit on 09/30/2024   Component Date Value Ref Range Status    INR 09/30/2024 2.9 (A)  0.8 - 1.2 Final       ASSESSMENT   Imp: History of colon polyps, screening colonoscopy, patient on Coumadin will have Dr. Smith give clearance for Coumadin and Dr. Burgos for cardiac clearance.  PLan: Colonoscopy patient is aware of risks of bleeding and bowel perforation with surgery to repair      Meds & Refills for this Visit:  Requested Prescriptions      No prescriptions requested or ordered in this encounter       Imaging & Consults:  None

## 2024-10-12 LAB — INR: 1.9 (ref 2–3)

## 2024-10-14 ENCOUNTER — ANTI-COAG VISIT (OUTPATIENT)
Dept: HEMATOLOGY/ONCOLOGY | Facility: HOSPITAL | Age: 64
End: 2024-10-14

## 2024-10-27 DIAGNOSIS — K21.00 REFLUX ESOPHAGITIS: ICD-10-CM

## 2024-10-28 ENCOUNTER — ANTI-COAG VISIT (OUTPATIENT)
Dept: HEMATOLOGY/ONCOLOGY | Facility: HOSPITAL | Age: 64
End: 2024-10-28

## 2024-10-28 LAB — INR: 1.8 (ref 0.8–1.2)

## 2024-10-28 RX ORDER — PANTOPRAZOLE SODIUM 40 MG/1
40 TABLET, DELAYED RELEASE ORAL DAILY
Qty: 90 TABLET | Refills: 0 | Status: SHIPPED | OUTPATIENT
Start: 2024-10-28

## 2024-10-29 ENCOUNTER — TELEPHONE (OUTPATIENT)
Facility: LOCATION | Age: 64
End: 2024-10-29

## 2024-10-29 ENCOUNTER — OFFICE VISIT (OUTPATIENT)
Dept: HEMATOLOGY/ONCOLOGY | Age: 64
End: 2024-10-29
Attending: INTERNAL MEDICINE
Payer: COMMERCIAL

## 2024-10-29 VITALS
SYSTOLIC BLOOD PRESSURE: 129 MMHG | TEMPERATURE: 97 F | DIASTOLIC BLOOD PRESSURE: 82 MMHG | BODY MASS INDEX: 35 KG/M2 | OXYGEN SATURATION: 98 % | WEIGHT: 254 LBS | RESPIRATION RATE: 20 BRPM | HEART RATE: 73 BPM

## 2024-10-29 DIAGNOSIS — Z86.718 HISTORY OF DVT (DEEP VEIN THROMBOSIS): ICD-10-CM

## 2024-10-29 DIAGNOSIS — Z79.01 CURRENT USE OF LONG TERM ANTICOAGULATION: ICD-10-CM

## 2024-10-29 DIAGNOSIS — Z71.89 ENCOUNTER FOR ANTICOAGULATION DISCUSSION AND COUNSELING: ICD-10-CM

## 2024-10-29 DIAGNOSIS — Z86.711 HISTORY OF PULMONARY EMBOLISM: Primary | ICD-10-CM

## 2024-10-29 PROCEDURE — 99214 OFFICE O/P EST MOD 30 MIN: CPT | Performed by: INTERNAL MEDICINE

## 2024-10-29 NOTE — PROGRESS NOTES
Cancer Center Progress Note    Patient Name: Mitchell Quintanilla   YOB: 1960   Medical Record Number: [id number]   CSN: [id number]   Attending Physician: Cori Smith M.D.   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 H/o previous PE around 1995- was on coumadin for 6 months. On indefinite anticoagulation.     Hypercoagulable work-up was negative.       History of Present Illness:  Doing fair he says. Continues on warfarin w/o problems or difficulty. No bleeding. C/o chronic LBP as well as joint pain from arthritis.  Denies SOB, chest or abdominal pain, change in bowel or urinary habits, headaches, dizziness or visual symptoms. No fevers or recent infections. No bleeding. Having colonoscopy 11/26/24.      Current Medications:    Current Outpatient Medications:     pantoprazole 40 MG Oral Tab EC, TAKE 1 TABLET DAILY, Disp: 90 tablet, Rfl: 0    gemfibrozil 600 MG Oral Tab, Take 1 tablet (600 mg total) by mouth 2 (two) times daily before meals., Disp: 180 tablet, Rfl: 1    warfarin 5 MG Oral Tab, TAKE 1 TO 1 AND 1/2 TABLETS BY MOUTH DAILY AS DIRECTED, Disp: 135 tablet, Rfl: 0    clopidogrel 75 MG Oral Tab, Take 1 tablet (75 mg total) by mouth daily., Disp: 30 tablet, Rfl: 5    rosuvastatin 10 MG Oral Tab, Take 1 tablet (10 mg total) by mouth nightly., Disp: 30 tablet, Rfl: 3    Multiple Vitamin (MULTI-VITAMIN DAILY) Oral Tab, Take 1 tablet by mouth daily., Disp: , Rfl:     Vitamin C 500 MG Oral Tab, Take 1 tablet (500 mg total) by mouth daily., Disp: , Rfl:     Acetaminophen (ARTHRITIS PAIN OR), Take by mouth., Disp: , Rfl:     Cholecalciferol (VITAMIN D3) 2000 units Oral Tab, Take by mouth., Disp: , Rfl:     Sodium Sulfate-Mag Sulfate-KCl (SUTAB) 0902-778-620 MG Oral Tab, Starting at 4PM the night before your procedure open one bottle  and take all 12 tablets with 16 ounces of water within 15-20 minutes. At 9PM open the second bottle and take all 12 tablets with another 16 ounces of water. (Patient not taking: Reported on 10/29/2024), Disp: 24 tablet, Rfl: 0    Allergies:  Allergies   Allergen Reactions    Darvocet [Propoxyphene N-Apap] NAUSEA ONLY     A500 TABS; abdominal pain          Review of Systems:  A 14-point ROS was done with pertinent positives and negative per the HPI    Vital Signs:  /82 (BP Location: Left arm, Patient Position: Sitting, Cuff Size: large)   Pulse 73   Temp 96.9 °F (36.1 °C) (Tympanic)   Resp 20   Wt 115.2 kg (254 lb)   SpO2 98%   BMI 35.43 kg/m²       Physical Examination:  General: Patient is alert and oriented x 3, not in acute distress.  HEENT: EOMs intact. PERRL. Oropharynx is clear.    Neck: No JVD. No palpable lymphadenopathy. Neck is supple.  Chest: Clear to auscultation.  Heart: Regular rate and rhythm.    Abdomen: Soft, non tender with good bowel sounds.  Extremities: Pedal pulses are present. No edema. Arthritic changes fingers  Neurological: Grossly intact.    Lymphatics: There is no palpable lymphadenopathy    Psych/Depression: Mood and affect appropriate    Laboratory:  Lab Results   Component Value Date    WBC 10.3 08/13/2024    RBC 4.01 (L) 08/13/2024    HGB 13.1 (L) 08/13/2024    HCT 39.4 08/13/2024    MCV 98.3 08/13/2024    MCH 32.7 08/13/2024    MCHC 33.2 08/13/2024    RDW 12.6 08/13/2024     08/13/2024    MPV 11.8 (H) 01/21/2012     Lab Results   Component Value Date     08/13/2024    K 4.0 08/13/2024     08/13/2024    CO2 27 08/13/2024    BUN 13 08/13/2024    CREATSERUM 0.94 08/13/2024    GLU 82 08/13/2024    CA 9.7 08/13/2024    ALKPHO 46 08/13/2024    ALT 15 08/13/2024    AST 17 08/13/2024    BILT 0.5 08/13/2024    ALB 4.5 08/13/2024    TP 7.6 08/13/2024     Lab Results   Component Value Date    INR 1.8 (A) 10/28/2024    INR 1.90 (A) 10/12/2024    INR 2.9 (A)  09/30/2024       Impression and Plan:  1. Recurrent VTE- On indefinite anticoagulation as long as bleeding risk remains low. He is comfortable on warfarin and have had previous discussions about newer options (DOACs) but opted to stay on warfarin. We again discussed this today and he will check insurance coverage for DOACs and let me should he decide to switch.  Continue home testing. Most recent INR therapeutic.     3. Colonoscopy- scheduled for 11/26/24. Sent clearance letter (10/9/24) with anticoagulation instructions:     - he will hold warfarin 11/21/24 and resume pm of 11/26/24 if cleared by Dr. Goel to do so. Cleared to proceed with colonoscopy with anticoagulation recommendations as outlined      2. Arthritis, LBP- asked whether he can take Celebrex on warfarin in the past. I told him this was acceptable as has minimal inhibitory effect on platelet function and therefore bleeding risk is reduced.     Labs reviewed    RTC 1 year.       [facility]    Addendum:  Informed us that cost of DOACs prohibitive and will stay on warfarin.     [facility]

## 2024-10-29 NOTE — PROGRESS NOTES
Here for annual MD fu visit   Continues on coumadin as prescribed  INR checked on Saturday and pt was dosed  No active bleeding  No bruising  No CP, no SOB  Education Record     Learner:  Patient     Disease / Diagnosis:      Barriers / Limitations:  none                Comments:     Method:  Discussion                Comments:     General Topics:  Plan of care reviewed                Comments:     Outcome:  Shows understanding                Comments:

## 2024-10-29 NOTE — TELEPHONE ENCOUNTER
Spoke with patient and his insurance will be active 11/01. His insurance is [facility] Medicare Supplement. ID # [id number]

## 2024-10-30 ENCOUNTER — TELEPHONE (OUTPATIENT)
Dept: HEMATOLOGY/ONCOLOGY | Facility: HOSPITAL | Age: 64
End: 2024-10-30

## 2024-10-30 NOTE — TELEPHONE ENCOUNTER
Mitchell [phone] would like to speak with someone about my Warfarin medication.  Will this be changing  or remain the same?  Thanks Toma

## 2024-10-31 DIAGNOSIS — I26.99 OTHER PULMONARY EMBOLISM WITHOUT ACUTE COR PULMONALE (HCC): ICD-10-CM

## 2024-10-31 RX ORDER — WARFARIN SODIUM 5 MG/1
TABLET ORAL
Qty: 135 TABLET | Refills: 0 | Status: SHIPPED | OUTPATIENT
Start: 2024-10-31 | End: 2025-01-17

## 2024-11-01 ENCOUNTER — TELEPHONE (OUTPATIENT)
Dept: HEMATOLOGY/ONCOLOGY | Facility: HOSPITAL | Age: 64
End: 2024-11-01

## 2024-11-01 NOTE — TELEPHONE ENCOUNTER
Mitchell [phone] I would like to know if the  medication Jantoven the same as warfarin ?  Thanks Toma

## 2024-11-06 ENCOUNTER — TELEPHONE (OUTPATIENT)
Facility: LOCATION | Age: 64
End: 2024-11-06

## 2024-11-06 ENCOUNTER — TELEPHONE (OUTPATIENT)
Dept: HEMATOLOGY/ONCOLOGY | Age: 64
End: 2024-11-06

## 2024-11-06 NOTE — TELEPHONE ENCOUNTER
Mitchell [phone]  My colon test for 10/26/24 has been canceled will continue to take my Warfarin 5mg. Thanks Toma

## 2024-11-06 NOTE — TELEPHONE ENCOUNTER
Patient called to cancel scope due to cardiologist not wanting him to stop his blood thinner now because he just started taking it.

## 2024-11-07 ENCOUNTER — ANTI-COAG VISIT (OUTPATIENT)
Dept: HEMATOLOGY/ONCOLOGY | Facility: HOSPITAL | Age: 64
End: 2024-11-07

## 2024-11-07 LAB — INR: 2.7 (ref 0.8–1.2)

## 2024-11-07 NOTE — TELEPHONE ENCOUNTER
Received fax from patients cardiologist. Patient is not cleared for procedure at this time. Will re evaluate in March. Fax sent for scanning.

## 2024-11-14 ENCOUNTER — ANTI-COAG VISIT (OUTPATIENT)
Dept: HEMATOLOGY/ONCOLOGY | Facility: HOSPITAL | Age: 64
End: 2024-11-14

## 2024-11-14 LAB — INR: 3.6 (ref 0.8–1.2)

## 2024-11-21 ENCOUNTER — ANTI-COAG VISIT (OUTPATIENT)
Dept: HEMATOLOGY/ONCOLOGY | Facility: HOSPITAL | Age: 64
End: 2024-11-21

## 2024-11-21 LAB — INR: 3.5 (ref 0.8–1.2)

## 2024-11-28 LAB — INR: 2.2 (ref 2–3)

## 2024-11-29 ENCOUNTER — ANTI-COAG VISIT (OUTPATIENT)
Dept: HEMATOLOGY/ONCOLOGY | Facility: HOSPITAL | Age: 64
End: 2024-11-29

## 2024-12-05 ENCOUNTER — ANTI-COAG VISIT (OUTPATIENT)
Dept: HEMATOLOGY/ONCOLOGY | Facility: HOSPITAL | Age: 64
End: 2024-12-05

## 2024-12-05 LAB — INR: 2.3 (ref 0.8–1.2)

## 2024-12-09 ENCOUNTER — TELEPHONE (OUTPATIENT)
Dept: FAMILY MEDICINE CLINIC | Facility: CLINIC | Age: 64
End: 2024-12-09

## 2024-12-09 NOTE — TELEPHONE ENCOUNTER
Disp Refills Start End    gemfibrozil 600 MG Oral Tab 180 tablet 1 9/20/2024 --    Sig - Route: Take 1 tablet (600 mg total) by mouth 2 (two) times daily before meals. - Oral    Sent to pharmacy as: Gemfibrozil 600 MG Oral Tablet (Lopid)    E-Prescribing Status: Receipt confirmed by pharmacy (9/20/2024 10:37 AM CDT)      Refill is not due at this time    Refill is not due until March 2025

## 2024-12-09 NOTE — TELEPHONE ENCOUNTER
[facility] requesting Medication Refill for:  gemfibrozil 600 MG Oral Tab   Qty: 90 days     LOV: 9/24/2024   Last Refill date: 9/20/2024

## 2024-12-16 RX ORDER — GEMFIBROZIL 600 MG/1
600 TABLET, FILM COATED ORAL
Qty: 180 TABLET | Refills: 0 | Status: SHIPPED | OUTPATIENT
Start: 2024-12-16 | End: 2025-03-05

## 2024-12-16 NOTE — TELEPHONE ENCOUNTER
Mitchell Quintanilla requesting Medication Refill for:      gemfibrozil 600 MG Oral Tab [[id number]]      [facility] [place] - [facility] AT [address] [facility], [phone], [phone]   [facility] [place] [address]   Phone: [phone] Fax: [phone]   Hours: Not open 24 hours     LOV: 9/24/2024   Last Refill date: 09/20/2024  Next Scheduled appointment:

## 2024-12-19 ENCOUNTER — TELEPHONE (OUTPATIENT)
Dept: FAMILY MEDICINE CLINIC | Facility: CLINIC | Age: 64
End: 2024-12-19

## 2024-12-19 ENCOUNTER — ANTI-COAG VISIT (OUTPATIENT)
Age: 64
End: 2024-12-19

## 2024-12-19 LAB — INR: 2.1 (ref 0.8–1.2)

## 2024-12-19 NOTE — TELEPHONE ENCOUNTER
Shaneka from [facility] pharmacy called for clarification on gemfibrozil 600 MG Oral Tab [[id number]]     Drug interaction with pt's warfarin, pharmacy would like a call back to clarify if pt soul be on both medications.     Please advise.     Ph: [phone] option 2 Reference# [id number]

## 2024-12-23 NOTE — TELEPHONE ENCOUNTER
Patient called to state that [facility] still has not released Gemfibrozil, I called [phone] and spoke to pharmacist Gisel, gave verbal consent over the phone, ok to dispense.  Gisel verbalizes understanding and will send through mail today.  Called patient back to update him. Patient verbalizes information.

## 2025-01-06 ENCOUNTER — ANTI-COAG VISIT (OUTPATIENT)
Age: 65
End: 2025-01-06

## 2025-01-06 LAB — INR: 1.9 (ref 0.8–1.2)

## 2025-01-17 DIAGNOSIS — I26.99 OTHER PULMONARY EMBOLISM WITHOUT ACUTE COR PULMONALE (HCC): ICD-10-CM

## 2025-01-17 RX ORDER — WARFARIN SODIUM 5 MG/1
TABLET ORAL
Qty: 135 TABLET | Refills: 2 | Status: SHIPPED | OUTPATIENT
Start: 2025-01-17

## 2025-01-20 ENCOUNTER — ANTI-COAG VISIT (OUTPATIENT)
Age: 65
End: 2025-01-20

## 2025-01-20 LAB
INR: 1.9 (ref 0.8–1.2)
INR: 2.3 (ref 0.8–1.2)

## 2025-02-03 ENCOUNTER — ANTI-COAG VISIT (OUTPATIENT)
Age: 65
End: 2025-02-03

## 2025-02-03 LAB — INR: 2 (ref 0.8–1.2)

## 2025-02-06 DIAGNOSIS — K21.00 REFLUX ESOPHAGITIS: ICD-10-CM

## 2025-02-06 RX ORDER — PANTOPRAZOLE SODIUM 40 MG/1
40 TABLET, DELAYED RELEASE ORAL DAILY
Qty: 90 TABLET | Refills: 0 | Status: SHIPPED | OUTPATIENT
Start: 2025-02-06

## 2025-02-06 NOTE — TELEPHONE ENCOUNTER
Mitchell Quintanilla requesting Medication Refill for:      pantoprazole 40 MG Oral       Preferred Pharmacy: [facility]     LOV: 9/24/2024

## 2025-02-17 ENCOUNTER — ANTI-COAG VISIT (OUTPATIENT)
Age: 65
End: 2025-02-17

## 2025-02-17 LAB — INR: 2.2 (ref 0.8–1.2)

## 2025-03-03 ENCOUNTER — ANTI-COAG VISIT (OUTPATIENT)
Age: 65
End: 2025-03-03

## 2025-03-03 LAB — INR: 1.9 (ref 0.8–1.2)

## 2025-03-05 RX ORDER — GEMFIBROZIL 600 MG/1
600 TABLET, FILM COATED ORAL
Qty: 180 TABLET | Refills: 0 | Status: SHIPPED | OUTPATIENT
Start: 2025-03-05

## 2025-03-12 PROBLEM — K21.9 GASTRIC REFLUX: Status: ACTIVE | Noted: 2024-05-14

## 2025-03-12 PROBLEM — I26.99 PULMONARY THROMBOEMBOLISM (HCC): Status: ACTIVE | Noted: 2024-05-30

## 2025-03-12 PROBLEM — M19.90 ARTHRITIS: Status: ACTIVE | Noted: 2024-05-14

## 2025-03-12 PROBLEM — M25.562 CHRONIC PAIN OF LEFT KNEE: Status: ACTIVE | Noted: 2023-02-11

## 2025-03-12 PROBLEM — M17.12 PRIMARY OSTEOARTHRITIS OF LEFT KNEE: Status: ACTIVE | Noted: 2023-02-11

## 2025-03-12 PROBLEM — G89.29 CHRONIC PAIN OF LEFT KNEE: Status: ACTIVE | Noted: 2023-02-11

## 2025-03-12 PROBLEM — I25.10 ARTERIOSCLEROSIS OF CORONARY ARTERY: Status: ACTIVE | Noted: 2024-05-08

## 2025-03-12 PROBLEM — E66.9 OBESITY (BMI 30-39.9): Status: ACTIVE | Noted: 2024-08-01

## 2025-03-12 PROBLEM — I82.409 DVT (DEEP VENOUS THROMBOSIS) (HCC): Status: ACTIVE | Noted: 2024-05-14

## 2025-03-12 PROBLEM — J40 BRONCHITIS: Status: ACTIVE | Noted: 2024-05-14

## 2025-03-17 ENCOUNTER — ANTI-COAG VISIT (OUTPATIENT)
Age: 65
End: 2025-03-17

## 2025-03-17 LAB — INR: 1.7 (ref 0.8–1.2)

## 2025-03-26 ENCOUNTER — ANTI-COAG VISIT (OUTPATIENT)
Age: 65
End: 2025-03-26

## 2025-03-26 LAB — INR: 3.3 (ref 0.8–1.2)

## 2025-04-02 ENCOUNTER — ANTI-COAG VISIT (OUTPATIENT)
Age: 65
End: 2025-04-02

## 2025-04-02 LAB — INR: 2.4 (ref 0.8–1.2)

## 2025-04-16 ENCOUNTER — ANTI-COAG VISIT (OUTPATIENT)
Age: 65
End: 2025-04-16

## 2025-04-16 LAB — INR: 1.9 (ref 0.8–1.2)

## 2025-04-19 DIAGNOSIS — K21.00 REFLUX ESOPHAGITIS: ICD-10-CM

## 2025-04-23 NOTE — TELEPHONE ENCOUNTER
Dr. Hanna -     Please advise if pantoprazole should be managed by Gastroenterology.    Patient saw LESLI Henning 3/12/25 (Gastroenterology)

## 2025-04-24 RX ORDER — PANTOPRAZOLE SODIUM 40 MG/1
40 TABLET, DELAYED RELEASE ORAL DAILY
Qty: 90 TABLET | Refills: 3 | Status: SHIPPED | OUTPATIENT
Start: 2025-04-24

## 2025-05-02 ENCOUNTER — ANTI-COAG VISIT (OUTPATIENT)
Age: 65
End: 2025-05-02

## 2025-05-02 LAB — INR: 2.7 (ref 0.8–1.2)

## 2025-05-13 PROBLEM — D12.5 BENIGN NEOPLASM OF SIGMOID COLON: Status: ACTIVE | Noted: 2025-05-13

## 2025-05-13 PROBLEM — Z80.0 FAMILY HISTORY OF COLON CANCER: Status: ACTIVE | Noted: 2018-10-05

## 2025-05-13 PROBLEM — D12.3 BENIGN NEOPLASM OF TRANSVERSE COLON: Status: ACTIVE | Noted: 2025-05-13

## 2025-05-13 PROBLEM — D12.2 BENIGN NEOPLASM OF ASCENDING COLON: Status: ACTIVE | Noted: 2025-05-13

## 2025-05-13 PROBLEM — Z12.11 SPECIAL SCREENING FOR MALIGNANT NEOPLASMS, COLON: Status: ACTIVE | Noted: 2018-10-05

## 2025-05-16 ENCOUNTER — ANTI-COAG VISIT (OUTPATIENT)
Age: 65
End: 2025-05-16

## 2025-05-16 ENCOUNTER — TELEPHONE (OUTPATIENT)
Age: 65
End: 2025-05-16

## 2025-05-16 LAB — INR: 1 (ref 0.8–1.2)

## 2025-05-19 RX ORDER — GEMFIBROZIL 600 MG/1
600 TABLET, FILM COATED ORAL
Qty: 180 TABLET | Refills: 3 | Status: SHIPPED | OUTPATIENT
Start: 2025-05-19

## 2025-05-19 NOTE — TELEPHONE ENCOUNTER
Refill passed [facility] protocol.     Please review due to:   Very High Drug-Drug: gemfibrozil and rosuvastatin The risk of myopathy and rhabdomyolysis may be increased by   co-administration of statin therapy (eg, Statins) and gemfibrozil. Specifically, co-administration of   lovastatin or simvastatin with gemfibrozil should be avoided or is contraindicated in official package labeling. Reduced maximum doses of statins (other than lovastatin or simvastatin) may be recommended for   patients receiving gemfibrozil in official package labeling for the other statin products.  Very High Drug-Drug: warfarin and gemfibrozil Fibric Acid Derivatives (eg,Fibric Acid Derivatives) may enhance the  anticoagulant effect of Vitamin K Antagonists (eg, Vitamin K Antagonists).

## 2025-05-21 ENCOUNTER — ANTI-COAG VISIT (OUTPATIENT)
Age: 65
End: 2025-05-21

## 2025-05-21 ENCOUNTER — TELEPHONE (OUTPATIENT)
Facility: LOCATION | Age: 65
End: 2025-05-21

## 2025-05-21 LAB — INR: 1.6 (ref 0.8–1.2)

## 2025-05-30 ENCOUNTER — ANTI-COAG VISIT (OUTPATIENT)
Age: 65
End: 2025-05-30

## 2025-05-30 ENCOUNTER — TELEPHONE (OUTPATIENT)
Facility: LOCATION | Age: 65
End: 2025-05-30

## 2025-05-30 LAB — INR: 2.3 (ref 0.8–1.2)

## 2025-06-06 ENCOUNTER — ANTI-COAG VISIT (OUTPATIENT)
Age: 65
End: 2025-06-06

## 2025-06-06 ENCOUNTER — APPOINTMENT (OUTPATIENT)
Dept: GENERAL RADIOLOGY | Age: 65
End: 2025-06-06
Payer: COMMERCIAL

## 2025-06-06 ENCOUNTER — HOSPITAL ENCOUNTER (EMERGENCY)
Age: 65
Discharge: HOME | End: 2025-06-06
Payer: COMMERCIAL

## 2025-06-06 ENCOUNTER — OFFICE VISIT (OUTPATIENT)
Dept: FAMILY MEDICINE CLINIC | Facility: CLINIC | Age: 65
End: 2025-06-06
Payer: COMMERCIAL

## 2025-06-06 VITALS
SYSTOLIC BLOOD PRESSURE: 132 MMHG | OXYGEN SATURATION: 96 % | HEART RATE: 72 BPM | RESPIRATION RATE: 20 BRPM | TEMPERATURE: 98 F | DIASTOLIC BLOOD PRESSURE: 80 MMHG

## 2025-06-06 VITALS
BODY MASS INDEX: 38 KG/M2 | TEMPERATURE: 97 F | HEART RATE: 72 BPM | SYSTOLIC BLOOD PRESSURE: 137 MMHG | OXYGEN SATURATION: 99 % | RESPIRATION RATE: 16 BRPM | WEIGHT: 270.06 LBS | DIASTOLIC BLOOD PRESSURE: 83 MMHG

## 2025-06-06 DIAGNOSIS — M79.672 LEFT FOOT PAIN: Primary | ICD-10-CM

## 2025-06-06 DIAGNOSIS — M25.572 ACUTE LEFT ANKLE PAIN: Primary | ICD-10-CM

## 2025-06-06 LAB — INR: 3.2 (ref 0.8–1.2)

## 2025-06-06 PROCEDURE — 99283 EMERGENCY DEPT VISIT LOW MDM: CPT

## 2025-06-06 PROCEDURE — 99215 OFFICE O/P EST HI 40 MIN: CPT | Performed by: NURSE PRACTITIONER

## 2025-06-06 PROCEDURE — 99284 EMERGENCY DEPT VISIT MOD MDM: CPT

## 2025-06-06 PROCEDURE — 73610 X-RAY EXAM OF ANKLE: CPT

## 2025-06-06 PROCEDURE — 73630 X-RAY EXAM OF FOOT: CPT

## 2025-06-06 NOTE — ED PROVIDER NOTES
Patient Seen in: [facility] In [place]        History  Chief Complaint   Patient presents with    Foot Injury     Stated Complaint: Pt fell today injuring left foot.    Subjective:   HPI         A pleasant 64 year old male presents to the emergency room with left ankle pain x1 day. Patient states he rolled his ankle this morning will working in the yard. Patient states he was working underneath a tree when he accidentally stepped into a hole in the ground. Patient states he felt immediate pain which caused him to fall to the ground. Patient describes the pain as dull and rates it a 4/10 when sitting and 10/10 when walking or putting pressure on his ankle. Patient denies hitting his head, LOC, injury to another joint, SOB, dizziness or chest pain. Patient states he has not tried anything for pain relief such as ice, ibuprofen or tylenol. Patient denies injury to his left ankle in the past. Patient states he did not see a visible deformity or swelling to his left ankle after the injury.     Objective:     Past Medical History:    Arthritis    Back pain    Bronchitis, not specified as acute or chronic    Coronary atherosclerosis    Cough    Current use of long term anticoagulation    DVT (deep venous thrombosis) (HCC)    Gastric reflux    Hernia of unspecified site of abdominal cavity without mention of obstruction or gangrene    High cholesterol    Hyperlipidemia    Obesity    Pain in joints    PE (pulmonary embolism)    Screening for malignant neoplasm of colon    Stented coronary artery    Wears glasses    Glass              Past Surgical History:   Procedure Laterality Date    Back surgery      Colonoscopy      Knee arthroscp [facility]  02/2009    Tonsillectomy  1963    Vasectomy  1994                Social History     Socioeconomic History    Marital status:    Tobacco Use    Smoking status: Never    Smokeless tobacco: Never   Vaping Use    Vaping status: Never Used   Substance and Sexual  Activity    Alcohol use: No     Comment: NEVER    Drug use: No   Other Topics Concern    Caffeine Concern No    Stress Concern No    Weight Concern Yes    Special Diet No    Exercise No    Seat Belt Yes     Social Drivers of Health     Food Insecurity: No Food Insecurity (5/7/2024)    Food Insecurity     Food Insecurity: Never true   Transportation Needs: No Transportation Needs (5/10/2024)    Transportation Needs     Lack of Transportation: No   Housing Stability: Low Risk  (5/7/2024)    Housing Stability     Housing Instability: No                                Physical Exam    ED Triage Vitals [06/06/25 1413]   /83   Pulse 72   Resp 16   Temp 97.2 °F (36.2 °C)   Temp src Temporal   SpO2 99 %   O2 Device None (Room air)       Current Vitals:   Vital Signs  BP: 137/83  Pulse: 72  Resp: 16  Temp: 97.2 °F (36.2 °C)  Temp src: Temporal    Oxygen Therapy  SpO2: 99 %  O2 Device: None (Room air)            Physical Exam  Vitals and nursing note reviewed.   Constitutional:       General: He is not in acute distress.     Appearance: Normal appearance. He is well-developed.   HENT:      Head: Normocephalic and atraumatic.      Right Ear: External ear normal.      Left Ear: External ear normal.   Eyes:      Conjunctiva/sclera: Conjunctivae normal.   Cardiovascular:      Rate and Rhythm: Normal rate.   Pulmonary:      Effort: Pulmonary effort is normal.   Musculoskeletal:         General: Tenderness (lateral malleolus) present. No deformity.      Cervical back: Normal range of motion and neck supple.      Comments: Tenderness to palpation on the lateral aspect of the left lateral malleolus down to the lateral midfoot. No necrosis, deformity, abrasion or pus noted. Range of motion intact of the left ankle but pain upon plantar flexion, medial and lateral deviation. Sensation intact bilaterally. 2+ posterior tibialis pulses present bilaterally.  Strength 5/5 for bilateral ankles.    Skin:     General: Skin is warm and  dry.      Capillary Refill: Capillary refill takes less than 2 seconds.   Neurological:      Mental Status: He is alert.   Psychiatric:         Mood and Affect: Mood normal.                 ED Course  Labs Reviewed - No data to display       XR ANKLE (MIN 3 VIEWS), LEFT (CPT=73610)  Result Date: 6/6/2025  PROCEDURE:  XR ANKLE (MIN 3 VIEWS), LEFT (CPT=73610)  TECHNIQUE:  Three views were obtained.  COMPARISON:  None.  INDICATIONS:  Pt fell today injuring left foot.  PATIENT STATED HISTORY: (As transcribed by Technologist)  Patient fell today injuring his left foot and ankle. He states pain with motion and walking. The pain is on the lateral side of his foot and ankle.    FINDINGS:  No acute fractures or osseous lesions are identified.  Ankle mortise is intact.  No significant joint effusion.  Vascular calcifications.  Calcaneal enthesophytes.  Well corticated ossific density adjacent to the medial malleolus likely represents accessory ossification or old injury.             CONCLUSION:  No acute osseous findings.   LOCATION:  [facility]   Dictated by (CST): Jasmyne Lew MD on 6/06/2025 at 2:46 PM     Finalized by (CST): Jasmyne Lew MD on 6/06/2025 at 2:47 PM       XR FOOT, COMPLETE (MIN 3 VIEWS), LEFT (CPT=73630)  Result Date: 6/6/2025  PROCEDURE:  XR FOOT, COMPLETE (MIN 3 VIEWS), LEFT (CPT=73630)  TECHNIQUE:  AP, oblique, and lateral views were obtained.  COMPARISON:  None.  INDICATIONS:  Pt fell today injuring left foot.  PATIENT STATED HISTORY: (As transcribed by Technologist)  Patient fell today injuring his left foot and ankle. He states pain with motion and walking. The pain is on the lateral side of his foot and ankle. He has a history of plantar fasciitis.    FINDINGS:  No acute fractures or osseous lesions are identified.  Phalangeal relationships are within normal limits no significant joint effusion.  Calcaneal enthesophytes.  Is is is mild degenerative changes.            CONCLUSION:  No acute  fractures.  Mild degenerative changes.   LOCATION:  [facility]   Dictated by (CST): Jasmyne Lew MD on 6/06/2025 at 2:45 PM     Finalized by (CST): Jasmyne Lew MD on 6/06/2025 at 2:46 PM                         MDM     64 year old male presents to the ER for left ankle pain after twisting injury today.    Differential diagnosis reflecting the complexity of care include: Fracture, dislocation, contusion    I have independently visualized the radiology images, my focus/limited interpretation: Patient no fracture noted on ankle x-ray and foot x-ray  Defer to radiologist for other/incidental findings         X-rays negative for acute fracture.  Likely ligament injury.  Patient provided with Ace wrap.  Offered crutches, but patient declined as he has them at home.  Patient was informed of all results.  Recommended rest, ice and elevate at home.  Ibuprofen as needed for pain.  Return precautions given.  All questions answered.      Medical Decision Making      Disposition and Plan     Clinical Impression:  1. Acute left ankle pain         Disposition:  Discharge  6/6/2025  3:20 pm    Follow-up:  Willie Hanna MD  [address] [address]  [phone]    Schedule an appointment as soon as possible for a visit in 1 week(s)            Medications Prescribed:  Discharge Medication List as of 6/6/2025  3:22 PM                Supplementary Documentation:

## 2025-06-06 NOTE — DISCHARGE INSTRUCTIONS
Take ibuprofen as needed for pain  Rest, ice, elevate your ankle   Do not do any activity that will aggravate your pain  Follow up with PCP   Return to the ER if there is any worsening symptoms

## 2025-06-06 NOTE — PROGRESS NOTES
CHIEF COMPLAINT:     Chief Complaint   Patient presents with    Foot Injury     Pt fell in a hole in his yard today 06/06/2025, pt hurt left foot can't bare weight, pain is within the foot and ankle.       HPI:     Mitchell Quintanilla is a 64 year old male who presents with concerns of fell in backyard @ 2 hours ago, he was doing yard work and stepped in a hole. Pt c/o pain to left foot and ankle, at rest 6/10, with weight bearing 10/10.   Symptoms have been persistent since onset.  Treatments: none.  Associated symptoms include: none.  Denies fever, streaking of wound, or other signs of systemic illness.       Current Medications[1]   Past Medical History[2]   Social History:  Short Social Hx on File[3]     REVIEW OF SYSTEMS:   GENERAL: feels well otherwise, no fever, no chills.  SKIN: no rash.  MUSC/SKEL: see HPI  NEURO: no abnormal sensation, no tingling of the skin or numbness.    EXAM:   /80   Pulse 72   Temp 98.2 °F (36.8 °C) (Oral)   Resp 20   SpO2 96%   GENERAL: well developed, well nourished,in no apparent distress  HEAD: atraumatic, normocephalic  EYES: conjunctiva clear, EOM intact  NOSE: Normal external nose.  No rhinorrhea.  EXTREMITIES: no cyanosis, clubbing or edema.  Cap refill brisk- less than 2 seconds. Left  to palpation over proximal left 5th metatarsal, some tenderness to left posterior TFL, no swelling around lateral malleolus  PSYCH: pleasant mood and affect  NEURO: no focal deficits.      ASSESSMENT AND PLAN:     ASSESSMENT:  Encounter Diagnosis   Name Primary?    Left foot pain Yes       PLAN:   Pt will proceed to [facility] to further eval and treat  Pt is stable upon discharge from [facility]         [1]   Current Outpatient Medications   Medication Sig Dispense Refill    gemfibrozil 600 MG Oral Tab Take 1 tablet (600 mg total) by mouth 2 (two) times daily before meals. 180 tablet 3    pantoprazole 40 MG Oral Tab EC Take 1 tablet (40 mg total) by mouth daily. 90 tablet 3     warfarin (JANTOVEN) 5 MG Oral Tab TAKE 1 TO 1 AND 1/2 TABLETSDAILY AS DIRECTED 135 tablet 2    clopidogrel 75 MG Oral Tab Take 1 tablet (75 mg total) by mouth daily. 30 tablet 5    rosuvastatin 10 MG Oral Tab Take 1 tablet (10 mg total) by mouth nightly. 30 tablet 3    Multiple Vitamin (MULTI-VITAMIN DAILY) Oral Tab Take 1 tablet by mouth daily.      Vitamin C 500 MG Oral Tab Take 1 tablet (500 mg total) by mouth daily.      Acetaminophen (ARTHRITIS PAIN OR) Take by mouth.      Cholecalciferol (VITAMIN D3) 2000 units Oral Tab Take by mouth.     [2]   Past Medical History:   Arthritis    Back pain    Bronchitis, not specified as acute or chronic    Coronary atherosclerosis    Cough    Current use of long term anticoagulation    DVT (deep venous thrombosis) (HCC)    Gastric reflux    Hernia of unspecified site of abdominal cavity without mention of obstruction or gangrene    High cholesterol    Hyperlipidemia    Obesity    Pain in joints    PE (pulmonary embolism)    Screening for malignant neoplasm of colon    Stented coronary artery    Wears glasses    Glass   [3]   Social History  Socioeconomic History    Marital status:    Tobacco Use    Smoking status: Never    Smokeless tobacco: Never   Vaping Use    Vaping status: Never Used   Substance and Sexual Activity    Alcohol use: No     Comment: NEVER    Drug use: No   Other Topics Concern    Caffeine Concern No    Stress Concern No    Weight Concern Yes    Special Diet No    Exercise No    Seat Belt Yes     Social Drivers of Health     Food Insecurity: No Food Insecurity (5/7/2024)    Food Insecurity     Food Insecurity: Never true   Transportation Needs: No Transportation Needs (5/10/2024)    Transportation Needs     Lack of Transportation: No   Housing Stability: Low Risk  (5/7/2024)    Housing Stability     Housing Instability: No

## 2025-06-13 ENCOUNTER — ANTI-COAG VISIT (OUTPATIENT)
Age: 65
End: 2025-06-13

## 2025-06-13 LAB — INR: 2.3 (ref 0.8–1.2)

## 2025-06-27 ENCOUNTER — ANTI-COAG VISIT (OUTPATIENT)
Age: 65
End: 2025-06-27

## 2025-06-27 LAB — INR: 2.7 (ref 0.8–1.2)

## 2025-07-11 ENCOUNTER — ANTI-COAG VISIT (OUTPATIENT)
Age: 65
End: 2025-07-11

## 2025-07-11 LAB — INR: 2.1 (ref 0.8–1.2)

## 2025-07-25 ENCOUNTER — ANTI-COAG VISIT (OUTPATIENT)
Facility: LOCATION | Age: 65
End: 2025-07-25

## 2025-07-25 LAB — INR: 2.3 (ref 0.8–1.2)

## 2025-08-08 ENCOUNTER — ANTI-COAG VISIT (OUTPATIENT)
Facility: LOCATION | Age: 65
End: 2025-08-08

## 2025-08-08 LAB — INR: 2.5 (ref 0.8–1.2)

## 2025-08-22 ENCOUNTER — ANTI-COAG VISIT (OUTPATIENT)
Facility: LOCATION | Age: 65
End: 2025-08-22

## 2025-08-22 LAB — INR: 1.9 (ref 0.8–1.2)

## (undated) DIAGNOSIS — K21.00 REFLUX ESOPHAGITIS: ICD-10-CM

## (undated) DEVICE — GLOVE SURG SENSICARE SZ 7-1/2

## (undated) DEVICE — PAD GROUND ELECTRODE 1.5M

## (undated) DEVICE — REMOVER DURAPREP 3M

## (undated) DEVICE — PAIN TRAY: Brand: MEDLINE INDUSTRIES, INC.

## (undated) DEVICE — SHEET, T, LAPAROTOMY, STERILE: Brand: MEDLINE

## (undated) DEVICE — MARKER PRE-SURGICAL MINI DISP

## (undated) DEVICE — AVANOS* TUOHY EPIDURAL NEEDLE: Brand: AVANOS

## (undated) DEVICE — GLOVE SURG SENSICARE SZ 7

## (undated) DEVICE — DRAPE C-ARM UNIVERSAL

## (undated) DEVICE — GLOVE SURG SENSICARE SZ 6-1/2

## (undated) DEVICE — BANDAID COVERLET 1X3

## (undated) DEVICE — MARKER SKIN 2 TIP

## (undated) DEVICE — Device

## (undated) DEVICE — RF CANNULA, CVD: Brand: VENOM

## (undated) NOTE — LETTER
10/28/24    Patient: Mitchell Quintanilla  : 10/16/1960 Visit date: 10/8/2024    Dear  Willie Hanna MD    Thank you for referring Mitchell Quintanilla to my practice.  Please find my assessment and plan below.        I saw Mitchell in the office he has a history of colon polyps.  He is scheduled for screening colonoscopy.  Thank you Leno  Sincerely,       Sher Goel DO   CC: No Recipients

## (undated) NOTE — LETTER
Surgical Clearance Needed    Date: 9/14/2018                                                                       From: Albaro Adorno    Attn: Dr. Christopher Youngblood                                                                            [phone]

## (undated) NOTE — LETTER
08/20/17        [place] [address]      Dear Stew Vences,    [facility] records indicate that you have outstanding lab work and or testing that was ordered for you and has not yet been completed:          COMP METABOLIC PANEL      LI

## (undated) NOTE — Clinical Note
I had the pleasure of seeing Mr. Keanu Guillen in my office today. Please see my attached note.     Gary Rouse

## (undated) NOTE — LETTER
[facility] [place] [address]  [facility] [address]  PH: [phone]  FAX: [phone]      ANTICOAGULATION CLEARANCE REQUEST    Date: 5/14/2024    Attention:  Dr. Harris          Phone:  [phone]  Fax:  [phone]    Our mutual patient, Mitchell Quintanilla 10/16/1960 is scheduled for cardiac procedure.  Date of procedure: 5/24/24    Our records show this patient is on Warfarin (Coumadin).    Hold date:  5/21/24    Resume date:  5/24/24    Comments: resume night of procedure unless otherwise directed by cardiologist    Signature: Dr. Smith/Nadja WATTS RN     Date:  5/14/2024    Should you have any questions, please feel free to contact my office at [phone].

## (undated) NOTE — Clinical Note
TCM call completed. Pt declined an appt at this time as he is FU with Cardiology. Pt denies concerns. Thank you Dr. Hanna!

## (undated) NOTE — LETTER
[address]  [address]  Consent for Procedure/Sedation  Date: 05/08/24         Time: 1233    I hereby authorize Dr Burgos, my physician and his/her assistants (if applicable), which may include medical students, residents, and/or fellows, to perform the following surgical operation/ procedure and administer such anesthesia as may be determined necessary by my physician:  Operation/Procedure name (s)  Cardiac Catheterization, Left Ventricular Cineangiography, Bilateral Selective Coronary Angiography and/or Right Heart Catheterization; possible Percutaneous Transluminal Coronary Angioplasty, Coronary Atherectomy, Coronary Stent, Intracoronary Thrombolytic therapy, Antiplatelet therapy and/or Intravascular Ultrasound on Mitchell Quintanilla   2.   I recognize that during the surgical operation/procedure, unforeseen conditions may necessitate additional or different procedures than those listed above.  I, therefore, further authorize and request that the above-named surgeon, assistants, or designees perform such procedures as are, in their judgment, necessary and desirable.    3.   My surgeon/physician has discussed prior to my surgery the potential benefits, risks and side effects of this procedure; the likelihood of achieving goals; and potential problems that might occur during recuperation.  They also discussed reasonable alternatives to the procedure, including risks, benefits, and side effects related to the alternatives and risks related to not receiving this procedure.  I have had all my questions answered and I acknowledge that no guarantee has been made as to the result that may be obtained.    4.   Should the need arise during my operation/procedure, which includes change of level of care prior to discharge, I also consent to the administration of blood and/or blood products.  Further, I understand that despite careful testing and screening of blood or blood products by  collecting agencies, I may still be subject to ill effects as a result of receiving a blood transfusion and/or blood products.  The following are some, but not all, of the potential risks that can occur: fever and allergic reactions, hemolytic reactions, transmission of diseases such as Hepatitis, AIDS and Cytomegalovirus (CMV) and fluid overload.  In the event that I wish to have an autologous transfusion of my own blood, or a directed donor transfusion, I will discuss this with my physician.  Check only if Refusing Blood or Blood Products  I understand refusal of blood or blood products as deemed necessary by my physician may have serious consequences to my condition to include possible death. I hereby assume responsibility for my refusal and release the hospital, its personnel, and my physicians from any responsibility for the consequences of my refusal.          o  Refuse      5.   I authorize the use of any specimen, organs, tissues, body parts or foreign objects that may be removed from my body during the operation/procedure for diagnosis, research or teaching purposes and their subsequent disposal by hospital authorities.  I also authorize the release of specimen test results and/or written reports to my treating physician on the hospital medical staff or other referring or consulting physicians involved in my care, at the discretion of the Pathologist or my treating physician.    6.   I consent to the photographing or videotaping of the operations or procedures to be performed, including appropriate portions of my body for medical, scientific, or educational purposes, provided my identity is not revealed by the pictures or by descriptive texts accompanying them.  If the procedure has been photographed/videotaped, the surgeon will obtain the original picture, image, videotape or CD.  The hospital will not be responsible for storage, release or maintenance of the picture, image, tape or CD.    7.   I consent  to the presence of a  or observers in the operating room as deemed necessary by my physician or their designees.    8.   I recognize that in the event my procedure results in extended X-Ray/fluoroscopy time, I may develop a skin reaction.    9. If I have a Do Not Attempt Resuscitation (DNAR) order in place, that status will be suspended while in the operating room, procedural suite, and during the recovery period unless otherwise explicitly stated by me (or a person authorized to consent on my behalf). The surgeon or my attending physician will determine when the applicable recovery period ends for purposes of reinstating the DNAR order.  10. Patients having a sterilization procedure: I understand that if the procedure is successful the results will be permanent and it will therefore be impossible for me to inseminate, conceive, or bear children.  I also understand that the procedure is intended to result in sterility, although the result has not been guaranteed.   11. I acknowledge that my physician has explained sedation/analgesia administration to me including the risk and benefits I consent to the administration of sedation/analgesia as may be necessary or desirable in the judgment of my physician.    I CERTIFY THAT I HAVE READ AND FULLY UNDERSTAND THE ABOVE CONSENT TO OPERATION and/or OTHER PROCEDURE.      ____________________________________       _________________________________      ______________________________  Signature of Patient         Signature of Responsible Person        Printed Name of Responsible Person   ____________________________________      _________________________________      ______________________________       Signature of Witness          Relationship to Patient                       Date                                       Time  Patient Name: Mitchell KILEY Quintanilla  : 10/16/1960    Reviewed: 2024   Printed: May 8, 2024  Medical Record #: [id number] Page 1 of 1

## (undated) NOTE — LETTER
Date: 5/9/2024    Patient Name: Mitchell Quintanilla          To Whom it may concern:    This letter has been written at the patient's request. The above patient was seen at [facility] for treatment of a medical condition.    This patient should be excused from attending work/school from 5/7/2024 through 5/14/2024.    The patient may return to work/school on 5/14/2024 with the following limitations   Lift no more than 15 pounds       Sincerely,    SARY Epps

## (undated) NOTE — LETTER
06/14/18        [address]  PRE-PROCEDURE INSTRUCTIONS WITH IV SEDATION        Appointment Date: 7-10-1   7-24-18 8-7-18   Check-In Time: 1:15        9:00    9:00    ** TO AVOID CANCELLATION AND/OR RESCHEDULING: PLEASE CALL JACK PRE-PROCEDU Number of days you need to be off for the following medications:  ? Aggrenox 10 days   ? Agrylin (Anagrelide) 10 days   ? Enbrel (Etanercept) 24 hours   ? Fragmin (Dalteparin) 24 hours   ? Pletal (Cilostazol) 7 days  ? Effient (Prasugrel) 7 days  ?  Pradax your blood sugar. Contact your primary care physician if your blood sugar rises as you may require some medication adjustment.         It is normal to have increased pain at injection site for up to 3-5 days after procedure, you can        use heat or ice strong pressure and pinching  Will I be \"put out\" for the epidural injection? No, this procedure is done with a local anesthetic. Some patients choose to receive intravenous sedation that can make the procedure easier to tolerate.  This type of sedation Most patients do not receive more than three injections in a six-month period. This is because the effects of the medication injected frequently last for six months or more.  If three injections have not helped you very much, you may be a candidate for a di

## (undated) NOTE — IP AVS SNAPSHOT
[facility] [place] [address] [place], [address] ~ [phone]                Discharge Summary   2/14/2017    Meng Gonsalves           Admission Information        Provider Department    2/14/2017 Orly Escoto MD  [facility]      S 1. Resume your regular diet as tolerated. 2. Resume all previous prescribed medication. 3. We recommend that you relax and rest during the rest of your procedure day. 4. Please do not dance, run or go for long walks tonight.   5. Return to your follow up Recent Hematology Lab Results  (Last 3 results in the past 90 days)    WBC RBC Hemoglobin Hematocrit MCV MCH MCHC RDW Platelet MPV    (25/38/55)  5.5 (11/17/16)  4.54 (11/17/16)  14.9 (11/17/16)  43.5 (11/17/16)  95.8 (11/17/16)  32.8 (11/17/16)  34.3 -- ( Visit Information        Department Dept Phone    2/14/2017 11:02 AM  [facility]          We want to hear from you       We want to hear from you, please share your experience with us by returning the survey you will receive in the mail. Thank you!         Jane Ramachandran

## (undated) NOTE — LETTER
18        RE: Ariel Hamilton    : 10/16/1960    Dear Dr. Ashlyn Enamorado,    Your patient is being scheduled for a pain management procedure at [facility].    Procedure:  LUMBAR INTERLAMINAR EPIDURAL INJECTION WITH SEDATION   Date of Procedure:

## (undated) NOTE — LETTER
Sher Goel D.O.    Surgical Clearance Needed    Date: 10/8/2024                                                                       From: Shiloh Wilson: Dr. Burgos                                                                              Fax:     RE: Surgical Clearance               # of Pages: 1        Patient Name: Mitchell Quintanilla    Patient YOB: 1960    Consult For: Cardiac Clearance    Surgery: Colonoscopy    Date of Surgery: 11/26/2024 at [facility]        This facsimile transmission is provided for your internal use only. If the reader of this message is not the intended recipient, you are hereby notified that you have received this document in error, and that any review, dissemination, distribution, or copying of this message is strictly prohibited. If you have received this communication in error, please notify us immediately by telephone and return the original message to us by mail.

## (undated) NOTE — LETTER
Sher Goel D.O.    Surgical Clearance Needed    Date: 10/8/2024                                                                       From: Shiloh Wilson: Dr. Smith                                                                                Fax:     RE: Surgical Clearance               # of Pages: 1        Patient Name: Mitchell Quintanilla    Patient YOB: 1960    Consult For: Anti Coagulation Management    Surgery: Colonoscopy    Date of Surgery: 11/26/2024 at [facility]        This facsimile transmission is provided for your internal use only. If the reader of this message is not the intended recipient, you are hereby notified that you have received this document in error, and that any review, dissemination, distribution, or copying of this message is strictly prohibited. If you have received this communication in error, please notify us immediately by telephone and return the original message to us by mail.

## (undated) NOTE — LETTER
05/25/18        [address] [place] [id number]      Dear Mylene Grimes,    [facility] records indicate that you have outstanding lab work and or testing that was ordered for you and has not yet been completed:          Lipid Panel      [employer]

## (undated) NOTE — LETTER
Date: 6/12/2018    Patient Name: Courtney Flanagan      To Whom it may concern: This letter has been written at the patient's request. The above patient was seen at the [facility] for treatment of a medical condition.     This patient should b

## (undated) NOTE — IP AVS SNAPSHOT
[facility] [place] [address] [place], [facility] ~ [phone]                Discharge Summary   3/27/2017    Ariel Hamilton           Admission Information        Provider Department    3/27/2017 Sheila Coronado MD  Yisel Contreras / Terry Andrade 1. Resume your regular diet as tolerated. 2. Resume all previous prescribed medication. 3. We recommend that you relax and rest during the rest of your procedure day. 4. Please do not dance, run or go for long walks tonight.   5. Return to your follow up Additional Information       We are concerned for your overall well being:    - If you are a smoker or have smoked in the last 12 months, we encourage you to explore options for quitting.     - If you have concerns related to behavioral health issues or th

## (undated) NOTE — LETTER
[address]  [address]  Consent for Procedure/Sedation  Date: 04/08/2024         Time: 0946    I hereby authorize Dr. Burgos, my physician and his/her assistants (if applicable), which may include medical students, residents, and/or fellows, to perform the following surgical operation/ procedure and administer such anesthesia as may be determined necessary by my physician:  Operation/Procedure name (s)  Cardiac Catheterization, Left Ventricular Cineangiography, Bilateral Selective Coronary Angiography and/or Right Heart Catheterization; possible Percutaneous Transluminal Coronary Angioplasty, Coronary Atherectomy, Coronary Stent, Intracoronary Thrombolytic therapy, Antiplatelet therapy and/or Intravascular Ultrasound on Mitchell KILEY Quitnanilla   2.   I recognize that during the surgical operation/procedure, unforeseen conditions may necessitate additional or different procedures than those listed above.  I, therefore, further authorize and request that the above-named surgeon, assistants, or designees perform such procedures as are, in their judgment, necessary and desirable.    3.   My surgeon/physician has discussed prior to my surgery the potential benefits, risks and side effects of this procedure; the likelihood of achieving goals; and potential problems that might occur during recuperation.  They also discussed reasonable alternatives to the procedure, including risks, benefits, and side effects related to the alternatives and risks related to not receiving this procedure.  I have had all my questions answered and I acknowledge that no guarantee has been made as to the result that may be obtained.    4.   Should the need arise during my operation/procedure, which includes change of level of care prior to discharge, I also consent to the administration of blood and/or blood products.  Further, I understand that despite careful testing and screening of blood or blood products by  collecting agencies, I may still be subject to ill effects as a result of receiving a blood transfusion and/or blood products.  The following are some, but not all, of the potential risks that can occur: fever and allergic reactions, hemolytic reactions, transmission of diseases such as Hepatitis, AIDS and Cytomegalovirus (CMV) and fluid overload.  In the event that I wish to have an autologous transfusion of my own blood, or a directed donor transfusion, I will discuss this with my physician.  Check only if Refusing Blood or Blood Products  I understand refusal of blood or blood products as deemed necessary by my physician may have serious consequences to my condition to include possible death. I hereby assume responsibility for my refusal and release the hospital, its personnel, and my physicians from any responsibility for the consequences of my refusal.          o  Refuse      5.   I authorize the use of any specimen, organs, tissues, body parts or foreign objects that may be removed from my body during the operation/procedure for diagnosis, research or teaching purposes and their subsequent disposal by hospital authorities.  I also authorize the release of specimen test results and/or written reports to my treating physician on the hospital medical staff or other referring or consulting physicians involved in my care, at the discretion of the Pathologist or my treating physician.    6.   I consent to the photographing or videotaping of the operations or procedures to be performed, including appropriate portions of my body for medical, scientific, or educational purposes, provided my identity is not revealed by the pictures or by descriptive texts accompanying them.  If the procedure has been photographed/videotaped, the surgeon will obtain the original picture, image, videotape or CD.  The hospital will not be responsible for storage, release or maintenance of the picture, image, tape or CD.    7.   I consent  to the presence of a  or observers in the operating room as deemed necessary by my physician or their designees.    8.   I recognize that in the event my procedure results in extended X-Ray/fluoroscopy time, I may develop a skin reaction.    9. If I have a Do Not Attempt Resuscitation (DNAR) order in place, that status will be suspended while in the operating room, procedural suite, and during the recovery period unless otherwise explicitly stated by me (or a person authorized to consent on my behalf). The surgeon or my attending physician will determine when the applicable recovery period ends for purposes of reinstating the DNAR order.  10. Patients having a sterilization procedure: I understand that if the procedure is successful the results will be permanent and it will therefore be impossible for me to inseminate, conceive, or bear children.  I also understand that the procedure is intended to result in sterility, although the result has not been guaranteed.   11. I acknowledge that my physician has explained sedation/analgesia administration to me including the risk and benefits I consent to the administration of sedation/analgesia as may be necessary or desirable in the judgment of my physician.    I CERTIFY THAT I HAVE READ AND FULLY UNDERSTAND THE ABOVE CONSENT TO OPERATION and/or OTHER PROCEDURE.      ____________________________________       _________________________________      ______________________________  Signature of Patient         Signature of Responsible Person        Printed Name of Responsible Person   ____________________________________      _________________________________      ______________________________       Signature of Witness          Relationship to Patient                       Date                                       Time  Patient Name: Mitchell KILEY Quintanilla  : 10/16/1960    Reviewed: 2024   Printed: May 8, 2024  Medical Record #: [id number] Page 1 of 1

## (undated) NOTE — LETTER
Date & Time: 5/9/2024, 2:18 PM  Patient: Mitchell Quintanilla  Encounter Provider(s):    Andreas Acosta MD Bavani, Nazli, MD Akbari, MD Manoj Wayne, Placido Mckeon, MD Mendoza, LESLI Crystal       To Whom It May Concern:    Mitchell Quintanilla was seen and treated in our department on 5/7/2024 - 5/9/2024. He should not return to work until 5/14/2024 .    If you have any questions or concerns, please do not hesitate to call.        __Placido Aranda MD____________  Physician/APC Signature

## (undated) NOTE — MR AVS SNAPSHOT
[address] [phone]               Thank you for choosing us for your health care visit with Veronica Rosales PA-C.   We are glad to serve you and happy to provide you with this tucker ? If your prescription is due for a refill, you may be due for a follow up appointment. ? To best provide you care, patients receiving routine medications need to be seen at least once a year.      protocol for controlled substances:  Written prescr Today's Vital Signs     BP Pulse Height Weight BMI    134/76 mmHg 65 70\" 300 lb 43.05 kg/m2         Current Medications          This list is accurate as of: 4/18/17 10:03 AM.  Always use your most recent med list.                Omega-3-acid Ethyl Esters drinks, candies and desserts   Eat plenty of low-fat dairy products High fat meats and dairy   Choose whole grain products Foods high in sodium   Water is best for hydration Fast food.    Eat at home when possible     Tips for increasing your physical activ

## (undated) NOTE — MR AVS SNAPSHOT
[address], [address] [place] [phone]               Thank you for choosing us for your health care visit with Samuel Rivera MD.  We are glad to serve you and happy to provide you with this family member will be picking up prescription, office must be given name of individual in advance and they must present an ID as well. ? The name of the person picking up your prescription must be documented in your chart.   Scheduling Tests    If your phy Current Medications          This list is accurate as of: 1/11/17 11:59 PM.  Always use your most recent med list.                Omega-3-acid Ethyl Esters 1 G Caps   Take 2 capsules (2 g total) by mouth 2 (two) times daily.    Commonly known as:  Kisha Russo

## (undated) NOTE — Clinical Note
I had the pleasure of seeing Mr. Raz Weiner in my office today. Please see my attached note.     Saima Townsend

## (undated) NOTE — Clinical Note
I had the pleasure of seeing Mr. Sushila Pollard in my office today. Please see my attached note.     Cristian Holcomb

## (undated) NOTE — Clinical Note
2017    Dear Dr. Abdi Genera    Your patient is being scheduled for an injection at [facility].    Patient: Saeed Dewey  : 10/16/1960  Procedure:  Radiofrequency ablation x 2   Physician: Dr. Essie Renee patient is c

## (undated) NOTE — LETTER
12/22/17        [place] [address]      Dear Tru Stock,    [facility] records indicate that you have outstanding lab work and or testing that was ordered for you and has not yet been completed:          Lipid Panel (fasting)      C